# Patient Record
Sex: FEMALE | Race: WHITE | NOT HISPANIC OR LATINO | Employment: FULL TIME | URBAN - METROPOLITAN AREA
[De-identification: names, ages, dates, MRNs, and addresses within clinical notes are randomized per-mention and may not be internally consistent; named-entity substitution may affect disease eponyms.]

---

## 2019-08-21 ENCOUNTER — TELEPHONE (OUTPATIENT)
Dept: ORTHOPEDICS | Facility: CLINIC | Age: 64
End: 2019-08-21

## 2019-08-21 NOTE — TELEPHONE ENCOUNTER
Pt called direct number, pt is wanting update. Informed her we received her images yesterday PM, will send send to uploaded today. will review records that we have received and call pt to update later today or Tomorrow. Pt pleased and verbalized understanding.

## 2019-08-22 ENCOUNTER — TELEPHONE (OUTPATIENT)
Dept: ORTHOPEDICS | Facility: CLINIC | Age: 64
End: 2019-08-22

## 2019-08-22 NOTE — TELEPHONE ENCOUNTER
Spoke to pt, informed her that we reviewed her records, we need PCP records, labs, meds, h&P. AN Requesting today. Pt pleased and verbalized understanding.

## 2019-08-27 ENCOUNTER — TELEPHONE (OUTPATIENT)
Dept: ORTHOPEDICS | Facility: CLINIC | Age: 64
End: 2019-08-27

## 2019-08-27 NOTE — TELEPHONE ENCOUNTER
Phoned Ms. Eason to inform her of records needed to proceed and the time line of the process.      - She informed that she is not taking any medications regularly, only Aleve as needed for knee pain,     - She stated that she recently had labs drawn, writer will request from Dr. Cortes. (phone [phone])     - Ms. Eason stated that her PCP will follow her post op.     - Writer asked about the Hematology eval mentioned in the Rheumatology notes, she stated that Dr. Cortes thought she may have Lupus but when the blood work returned Lupus was ruled out.    Writer informed of items not covered in the [employer]:     - DME - patient stated that she has a walker and will look for a BSC    - Post op medications - she stated that she does not have a co pay with [employer] insurance - writer encouraged her to call her insurance plan as the [facility] pharmacy may be out of network and a co pay may be required.    Patient verbalized understanding.

## 2019-08-29 ENCOUNTER — TELEPHONE (OUTPATIENT)
Dept: ORTHOPEDICS | Facility: CLINIC | Age: 64
End: 2019-08-29

## 2019-08-29 NOTE — TELEPHONE ENCOUNTER
L/M via voicemail to inform patient that labs were received an a PT is still needed.  Patient stated in previous conversation that she takes no medication, however, we will need physician documentation.  A fax has been forwarded to Dr. Cortes, PCP.

## 2019-08-29 NOTE — TELEPHONE ENCOUNTER
Phoned Dr. Cortes' office to confirm fax receipt.  The  stated that she had not received the fax.  Writer stated it was just faxed and confirmation received, will check back another time.

## 2019-08-30 ENCOUNTER — TELEPHONE (OUTPATIENT)
Dept: ORTHOPEDICS | Facility: CLINIC | Age: 64
End: 2019-08-30

## 2019-08-30 NOTE — TELEPHONE ENCOUNTER
Pt called direct line, informed pt she will need a PT lab drawn. Order has been faxed to her PCP. Pt states she will call today and get the lab done this afternoon. Pt pleased and verbalized understanding.

## 2019-09-04 ENCOUNTER — TELEPHONE (OUTPATIENT)
Dept: ORTHOPEDICS | Facility: CLINIC | Age: 64
End: 2019-09-04

## 2019-09-04 NOTE — TELEPHONE ENCOUNTER
"Phoned patient to inquire about physician concerns, she reported:    DVT 10 years ago: was at work doing inventory all day, at time to leave, her son came to pick her up from work and she could not get in the truck, her son had to pick her up and put her in the truck, she then went straight to the ER.  No cause was ever disclosed to her by a physician.    UA: She was on medication for bronchitis and it was thought that the antibiotics caused the UTI.  She was given "a pill" to clear it up.    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    Informed patient that these responses will be submitted to the physician and a call will be placed to her with the outcome. Patient verbalized understanding.    "

## 2019-09-05 ENCOUNTER — TELEPHONE (OUTPATIENT)
Dept: ORTHOPEDICS | Facility: CLINIC | Age: 64
End: 2019-09-05

## 2019-09-05 NOTE — TELEPHONE ENCOUNTER
Patient phoned to check the status of her case.  Writer informed that she had been approved, however, a surgery date could not be confirmed until Dr. Jones has been consulted (he is in surgery today).  The possible dates given were 10/15 or 10/22.  Writer informed that once the date is confirmed, she will be notified.

## 2019-09-06 ENCOUNTER — TELEPHONE (OUTPATIENT)
Dept: ORTHOPEDICS | Facility: CLINIC | Age: 64
End: 2019-09-06

## 2019-09-06 NOTE — TELEPHONE ENCOUNTER
Spoke to patient, informed her of her 10/22/19 surgery date, explained POC submittal to [facility], set up Telemed visit for 10/8/2019 @ 1030.  Provided 9 day schedule.  Patient verbalized understanding.

## 2019-09-09 ENCOUNTER — TELEPHONE (OUTPATIENT)
Dept: ORTHOPEDICS | Facility: CLINIC | Age: 64
End: 2019-09-09

## 2019-09-09 NOTE — TELEPHONE ENCOUNTER
"----- Message from Emily Miller RN sent at 9/6/2019  4:23 PM CDT -----  Regarding: RE: [employer] [facility] RIGHT KNEE  Appts scheduled.    10/8 at 0800 reserved for phone call with Dr. Bustillo, please confirm.     Emily     ----- Message -----  From: Mouna Hylton RN  Sent: 9/6/2019   4:09 PM  To: Fátima Bustillo MD, #  Subject: FW: [employer] [facility] RIGHT KNEE                       Hi Eleisha    Ms. Eason has been scheduled for surgery on 10-22-19.  Her pre-op appointment is scheduled on 10-21-19.  Please schedule a call with Dr. Bustillo for this patient.  Thanks.    Mouna  ----- Message -----  From: Fátima Bustillo MD  Sent: 9/4/2019   6:51 PM  To: Srikanth Jones MD, Emily Miller RN, #  Subject: RE: [employer] [facility] RIGHT KNEE                       Tx   Good to proceed   ----- Message -----  From: Mouna Hylton RN  Sent: 9/4/2019  11:34 AM  To: Srikanth Jones MD, #  Subject: RE: [employer] [facility] RIGHT KNEE                       Good morning Dr. 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  No cause was ever disclosed to her by a physician.    UA: She was on an antibiotic medication for bronchitis and it was thought that the antibiotics caused the UTI.  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    Please let me know if there is anything else that you need.  Thanks.    Mouna  ----- Message -----  From: Fátima Bustillo MD  Sent: 9/3/2019  11:18 PM  To: Srikanth Jones MD, Eleisha S Brown, RN, #  Subject: RE: [employer] [facility] RIGHT [facility]                       Office visit from March 2019 - indicates DVT 10 years ago , treated with Anticoagulation  -was there a reason for it - " post surgery, long travel , prolonged hospital stay etc    UA - 5/2/2019 - UTI-was this addressed ?    Note from Jan 2019 - indicates - left eye upward deviation - is this resolved ? What was felt to be the cause ? Is this long standing ?  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     I spoke with the patient re:     - She informed that she is not taking any medications regularly, only Aleve as needed for knee pain,      - When asked about the Hematology eval mentioned in the Rheumatology notes, she stated that Dr. Cortes thought she may have Lupus but when the blood work returned Lupus was ruled out.    Thanks,     Mouna

## 2019-09-09 NOTE — TELEPHONE ENCOUNTER
Patient returned writer's call.  She confirmed the date and time of 10-8-19 @ 0800 to speak with Dr. Bustillo.     Writer informed her that she will need to provide a therapy facility for post op PT and that she will need to go through her insurance company for outpatient PT, she asked about where to send the disability papers, fax & phone number given, she had questions about a second knee surgery, HDP's number given as well.  Patient verbalized understanding.

## 2019-09-20 ENCOUNTER — TELEPHONE (OUTPATIENT)
Dept: ORTHOPEDICS | Facility: CLINIC | Age: 64
End: 2019-09-20

## 2019-09-20 DIAGNOSIS — M17.11 PRIMARY OSTEOARTHRITIS OF RIGHT KNEE: Primary | ICD-10-CM

## 2019-09-20 NOTE — TELEPHONE ENCOUNTER
Left detailed message regarding f/u care after surgery. Provided name and direct line. Awaiting call back.

## 2019-09-26 ENCOUNTER — TELEPHONE (OUTPATIENT)
Dept: ORTHOPEDICS | Facility: CLINIC | Age: 64
End: 2019-09-26

## 2019-09-26 NOTE — TELEPHONE ENCOUNTER
Dr. Manny Cortes left  stating he will provide the post-operative care for this pt. Provided number for questions.

## 2019-10-08 ENCOUNTER — TELEPHONE (OUTPATIENT)
Dept: INTERNAL MEDICINE | Facility: CLINIC | Age: 64
End: 2019-10-08

## 2019-10-08 ENCOUNTER — TELEPHONE (OUTPATIENT)
Dept: ORTHOPEDICS | Facility: CLINIC | Age: 64
End: 2019-10-08

## 2019-10-08 DIAGNOSIS — R60.9 EDEMA, UNSPECIFIED TYPE: ICD-10-CM

## 2019-10-08 DIAGNOSIS — D64.9 ANEMIA, UNSPECIFIED TYPE: ICD-10-CM

## 2019-10-08 DIAGNOSIS — H81.09 MENIERE'S DISEASE, UNSPECIFIED LATERALITY: ICD-10-CM

## 2019-10-08 DIAGNOSIS — Z86.718 HISTORY OF THROMBOSIS: ICD-10-CM

## 2019-10-08 DIAGNOSIS — H54.7 BLINDNESS: ICD-10-CM

## 2019-10-08 RX ORDER — TRIAMTERENE AND HYDROCHLOROTHIAZIDE 37.5; 25 MG/1; MG/1
1 CAPSULE ORAL DAILY
Refills: 3 | COMMUNITY
Start: 2019-08-30

## 2019-10-08 RX ORDER — CHOLECALCIFEROL (VITAMIN D3) 125 MCG
440 CAPSULE ORAL
Status: ON HOLD | COMMUNITY
End: 2019-10-22 | Stop reason: HOSPADM

## 2019-10-08 NOTE — TELEPHONE ENCOUNTER
Pt called regarding U/S. Informed her that she does not need another U/S, Dr. Bustillo is requesting the results, pt states she will have her PCP fax the U/S results with the blood work. Understanding verbalized.

## 2019-10-08 NOTE — TELEPHONE ENCOUNTER
Spoke to patient regarding findings from physician review.  Informed her that the orders for blood work would be forwarded to Dr. Cortes (PCP), as well the request for the U/S of her leg back in March 2019.  She stated her cardiology records should be obtained from [facility] - this was an ER visit for chest pain.

## 2019-10-08 NOTE — TELEPHONE ENCOUNTER
Pre op  Phone call     10/22/2019 - Rt knee replacement     Chief complaint-Preoperative evaluation , Perioperative Medical management, complication reduction plan     Date of Evaluation- 10/08/2019      History of present illness- I had the pleasure of evaluating  this pleasant 64 y.o. lady in the pre op clinic prior to her elective Orthopedic surgery. The patient is new to me .    I have obtained the history by speaking to the patient and by reviewing the electronic health records.    Events leading up to surgery / History of presenting illness -    She has been troubled with moderate-severe  Rt knee  pain for the past 8-9 months.. Has less intense pain for 5-6 years prior   . Pain increases with standing for a long time  and activity and decreases with resting.      Relevant health conditions of significance for the perioperative period/ History of presenting illness -    Meniere's disease     Not known to have heart disease , Diabetes Mellitus, HTN, Lung disease -    2008/2009 - Had gall bladder removal - symptoms leading to that - Chest tightness   Had Stress test, EKG  No chest discomfort since     Active cardiac conditions- none    Revised cardiac risk index predictors- None     Works at [employer]   On her feet 8 hours a day   Was a    Currently not working     Lives alone - most of the times     Functional capacity -Examples of physical activity, Grocery,    house work and  can take a flight of stairs holding on to the railing----- She can undertake all the above activities without  chest pain,chest tightness, Shortness of breath ,dizziness,lightheadedness making her exercise tolerance more, than 4 Mets.       Review of symptoms    @ROS@    Constitutional - No significant weight changes ,No fever  Eyes- No new vision changes  ENT- STOP BANG -Used to snore in the past , age over 48  There is no height or weight on file to calculate BMI.  Cardiac-As above   Respiratory- No cough, expectoration and  no  hemoptysis  GI- Bowel movements  regular- every other day   No overt GI/ blood losses   -No dysuria and  no urinary hesitancy   MS-As above/  Has contralateral knee pain    Neurologic-No unilateral weakness   Psychiatric- no suicidal, homicidal ideation   Endocrine-  Prednisone use for over 3 weeks -no  Hematological/Lymphatic-No spontaneous bruising, bleeding    Past Medical history-     Pertinent negatives-     Heart disease -  Vascular stenting -    Past Surgical history -       No Anaesthetic,Bleeding ,Cardiac problems with previous surgeries  No history of delirium   No history of post operative  nausea, vomiting     Family history-    FH- No anesthesia,bleeding in family   Father had blood clot after CABG   Heart disease- 70's    Social history-     Lives alone  Help available post op - [place]    Medications and Allergies - reviewed in EPIC      Investigations    Review of Medicine tests    May 2019 - Anemia  Likely Aleve - related - lately less usage     8/30/2019 - INR -0.9    Will request office to provide Medication instructions     Dr NIRMAL Bustillo MD MRCP ( ),FACP   [facility]  [facility]   Pager [phone], Cell [phone]

## 2019-10-08 NOTE — TELEPHONE ENCOUNTER
Provided joint education. Pt provided OP PT location, answered questions and concerns. Understanding verbalized.

## 2019-10-08 NOTE — ASSESSMENT & PLAN NOTE
2009   Had Lap Cholecystectomy   A few months after the Cholecystectomy   Rt LE   Hospitalized over night   History suggests possible ? Lovenox   Was not on Anticoagulation to go home   Reports being ambulatory after Cholecystectomy   History of ?DVT  No PE  Episode- 1  Associated with no reduced mobility, no long journeys, no cancer, prior surgery, no Hospital stay, no HRT  Was working a lot at that time   IVC filter - None

## 2019-10-10 DIAGNOSIS — M17.11 PRIMARY OSTEOARTHRITIS OF RIGHT KNEE: ICD-10-CM

## 2019-10-10 DIAGNOSIS — Z96.651 STATUS POST TOTAL RIGHT KNEE REPLACEMENT: Primary | ICD-10-CM

## 2019-10-15 ENCOUNTER — TELEPHONE (OUTPATIENT)
Dept: ORTHOPEDICS | Facility: CLINIC | Age: 64
End: 2019-10-15

## 2019-10-15 NOTE — TELEPHONE ENCOUNTER
Returned pt's call, confirmed her 0800 am appt start time for Monday 10/21. Informed her that Nydia or Carolee will call to discuss where to meet and she will be escorted to her appointments. Pt pleased and verbalized understanding.

## 2019-10-21 ENCOUNTER — HOSPITAL ENCOUNTER (OUTPATIENT)
Dept: RADIOLOGY | Facility: HOSPITAL | Age: 64
Discharge: HOME | End: 2019-10-21
Attending: ORTHOPAEDIC SURGERY
Payer: COMMERCIAL

## 2019-10-21 ENCOUNTER — INITIAL CONSULT (OUTPATIENT)
Dept: INTERNAL MEDICINE | Facility: CLINIC | Age: 64
End: 2019-10-21
Payer: MEDICARE

## 2019-10-21 ENCOUNTER — OFFICE VISIT (OUTPATIENT)
Dept: ORTHOPEDICS | Facility: CLINIC | Age: 64
End: 2019-10-21
Payer: MEDICARE

## 2019-10-21 ENCOUNTER — ANESTHESIA EVENT (OUTPATIENT)
Dept: SURGERY | Facility: HOSPITAL | Age: 64
DRG: 470 | End: 2019-10-21
Payer: COMMERCIAL

## 2019-10-21 ENCOUNTER — HOSPITAL ENCOUNTER (OUTPATIENT)
Dept: PREADMISSION TESTING | Facility: HOSPITAL | Age: 64
Discharge: HOME | End: 2019-10-21
Attending: ANESTHESIOLOGY
Payer: COMMERCIAL

## 2019-10-21 ENCOUNTER — OFFICE VISIT (OUTPATIENT)
Dept: ORTHOPEDICS | Facility: CLINIC | Age: 64
End: 2019-10-21
Payer: COMMERCIAL

## 2019-10-21 VITALS
WEIGHT: 189.63 LBS | SYSTOLIC BLOOD PRESSURE: 115 MMHG | BODY MASS INDEX: 30.47 KG/M2 | HEART RATE: 80 BPM | TEMPERATURE: 97 F | HEIGHT: 66 IN | DIASTOLIC BLOOD PRESSURE: 77 MMHG

## 2019-10-21 VITALS — WEIGHT: 189.63 LBS | BODY MASS INDEX: 30.47 KG/M2 | HEIGHT: 66 IN

## 2019-10-21 VITALS
SYSTOLIC BLOOD PRESSURE: 104 MMHG | TEMPERATURE: 97 F | BODY MASS INDEX: 30.47 KG/M2 | HEART RATE: 82 BPM | HEIGHT: 66 IN | WEIGHT: 189.63 LBS | OXYGEN SATURATION: 96 % | DIASTOLIC BLOOD PRESSURE: 66 MMHG

## 2019-10-21 DIAGNOSIS — M17.11 PRIMARY OSTEOARTHRITIS OF RIGHT KNEE: Primary | ICD-10-CM

## 2019-10-21 DIAGNOSIS — Z86.718 HISTORY OF THROMBOSIS: ICD-10-CM

## 2019-10-21 DIAGNOSIS — D75.89 MACROCYTOSIS: ICD-10-CM

## 2019-10-21 DIAGNOSIS — Z96.651 STATUS POST TOTAL RIGHT KNEE REPLACEMENT: ICD-10-CM

## 2019-10-21 DIAGNOSIS — H81.02 MENIERE'S DISEASE OF LEFT EAR: ICD-10-CM

## 2019-10-21 DIAGNOSIS — Z87.891 HISTORY OF TOBACCO USE: ICD-10-CM

## 2019-10-21 DIAGNOSIS — D64.9 ANEMIA, UNSPECIFIED TYPE: ICD-10-CM

## 2019-10-21 DIAGNOSIS — M17.11 PRIMARY OSTEOARTHRITIS OF RIGHT KNEE: ICD-10-CM

## 2019-10-21 DIAGNOSIS — R60.9 EDEMA, UNSPECIFIED TYPE: ICD-10-CM

## 2019-10-21 DIAGNOSIS — K21.9 GASTROESOPHAGEAL REFLUX DISEASE, ESOPHAGITIS PRESENCE NOT SPECIFIED: ICD-10-CM

## 2019-10-21 DIAGNOSIS — Z01.818 PREOP EXAMINATION: Primary | ICD-10-CM

## 2019-10-21 PROBLEM — H54.40 BLINDNESS OF LEFT EYE: Status: ACTIVE | Noted: 2019-10-08

## 2019-10-21 PROCEDURE — 99999 PR PBB SHADOW E&M-EST. PATIENT-LVL II: ICD-10-PCS | Mod: PBBFAC,COE,, | Performed by: ORTHOPAEDIC SURGERY

## 2019-10-21 PROCEDURE — 99999 PR PBB SHADOW E&M-EST. PATIENT-LVL I: ICD-10-PCS | Mod: PBBFAC,COE,, | Performed by: HOSPITALIST

## 2019-10-21 PROCEDURE — 99499 NO LOS: ICD-10-PCS | Mod: S$GLB,COE,, | Performed by: PHYSICIAN ASSISTANT

## 2019-10-21 PROCEDURE — 99499 UNLISTED E&M SERVICE: CPT | Mod: S$GLB,COE,, | Performed by: ORTHOPAEDIC SURGERY

## 2019-10-21 PROCEDURE — 99211 OFF/OP EST MAY X REQ PHY/QHP: CPT | Mod: PBBFAC,25 | Performed by: HOSPITALIST

## 2019-10-21 PROCEDURE — 99999 PR PBB SHADOW E&M-EST. PATIENT-LVL IV: CPT | Mod: PBBFAC,COE,, | Performed by: PHYSICIAN ASSISTANT

## 2019-10-21 PROCEDURE — 99499 NO LOS: ICD-10-PCS | Mod: S$GLB,COE,, | Performed by: ORTHOPAEDIC SURGERY

## 2019-10-21 PROCEDURE — 99214 OFFICE O/P EST MOD 30 MIN: CPT | Mod: PBBFAC,25,27 | Performed by: PHYSICIAN ASSISTANT

## 2019-10-21 PROCEDURE — 99999 PR PBB SHADOW E&M-EST. PATIENT-LVL I: CPT | Mod: PBBFAC,COE,, | Performed by: HOSPITALIST

## 2019-10-21 PROCEDURE — 99999 PR PBB SHADOW E&M-EST. PATIENT-LVL IV: ICD-10-PCS | Mod: PBBFAC,COE,, | Performed by: PHYSICIAN ASSISTANT

## 2019-10-21 PROCEDURE — 99204 PR OFFICE/OUTPT VISIT, NEW, LEVL IV, 45-59 MIN: ICD-10-PCS | Mod: S$PBB,COE,, | Performed by: HOSPITALIST

## 2019-10-21 PROCEDURE — 73560 X-RAY EXAM OF KNEE 1 OR 2: CPT | Mod: 26,RT,COE, | Performed by: RADIOLOGY

## 2019-10-21 PROCEDURE — 73560 XR KNEE 1 OR 2 VIEW RIGHT: ICD-10-PCS | Mod: 26,RT,COE, | Performed by: RADIOLOGY

## 2019-10-21 PROCEDURE — 99499 UNLISTED E&M SERVICE: CPT | Mod: S$GLB,COE,, | Performed by: PHYSICIAN ASSISTANT

## 2019-10-21 PROCEDURE — 99999 PR PBB SHADOW E&M-EST. PATIENT-LVL II: CPT | Mod: PBBFAC,COE,, | Performed by: ORTHOPAEDIC SURGERY

## 2019-10-21 PROCEDURE — 99212 OFFICE O/P EST SF 10 MIN: CPT | Mod: PBBFAC,25,27 | Performed by: ORTHOPAEDIC SURGERY

## 2019-10-21 PROCEDURE — 99204 OFFICE O/P NEW MOD 45 MIN: CPT | Mod: S$PBB,COE,, | Performed by: HOSPITALIST

## 2019-10-21 PROCEDURE — 73560 X-RAY EXAM OF KNEE 1 OR 2: CPT | Mod: TC,RT

## 2019-10-21 RX ORDER — OXYCODONE HYDROCHLORIDE 5 MG/1
10 TABLET ORAL
Status: CANCELLED | OUTPATIENT
Start: 2019-10-21

## 2019-10-21 RX ORDER — BISACODYL 10 MG
10 SUPPOSITORY, RECTAL RECTAL EVERY 12 HOURS PRN
Status: CANCELLED | OUTPATIENT
Start: 2019-10-21

## 2019-10-21 RX ORDER — ROPIVACAINE HYDROCHLORIDE 2 MG/ML
8 INJECTION, SOLUTION EPIDURAL; INFILTRATION; PERINEURAL CONTINUOUS
Status: CANCELLED | OUTPATIENT
Start: 2019-10-21

## 2019-10-21 RX ORDER — MORPHINE SULFATE 10 MG/ML
2 INJECTION, SOLUTION INTRAMUSCULAR; INTRAVENOUS
Status: CANCELLED | OUTPATIENT
Start: 2019-10-21

## 2019-10-21 RX ORDER — NALOXONE HCL 0.4 MG/ML
0.02 VIAL (ML) INJECTION
Status: CANCELLED | OUTPATIENT
Start: 2019-10-21

## 2019-10-21 RX ORDER — AMOXICILLIN 250 MG
1 CAPSULE ORAL 2 TIMES DAILY
Status: CANCELLED | OUTPATIENT
Start: 2019-10-21

## 2019-10-21 RX ORDER — DOCUSATE SODIUM 100 MG/1
100 CAPSULE, LIQUID FILLED ORAL 2 TIMES DAILY PRN
Qty: 60 CAPSULE | Refills: 0 | Status: SHIPPED | OUTPATIENT
Start: 2019-10-21 | End: 2020-01-13 | Stop reason: ALTCHOICE

## 2019-10-21 RX ORDER — ACETAMINOPHEN 500 MG
1000 TABLET ORAL EVERY 6 HOURS
Status: CANCELLED | OUTPATIENT
Start: 2019-10-22 | End: 2019-10-23

## 2019-10-21 RX ORDER — OXYCODONE HYDROCHLORIDE 5 MG/1
TABLET ORAL
Qty: 50 TABLET | Refills: 0 | Status: SHIPPED | OUTPATIENT
Start: 2019-10-21 | End: 2019-10-28 | Stop reason: SDUPTHER

## 2019-10-21 RX ORDER — FENTANYL CITRATE 50 UG/ML
25 INJECTION, SOLUTION INTRAMUSCULAR; INTRAVENOUS EVERY 5 MIN PRN
Status: CANCELLED | OUTPATIENT
Start: 2019-10-21

## 2019-10-21 RX ORDER — CELECOXIB 100 MG/1
200 CAPSULE ORAL DAILY
Status: CANCELLED | OUTPATIENT
Start: 2019-10-22

## 2019-10-21 RX ORDER — OXYCODONE HYDROCHLORIDE 5 MG/1
15 TABLET ORAL
Status: CANCELLED | OUTPATIENT
Start: 2019-10-21

## 2019-10-21 RX ORDER — MUPIROCIN 20 MG/G
1 OINTMENT TOPICAL
Status: CANCELLED | OUTPATIENT
Start: 2019-10-21

## 2019-10-21 RX ORDER — FAMOTIDINE 20 MG/1
20 TABLET, FILM COATED ORAL 2 TIMES DAILY
Status: CANCELLED | OUTPATIENT
Start: 2019-10-21

## 2019-10-21 RX ORDER — ASPIRIN 81 MG/1
81 TABLET ORAL 2 TIMES DAILY
Status: CANCELLED | OUTPATIENT
Start: 2019-10-21

## 2019-10-21 RX ORDER — RAMELTEON 8 MG/1
8 TABLET ORAL NIGHTLY PRN
Status: CANCELLED | OUTPATIENT
Start: 2019-10-21

## 2019-10-21 RX ORDER — DEXTROMETHORPHAN HYDROBROMIDE, GUAIFENESIN 5; 100 MG/5ML; MG/5ML
650 LIQUID ORAL EVERY 8 HOURS PRN
Qty: 120 TABLET | Refills: 0 | Status: ON HOLD | OUTPATIENT
Start: 2019-10-21 | End: 2020-07-07

## 2019-10-21 RX ORDER — SODIUM CHLORIDE 9 MG/ML
INJECTION, SOLUTION INTRAVENOUS
Status: CANCELLED | OUTPATIENT
Start: 2019-10-21

## 2019-10-21 RX ORDER — PREGABALIN 25 MG/1
150 CAPSULE ORAL
Status: CANCELLED | OUTPATIENT
Start: 2019-10-21

## 2019-10-21 RX ORDER — ACETAMINOPHEN 10 MG/ML
1000 INJECTION, SOLUTION INTRAVENOUS ONCE
Status: CANCELLED | OUTPATIENT
Start: 2019-10-21 | End: 2019-10-21

## 2019-10-21 RX ORDER — POLYETHYLENE GLYCOL 3350 17 G/17G
17 POWDER, FOR SOLUTION ORAL DAILY
Status: CANCELLED | OUTPATIENT
Start: 2019-10-22

## 2019-10-21 RX ORDER — SODIUM CHLORIDE 0.9 % (FLUSH) 0.9 %
10 SYRINGE (ML) INJECTION
Status: CANCELLED | OUTPATIENT
Start: 2019-10-21

## 2019-10-21 RX ORDER — OXYCODONE HYDROCHLORIDE 5 MG/1
5 TABLET ORAL
Status: CANCELLED | OUTPATIENT
Start: 2019-10-21

## 2019-10-21 RX ORDER — SODIUM CHLORIDE 9 MG/ML
INJECTION, SOLUTION INTRAVENOUS CONTINUOUS
Status: CANCELLED | OUTPATIENT
Start: 2019-10-21 | End: 2019-10-22

## 2019-10-21 RX ORDER — MUPIROCIN 20 MG/G
1 OINTMENT TOPICAL 2 TIMES DAILY
Status: CANCELLED | OUTPATIENT
Start: 2019-10-21 | End: 2019-10-26

## 2019-10-21 RX ORDER — PREGABALIN 25 MG/1
150 CAPSULE ORAL NIGHTLY
Status: CANCELLED | OUTPATIENT
Start: 2019-10-21

## 2019-10-21 RX ORDER — ONDANSETRON 2 MG/ML
4 INJECTION INTRAMUSCULAR; INTRAVENOUS EVERY 8 HOURS PRN
Status: CANCELLED | OUTPATIENT
Start: 2019-10-21

## 2019-10-21 RX ORDER — MIDAZOLAM HYDROCHLORIDE 1 MG/ML
1 INJECTION INTRAMUSCULAR; INTRAVENOUS EVERY 5 MIN PRN
Status: CANCELLED | OUTPATIENT
Start: 2019-10-21

## 2019-10-21 RX ORDER — LIDOCAINE HYDROCHLORIDE 10 MG/ML
1 INJECTION, SOLUTION EPIDURAL; INFILTRATION; INTRACAUDAL; PERINEURAL
Status: CANCELLED | OUTPATIENT
Start: 2019-10-21

## 2019-10-21 RX ORDER — CELECOXIB 100 MG/1
400 CAPSULE ORAL
Status: CANCELLED | OUTPATIENT
Start: 2019-10-21

## 2019-10-21 NOTE — ASSESSMENT & PLAN NOTE
Quit in 2007   Smoked for 15 years , 1-2 packs per day     Not known to have COPD,  Emphysema, wheezing , chronic phlegm   No inhaler, oxygen use   subjectively , no SOB

## 2019-10-21 NOTE — PROGRESS NOTES
[facility] - Pre Op Consult  Progress Note    Patient Name: Mirna Eason  MRN: [id number]  Date of Evaluation- 10/21/2019  PCP- Primary Doctor No    Future cases for Mirna Eason [[id number]]     Case ID Status Date Time Myron Procedure Provider Location    [id number] [facility] 10/22/2019  9:30  ARTHROPLASTY, KNEE, TOTAL-GELACIO Jones MD [[id number]] [facility] OR      Rt     HPI:  History of present illness- I had the pleasure of meeting this pleasant 64 y.o. lady in the pre op clinic prior to her elective Orthopedic surgery. The patient is new to me . Mirna was accompanied by sister Rashmi .    I have obtained the history by speaking to the patient and by reviewing the electronic health records.    Events leading up to surgery / History of presenting illness -    She has been troubled with moderate-severe  Right knee  pain for 1 year  . Pain increases with activity and decreases with resting.    Relevant health conditions of significance for the perioperative period/ History of presenting illness -    Subjectively describes health as good      Not known to have heart disease , Diabetes Mellitus, HTN, Lung disease -    In 2009   Had Rt lateral Thigh area discomfort - had it one day   Leading to ER visit   As per her ER MD felt that she had thrombosis , based on US   Hospitalized over night   History suggests possible ? Lovenox treatment   Was discharged home the next day  Was not on discharged on  Anticoagulation   She believes that she does not have thrombosis   She believes that it is muscle related as she was up and down the ladder that day   Still has some discomfort on walking   It is unclear that it she had thrombosis  The hospital where she was cared for is no longer open   As per her she had US March 1 st 2019 of the lower extremities and to her understanding no Thrombosis    History of ?DVT  No PE  Episode- 1  Associated with no reduced mobility, no long journeys, no cancer,no  prior surgery, no Hospital stay,  no HRT,no tobacco use   Was working a lot at that time   IVC filter - None        2011 - Had gall bladder removal - symptoms leading to that - Chest tightness   Went to ER  Had Stress test, EKG - To her understanding - no problem with heart   Further testing showed reduced gall bladder function leading to gall bladder rwemoval  No chest discomfort since   Chest discomfort was attributed to gall bladder problem      Works at [employer]   On her feet 8 hours a day   Was a    Currently not working      Lives alone - most of the times  Sister going to help post op           Subjective/ Objective:       Chief complaint-Preoperative evaluation, Perioperative Medical management, complication reduction plan     Active cardiac conditions- none    Revised cardiac risk index predictors- none    Functional capacity -Examples of physical activity, paces her self,   house work and can take a flight of stairs holding on to the railing, grocery , goes to [employer] ----- She can undertake all the above activities without  chest pain,chest tightness, Shortness of breath ,dizziness,lightheadedness making her exercise tolerance more,  than 4 Mets.       Review of Systems   Constitutional: Negative for chills and fever.        No unusual weight changes     HENT:        GENOG score  1/ 8        Age over 50        Eyes:        No new visual changes   Respiratory:        No cough , phlegm  No Hemoptysis   Cardiovascular:        As noted   Gastrointestinal:        No overt GI/ blood losses  Bowel movements- - Every other day - usual pattern  Suggested follow up    Endocrine:        Prednisone use > 20 mg daily for 3 weeks- none    Genitourinary: Negative for dysuria.        No urinary hesitancy    Musculoskeletal:        Bilateral knee pains   Skin: Negative for rash.   Neurological: Negative for syncope.        No unilateral weakness   Hematological:        Current use of Anticoagulants  None    Psychiatric/Behavioral:        No  Depression,Anxiety     No vascular stenting     No past medical history pertinent negatives.  No family history on file.      No anesthesia, bleeding, cardiac problems, PONV with previous surgeries/procedures.  Medications and Allergies reviewed in epic.   FH- No anesthesia,bleeding / venous thrombosis  in family     Physical Exam      Physical Exam  Constitutional- General appearance-Conscious,Coherent  Eyes- No conjunctival icterus,pupils Left eye Medial deviation ,  round  and reactive to light   ENT-Oral cavity- moist  , Hearing grossly normal   Neck- No thyromegaly ,Trachea -central, No jugular venous distension,   No Carotid Bruit   Cardiovascular -Heart Sounds- Normal  and  no murmur   , No gallop rhythm   Respiratory - Normal Respiratory Effort, Normal breath sounds,  no wheeze  and  no forced expiratory wheeze    Peripheral pitting pedal edema-- mild, no calf pain   Gastrointestinal -Soft abdomen, No palpable masses, Non Tender,Liver,Spleen not palpable. No-- free fluid and shifting dullness  Musculoskeletal- No finger Clubbing. Strength grossly normal   Lymphatic-No Palpable cervical, axillary,Inguinal lymphadenopathy   Psychiatric - normal effect,Orientation  Rt Dorsalis pedis pulses-palpable    Lt Dorsalis pedis pulses- palpable   Rt Posterior tibial pulses -palpable   Left posterior tibial pulses -palpable   Miscellaneous -  no asterixis,  no dupuytren's contracture,  Surgical scarlower abdomen ,left ankle , rash Under abdomen - uses Mono stat as needed - rash not bothering her- she deffed  any treratment for now ,  no renal bruit,  bowel sounds positive  and osteoarthritic nodes   Investigations  Lab and Imaging have been reviewed in epic.    Creatinine May 2019 - 0.83     Aug 2019 - INR 0.9    [facility]   Cassidy scan Thallium testing- 2010 - No ischemia, LVEf 61 %    EKG July 2010- Sinus  Stress test - April 2012- no ischemia   LVEF - 55%  EKG 2016- No acute changes       Review  of old records- Was done and information gathered regards to events leading to surgery and health conditions of significance in the perioperative period.        Preoperative cardiac risk assessment-  The patient does not have any active cardiac conditions . Revised cardiac risk index predictors-0 ---.Functional capacity is more than 4 Mets. She will be undergoing a Orthopedic procedure that carries a intermediate risk     Risk of a major Cardiac event ( Defined as death, myocardial infarction, or cardiac arrest at 30 days after noncardiac surgery), based on RCRI score     -3.9%       No further cardiac work up is indicated prior to proceeding with the surgery          American Society of Anesthesiologists Physical status classification ( ASA ) class: 3     Postoperative pulmonary complication risk assessment:      ARISCAT ( Canet) risk index- risk class -  Low, if duration of surgery is under 3 hours, intermediate, if duration of surgery is over 3 hours        Assessment/Plan:     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   Inward,deviation Left eye   No recent problem   Functional  Care suggested with Rt eye since it is the only functioning  eye      Meniere disease  Left side   Takes Triamterene - HCTZ - daily for Meniere's which is also helping with lower extremity edema   No recent problem   Has been holding this Medication- for the past few days - ( Mis understanding ?) - mild ear fullness- since holding  Suggested resumption, suggested holding AM of surgery         Anemia  May 2019 - Anemia  Likely Aleve - related -took for 5 years    lately less usage   Last colonoscopy 10 years ago   No ulcer history   May 2019 - Hb 11.3   HCT - 35   Likely cause of Anemia is NSAID use     No overt GI/ bleeding     She feels that's he can handle ASA     Edema  On Dyazide for edema  Not known to have HTN  /80's     Edema- I suggested avoidance of added salt,avoidance of NSAID's, unless advised or ordered  and  suggested Limb elevation and aundrea hose use    Acid reflux  GERD  Symptoms -acid taste to the throat   With certain foods  Takes Tums as needed - once in a few months    History of tobacco use  Quit in 2007   Smoked for 15 years , 1-2 packs per day     Not known to have COPD,  Emphysema, wheezing , chronic phlegm   No inhaler, oxygen use   subjectively , no SOB        Preventive perioperative care    Thromboembolic prophylaxis:  Her risk factors for thrombosis include surgical procedure and age.I suggest  thromboembolic prophylaxis ( mechanical/pharmacological, weighing the risk benefits of pharmacological agent use considering nhung procedural bleeding )  during the perioperative period.I suggested being active in the post operative period.   The patient is a candidate for extended DVT prophylaxis     Postoperative pulmonary complication prophylaxis-Risk factors for post operative pulmonary complications include age over 65 years and ASA class >2- I suggest incentive spirometry use, early ambulation and end tidal carbon dioxide monitoring  , oral care , head end of bed elevation      Renal complication prophylaxis-. I suggest keeping her well hydrated  in the perioperative period.  Suggested hydration      Surgical site Infection Prophylaxis-I  suggest appropriate antibiotic for Prophylaxis against Surgical site infections      In view of regional anesthesia  the patient  is at risk of postoperative urinary retention.  I suggest avoidance / minimizing the of  Benzodiazepines,Anticholinergic medication,antihistamines ( Benadryl) , if possible in the perioperative period. I suggest using the minimum possible use of opioids for the minimum period of time in the perioperative period. Benadryl avoidance suggested      This visit was focused on Preoperative evaluation, Perioperative Medical management, complication reduction plans. I suggest that the patient follows up with primary care or relevant sub specialists for  "ongoing health care.    I appreciate the opportunity to be involved in this patients care. Please feel free to contact me if there were any questions about this consultation.    Patient is optimized     Patient was instructed to call and update me about any changes to health,  medication, office visits ,testing out side of the [facility] operative care center , hospitalizations between now and surgery     Fátima Bustillo MD  Perioperative Medicine  [facility]   Pager [phone]  --  10/21- 14 16     /66 Comment: left  Pulse 82   Temp 97 °F (36.1 °C)   Ht 5' 6" (1.676 m)   Wt 86 kg (189 lb 9.6 oz)   SpO2 96%   BMI 30.60 kg/m²    --  10/21- 19 40     Called to follow up , spoke to her,  to address any concerns with the up coming surgery or any questions on Medication instructions -  Doing well ,No changes to Medication, Health -    Lab from today - Hb 11  HCT 34.3  PLT - N  BMP  N    RBC count low- not new     May of 2019     Hb 11.3   HCT 35    Hb, HCT - stable   -  10/21- 19 48     Suggested follow up regarding low RBC count  "

## 2019-10-21 NOTE — PROGRESS NOTES
Subjective:      Patient ID: Mirna Eason is a 64 y.o. female.    Chief Complaint: Pain of the Right Knee    HPI  Mirna Eason is a 64 year old female here with a greater than 10  year history of right knee pain. The patient is a   at [employer]. There was not a history of recent trauma.  The pain is severe The pain is located in the global aspect of the knee. There is is not radiation.  There is catching or locking.   The pain is described as achy. The patient has not had prior surgery. It is aggravated by sitting, standing and walking.  It is not alleviated by rest. There is not numbness or tingling of the lower extremity.  There is not back pain.  She  has tried medications ad injections. They have not helped.  She does have difficulty getting in or out of a car, getting dressed, or going up or down stairs.  The patient does not use an assistive device.    Past Medical History:   Diagnosis Date    Arthritis     GERD (gastroesophageal reflux disease)      Past Surgical History:   Procedure Laterality Date    appendectomy      fracture surgery- fell down thestairs- Ankle -       mechanical fall     LAPAROSCOPIC CHOLECYSTECTOMY      oophorectomy, Salpix- Rt -        Family History   Problem Relation Age of Onset    Heart disease Father 49        heart bypass     Social History     Socioeconomic History    Marital status:      Spouse name: Not on file    Number of children: Not on file    Years of education: Not on file    Highest education level: Not on file   Occupational History    Not on file   Social Needs    Financial resource strain: Not on file    Food insecurity:     Worry: Not on file     Inability: Not on file    Transportation needs:     Medical: Not on file     Non-medical: Not on file   Tobacco Use    Smoking status: Former Smoker     Years: 15.00     Last attempt to quit:      Years since quittin.8    Smokeless tobacco: Never Used   Substance and  Sexual Activity    Alcohol use: Not on file    Drug use: Never    Sexual activity: Not Currently   Lifestyle    Physical activity:     Days per week: Not on file     Minutes per session: Not on file    Stress: Not on file   Relationships    Social connections:     Talks on phone: Not on file     Gets together: Not on file     Attends Buddhism service: Not on file     Active member of club or organization: Not on file     Attends meetings of clubs or organizations: Not on file     Relationship status: Not on file   Other Topics Concern    Not on file   Social History Narrative    Not on file     Current Outpatient Medications on File Prior to Visit   Medication Sig Dispense Refill    calcium carbonate (TUMS ORAL) Take by mouth as needed (acid reflux).      naproxen sodium (ALEVE) 220 mg Cap Take 440 mg by mouth as needed (pain).      triamterene-hydrochlorothiazide 37.5-25 mg (DYAZIDE) 37.5-25 mg per capsule Take 1 capsule by mouth once daily.  3     No current facility-administered medications on file prior to visit.      Review of patient's allergies indicates:  No Known Allergies    Review of Systems   Constitution: Negative for chills, fever and night sweats.   HENT: Negative for hearing loss.    Eyes: Negative for blurred vision and double vision.   Cardiovascular: Negative for chest pain, claudication and leg swelling.   Respiratory: Negative for shortness of breath.    Endocrine: Negative for polydipsia, polyphagia and polyuria.   Hematologic/Lymphatic: Negative for adenopathy and bleeding problem. Does not bruise/bleed easily.   Skin: Negative for poor wound healing.   Musculoskeletal: Positive for joint pain.   Gastrointestinal: Negative for diarrhea and heartburn.   Genitourinary: Negative for bladder incontinence.   Neurological: Negative for focal weakness, headaches, numbness, paresthesias and sensory change.   Psychiatric/Behavioral: The patient is not nervous/anxious.   "  Allergic/Immunologic: Negative for persistent infections.         Objective:      Body mass index is 30.6 kg/m².  Vitals:    10/21/19 1036   Weight: 86 kg (189 lb 9.5 oz)   Height: 5' 6" (1.676 m)         General    Constitutional: She is oriented to person, place, and time. She appears well-developed and well-nourished.   HENT:   Head: Normocephalic and atraumatic.   Eyes: EOM are normal.   Cardiovascular: Normal rate.    Pulmonary/Chest: Effort normal.   Neurological: She is alert and oriented to person, place, and time.   Psychiatric: She has a normal mood and affect. Her behavior is normal.     General Musculoskeletal Exam   Gait: normal       Right Knee Exam     Inspection   Erythema: absent  Scars: absent  Swelling: present  Effusion: present  Deformity: present (mild varus)  Bruising: absent    Tenderness   The patient is tender to palpation of the medial joint line and lateral joint line.    Crepitus   The patient has crepitus of the patella.    Range of Motion   Extension: 0   Flexion: 110     Tests   Ligament Examination Lachman: normal (-1 to 2mm)   MCL - Valgus: normal (0 to 2mm)  LCL - Varus: normal  Patella   Passive Patellar Tilt: neutral  Patellar Grind: positive    Other   Sensation: normal    Left Knee Exam     Inspection   Erythema: absent  Scars: absent  Swelling: absent  Effusion: absent  Deformity: absent  Bruising: absent    Tenderness   The patient is experiencing no tenderness.     Range of Motion   Extension: 0   Flexion: 130     Tests   Stability Lachman: normal (-1 to 2mm)   MCL - Valgus: normal (0 to 2mm)  LCL - Varus: normal (0 to 2mm)  Patella   Passive Patellar Tilt: neutral    Other   Sensation: normal    Muscle Strength   Right Lower Extremity   Hip Abduction: 5/5   Quadriceps:  5/5   Hamstrin/5   Left Lower Extremity   Hip Abduction: 5/5   Quadriceps:  5/5   Hamstrin/5     Reflexes     Left Side  Quadriceps:  2+    Right Side   Quadriceps:  2+    Vascular Exam     Right " Pulses  Dorsalis Pedis:      2+          Left Pulses  Dorsalis Pedis:      2+          Edema  Right Lower Leg: absent  Left Lower Leg: absent      Radiographs taken today and reviewed by me demonstrate severe arthritic change of the right KNEE(s).There  is not bone destruction.  There is not a fracture.  The changes are tricompartmental.            Assessment:       Encounter Diagnosis   Name Primary?    Primary osteoarthritis of right knee Yes          Plan:       Mirna was seen today for pain.    Diagnoses and all orders for this visit:    Primary osteoarthritis of right knee      Treatment options were discussed. The surgical process of right knee replacement was discussed in detail with the patient including a detailed discussion of the procedure itself (including visual model, x-ray review, and literature review). The typical perioperative and post-operative course was discussed and perioperative risks were discussed to the patient's satisfaction.  Risks and complications discussed included but were not limited to the risks of anesthetic complications, infection, bleeding, wound healing complications, stiffness, aseptic loosening, instability, limb length inequality, neurologic dysfunction including numbness and weakness, additional surgery,  DVT, pulmonary embolism, perioperative medical risks (cardiac, pulmonary, renal, neurologic), and death and the patient elects to proceed.  The patient should get medically cleared and attend the joint seminar.      Probable Eliquis given history of possible DVT.

## 2019-10-21 NOTE — HPI
History of present illness- I had the pleasure of meeting this pleasant 64 y.o. lady in the pre op clinic prior to her elective Orthopedic surgery. The patient is new to me . Mirna was accompanied by sister Rashmi .    I have obtained the history by speaking to the patient and by reviewing the electronic health records.    Events leading up to surgery / History of presenting illness -    She has been troubled with moderate-severe  Right knee  pain for 1 year  . Pain increases with activity and decreases with resting.    Relevant health conditions of significance for the perioperative period/ History of presenting illness -    Subjectively describes health as good      Not known to have heart disease , Diabetes Mellitus, HTN, Lung disease -    In 2009   Had Rt lateral Thigh area discomfort - had it one day   Leading to ER visit   As per her ER MD felt that she had thrombosis , based on US   Hospitalized over night   History suggests possible ? Lovenox treatment   Was discharged home the next day  Was not on discharged on  Anticoagulation   She believes that she does not have thrombosis   She believes that it is muscle related as she was up and down the ladder that day   Still has some discomfort on walking   It is unclear that it she had thrombosis  The hospital where she was cared for is no longer open   As per her she had US March 1 st 2019 of the lower extremities and to her understanding no Thrombosis    History of ?DVT  No PE  Episode- 1  Associated with no reduced mobility, no long journeys, no cancer,no  prior surgery, no Hospital stay, no HRT,no tobacco use   Was working a lot at that time   IVC filter - None        2011 - Had gall bladder removal - symptoms leading to that - Chest tightness   Went to ER  Had Stress test, EKG - To her understanding - no problem with heart   Further testing showed reduced gall bladder function leading to gall bladder rwemoval  No chest discomfort since   Chest discomfort was  attributed to gall bladder problem      Works at [employer]   On her feet 8 hours a day   Was a    Currently not working      Lives alone - most of the times  Sister going to help post op

## 2019-10-21 NOTE — ASSESSMENT & PLAN NOTE
May 2019 - Anemia  Likely Aleve - related -took for 5 years    lately less usage   Last colonoscopy 10 years ago   No ulcer history   May 2019 - Hb 11.3   HCT - 35   Likely cause of Anemia is NSAID use     No overt GI/ bleeding     She feels that's he can handle ASA

## 2019-10-21 NOTE — ASSESSMENT & PLAN NOTE
On Dyazide for edema  Not known to have HTN  /80's     Edema- I suggested avoidance of added salt,avoidance of NSAID's, unless advised or ordered  and suggested Limb elevation and aundrea hose use

## 2019-10-21 NOTE — LETTER
October 21, 2019      Outside Doctor  [address] [address]           [facility]  [address], [address]  [facility] [address]  Phone: [phone]          Patient: Mirna Eason   MR Number: [id number]   YOB: 1955   Date of Visit: 10/21/2019       Dear Outside Doctor:    Thank you for referring Mirna Eason to me for evaluation. Attached you will find relevant portions of my assessment and plan of care.    If you have questions, please do not hesitate to call me. I look forward to following Mirna Eason along with you.    Sincerely,    Guillermina Roach PA-C    Enclosure  CC:  No Recipients    If you would like to receive this communication electronically, please contact [email or web address] or [phone] to request more information on [employer] Link access.    For providers and/or their staff who would like to refer a patient to [facility], please contact us through our one-stop-shop provider referral line, [facility]erv[facility]ieg[facility], at [phone].    If you feel you have received this communication in error or would no longer like to receive these types of communications, please e-mail [email or web address]

## 2019-10-21 NOTE — ASSESSMENT & PLAN NOTE
Left side   Takes Triamterene - HCTZ - daily for Meniere's which is also helping with lower extremity edema   No recent problem   Has been holding this Medication- for the past few days - ( Mis understanding ?) - mild ear fullness- since holding  Suggested resumption, suggested holding AM of surgery

## 2019-10-21 NOTE — LETTER
[facility]  [address]  [facility] [address]  Phone: [phone]

## 2019-10-21 NOTE — OUTPATIENT SUBJECTIVE & OBJECTIVE
Outpatient Subjective & Objective     Chief complaint-Preoperative evaluation, Perioperative Medical management, complication reduction plan     Active cardiac conditions- none    Revised cardiac risk index predictors- none    Functional capacity -Examples of physical activity, paces her self,   house work and can take a flight of stairs holding on to the railing, grocery , goes to [employer] ----- She can undertake all the above activities without  chest pain,chest tightness, Shortness of breath ,dizziness,lightheadedness making her exercise tolerance more,  than 4 Mets.       Review of Systems   Constitutional: Negative for chills and fever.        No unusual weight changes     HENT:        STOPBANG score  1/ 8        Age over 50        Eyes:        No new visual changes   Respiratory:        No cough , phlegm  No Hemoptysis   Cardiovascular:        As noted   Gastrointestinal:        No overt GI/ blood losses  Bowel movements- - Every other day - usual pattern  Suggested follow up    Endocrine:        Prednisone use > 20 mg daily for 3 weeks- none    Genitourinary: Negative for dysuria.        No urinary hesitancy    Musculoskeletal:        Bilateral knee pains   Skin: Negative for rash.   Neurological: Negative for syncope.        No unilateral weakness   Hematological:        Current use of Anticoagulants  None    Psychiatric/Behavioral:        No Depression,Anxiety     No vascular stenting     No past medical history pertinent negatives.  No family history on file.      No anesthesia, bleeding, cardiac problems, PONV with previous surgeries/procedures.  Medications and Allergies reviewed in epic.   FH- No anesthesia,bleeding / venous thrombosis  in family     Physical Exam      Physical Exam  Constitutional- General appearance-Conscious,Coherent  Eyes- No conjunctival icterus,pupils Left eye Medial deviation ,  round  and reactive to light   ENT-Oral cavity- moist  , Hearing grossly normal   Neck- No thyromegaly  ,Trachea -central, No jugular venous distension,   No Carotid Bruit   Cardiovascular -Heart Sounds- Normal  and  no murmur   , No gallop rhythm   Respiratory - Normal Respiratory Effort, Normal breath sounds,  no wheeze  and  no forced expiratory wheeze    Peripheral pitting pedal edema-- mild, no calf pain   Gastrointestinal -Soft abdomen, No palpable masses, Non Tender,Liver,Spleen not palpable. No-- free fluid and shifting dullness  Musculoskeletal- No finger Clubbing. Strength grossly normal   Lymphatic-No Palpable cervical, axillary,Inguinal lymphadenopathy   Psychiatric - normal effect,Orientation  Rt Dorsalis pedis pulses-palpable    Lt Dorsalis pedis pulses- palpable   Rt Posterior tibial pulses -palpable   Left posterior tibial pulses -palpable   Miscellaneous -  no asterixis,  no dupuytren's contracture,  Surgical scarlower abdomen ,left ankle , rash Under abdomen - uses Mono stat as needed - rash not bothering her- she deffed  any treratment for now ,  no renal bruit,  bowel sounds positive  and osteoarthritic nodes   Investigations  Lab and Imaging have been reviewed in epic.    Creatinine May 2019 - 0.83     Aug 2019 - INR 0.9    Non [facility] records - 2010   Cassidy scan Thallium testing- 2010 - No ischemia, LVEf 61 %    EKG July 2010- Sinus  Stress test - April 2012- no ischemia   LVEF - 55%  EKG 2016- No acute changes       Review of old records- Was done and information gathered regards to events leading to surgery and health conditions of significance in the perioperative period.    Outpatient Subjective & Objective

## 2019-10-21 NOTE — ANESTHESIA PREPROCEDURE EVALUATION
"                                                                                                             10/21/2019  Mirna Eason is a 64 y.o., female.    Anesthesia Evaluation         Review of Systems  Anesthesia Hx:  No problems with previous Anesthesia History of prior surgery of interest to airway management or planning: Previous anesthesia: General 2012: Kim with general anesthesia.  Denies Family Hx of Anesthesia complications.   Denies Personal Hx of Anesthesia complications.   Social:  Patient's occupation is [employer] . Tobacco Use:  (quit 2007) of cigarette for 15 years, 1 pack per day Denies Alcohol Use.   Hematology/Oncology:         -- Anemia:   EENT/Dental:   Eyes: Visual Impairment , Blind - left eye only  Ears General/Symptom(s) Ear Disease: Meniere disease: takes Dyazide Denies Throat Symptoms  Denies Jaw Problems   Cardiovascular:   Exercise tolerance: good Nuclear Stress test done in 2012 ( in Care Everywhere) due to chest pain-  gallbladder actual  cause of pain Functional Capacity good / => 4 METS, Before right knee worsened, was walking daily-- denies CP/SOB  Denies Deep Venous Thrombosis (DVT) Questionable Hx Right DVT- 2009; was hospitalized x 1 day. Pt. reports being given "shots in the stomach". Had follow-up with a doctor but was not prescribed any medication. No problems since then.  Denies Hypertension.    Pulmonary:  Pulmonary Normal  Denies Asthma.  Denies Chronic Obstructive Pulmonary Disease (COPD).  Denies Possible Obstructive Sleep Apnea    Hepatic/GI:   GERD  Esophageal / Stomach Disorders Gerd Controlled by PRN antireflux medication.  Denies Liver Disease    Musculoskeletal:   Arthritis     Neurological:   Denies Seizures.  Pain , location of right knee , severity is a 5  TIA - Transient Ischemic Attack , Most recent TIA was on 2000 , has had 1 TIA , transient deficits are no residual deficit.   Endocrine:  Endocrine Normal    Psych:  Psychiatric Normal   "         Physical Exam  General:  Obesity    Airway/Jaw/Neck:  Airway Findings: Mouth Opening: Small, but > 3cm General Airway Assessment: Possible difficult intubation  Mallampati: III  TM Distance: < 4 cm  Jaw/Neck Findings:  Micrognathia  Neck ROM: Normal ROM      Dental:  Dental Findings: In tact        Mental Status:  Mental Status Findings:  Cooperative, Alert and Oriented         Anesthesia Plan  Type of Anesthesia, risks & benefits discussed:  Anesthesia Type:  general, spinal  Patient's Preference:   Intra-op Monitoring Plan: standard ASA monitors  Intra-op Monitoring Plan Comments:   Post Op Pain Control Plan: multimodal analgesia, IV/PO Opioids PRN and peripheral nerve block  Post Op Pain Control Plan Comments: Opioids and analgesic adjuvants as needed  Regional blocks if applicable/indicated  Induction:   IV  Beta Blocker:  Patient is not currently on a Beta-Blocker (No further documentation required).       Informed Consent: Patient understands risks and agrees with Anesthesia plan.  Questions answered. Anesthesia consent signed with patient.  ASA Score: 3     Day of Surgery Review of History & Physical:    H&P update referred to the surgeon.     Anesthesia Plan Notes: I have personally evaluated the patient and discussed risk/benefits/alternatives of general anesthesia.        Ready For Surgery From Anesthesia Perspective.    The patient was seen by Perioperative Internal Medicine physician Dr. Bustillo on 10/21/19 , please see recommendations.      Discharge Plan: To home with sister (Rashmi Estrada: [phone])    Huang Ricketts RN

## 2019-10-21 NOTE — LETTER
October 21, 2019      Srikanth Jones MD  [address]  [place] [address]           [facility]  [address] [facility] [address]  Phone: [phone]          Patient: Mirna Eason   MR Number: [id number]   YOB: 1955   Date of Visit: 10/21/2019       Dear Dr. Srikanth Jones:    Thank you for referring Mirna Eason to me for evaluation. Attached you will find relevant portions of my assessment and plan of care.    If you have questions, please do not hesitate to call me. I look forward to following Mirna Eason along with you.    Sincerely,    Fátima Bustillo MD    Enclosure  CC:  No Recipients    If you would like to receive this communication electronically, please contact [email or web address] or [phone] to request more information on [employer] Link access.    For providers and/or their staff who would like to refer a patient to [facility], please contact us through our one-stop-shop provider referral line, [facility], at [phone].    If you feel you have received this communication in error or would no longer like to receive these types of communications, please e-mail [email or web address]

## 2019-10-21 NOTE — ASSESSMENT & PLAN NOTE
Left   Born with it   Inward,deviation Left eye   No recent problem   Functional  Care suggested with Rt eye since it is the only functioning  eye

## 2019-10-21 NOTE — DISCHARGE INSTRUCTIONS
Your surgery has been scheduled for:__________________________________________    You should report to:  ____[facility], located on the [place] side of the first floor of the           [facility] ([phone])  ____[facility], located on the [facility] side of the            Second floor of the [facility] ([phone])  ____3rd Floor [facility] located on the [facility] side of the [facility] [phone]  ____[facility]: located at [address] [place] [address]. Check in on the first floor of the hospital.  Please Note   - Tell your doctor if you take Aspirin, products containing Aspirin, herbal medications  or blood thinners, such as Coumadin, Ticlid, or Plavix.  (Consult your provider regarding holding or stopping before surgery).  - Arrange for someone to drive you home following surgery.  You will not be allowed to leave the surgical facility alone or drive yourself home following sedation and anesthesia.  Before Surgery  - Stop taking all herbal medications 14 days prior to surgery  - No Motrin/Advil (Ibuprofen)  1 day before surgery  - No Aleve (Naproxen) 7 days before surgery  - Stop Taking Asprin, products containing Asprin _____days before surgery  - Stop taking blood thinners_______days before surgery  - No Goody's/BC  Powder 7 days before surgery  - Refrain from drinking alcoholic beverages for 24hours before and after surgery  - Stop or limit smoking _________days before surgery  - You may take Tylenol for pain  Night before Surgery  - Do not eat or drink after midnight  - Take a shower or bath (shower is recommended).  Bathe with Hibiclens soap or an antibacterial soap from the neck down.  If not supplied by your surgeon, hibiclens soap will need to be purchased over the counter in pharmacy.  Rinse soap off thoroughly.  - Shampoo your hair with your regular  shampoo  The Day of Surgery  - Take another bath or shower with hibiclens or any antibacterial soap, to reduce the chance of infection.  - Take heart and blood pressure medications with a small sip of water, as advised by the perioperative team.  - Do not take fluid pills  - You may brush your teeth and rinse your mouth, but do not swall any additional water.   - Do not apply perfumes, powder, body lotions or deodorant on the day of surgery.  - Nail polish should be removed.  - Do not wear makeup or moisturizer  - Wear comfortable clothes, such as a button front shirt and loose fitting pants.  - Leave all jewelry, including body piercings, and valuables at home.    - Bring any devices you will neeed after surgery such as crutches or canes.  - If you have sleep apnea, please bring your CPAP machine  In the event that your physical condition changes including the onset of a cold or respiratory illness, or if you have to delay or cancel your surgery, please notify your surgeon.   Anesthesia: Regional Anesthesia    Youre scheduled for surgery. During surgery, youll receive medicine called anesthesia to keep you comfortable and pain-free. Your surgeon has decided that youll receive regional anesthesia. This sheet tells you what to expect with this type of anesthesia.  What is regional anesthesia?  Regional anesthesia numbs one region of your body. The anesthesia may be given around nerves or into veins in your arms, neck, or legs (nerve block or Harman block). Or it may be sent into the spinal fluid (spinal anesthesia) or into the space just outside the spinal fluid (epidural anesthesia). You may also be given sedatives to help you relax.  Nerve block or [place] block  A small area of the body, such as an arm or leg, can be numbed using a nerve block or Harman block.  · Nerve block. During a nerve block, your skin is numbed. A needle is then inserted near nerves that serve the area to be numbed. Anesthetic is sent through  the needle.  · IV regional or Snyder block. For this type of block, an IV line is put into a vein. The blood flow to the area to be numbed is blocked for a short time. Anesthetic is sent through the IV.  Spinal anesthesia  Spinal anesthesia numbs your body from about the waist down.  · Anesthetic is injected into the spinal fluid. This is a substance that surrounds the spinal cord in your spinal column. The anesthetic blocks pain traveling from the body to the brain.  · To receive the anesthetic, your skin is numbed at the injection site on your back.  · A needle is then inserted into the spinal space. Anesthetic is sent into the spinal fluid through the needle.  Epidural anesthesia  Epidural anesthesia is most commonly used during childbirth and may also be used after surgical procedures of the chest, belly, and legs.  · Anesthetic is injected into the epidural space. This is just outside the dural sac which contains the spinal fluid.  · To receive the anesthetic, your skin is numbed at the injection site on your back.  · A needle is then inserted into the epidural space. Anesthetic is sent into the epidural space through the needle.  · A small flexible catheter may be attached to the needle and left in place. This allows for continuous injections or infusions of anesthetic.  Anesthesia tools and medicines that might be near you during your procedure  · Local anesthetic. This medicine is given through a needle numbs one region of your body.  · Electrocardiography leads (electrodes). These are used to record your heart rate and rhythm.  · Blood pressure cuff. A cuff is placed on your arm to keep track of your blood pressure.  · Pulse oximeter. This small clip is placed on the end of the finger. It measures your blood oxygen level.  · Sedatives. These medicines may be given through an IV. They help to relax you and keep you comfortable. You may stay awake or sleep lightly.  · Oxygen. You may be given oxygen through a  facemask.  Risks and possible complications  Regional anesthesia carries some risks. These include:  · Nausea and vomiting  · Headache  · Backache  · Decreased blood pressure  · Allergic reaction to the anesthetic  · Ongoing numbness (rare)  · Irregular heartbeat (rare)  · Cardiac arrest (rare)   Date Last Reviewed: 12/1/2016  © 1934-3224 [employer]. [address] [place], [place] [id number]. All rights reserved. This information is not intended as a substitute for professional medical care. Always follow your healthcare professional's instructions.

## 2019-10-22 ENCOUNTER — ANESTHESIA (OUTPATIENT)
Dept: SURGERY | Facility: HOSPITAL | Age: 64
DRG: 470 | End: 2019-10-22
Payer: COMMERCIAL

## 2019-10-22 ENCOUNTER — HOSPITAL ENCOUNTER (INPATIENT)
Facility: HOSPITAL | Age: 64
LOS: 1 days | Discharge: HOME | DRG: 470 | End: 2019-10-23
Attending: ORTHOPAEDIC SURGERY | Admitting: ORTHOPAEDIC SURGERY
Payer: COMMERCIAL

## 2019-10-22 DIAGNOSIS — M17.11 PRIMARY OSTEOARTHRITIS OF RIGHT KNEE: ICD-10-CM

## 2019-10-22 PROCEDURE — 99900035 HC TECH TIME PER 15 MIN (STAT)

## 2019-10-22 PROCEDURE — 27447 PR TOTAL KNEE ARTHROPLASTY: ICD-10-PCS | Mod: RT,COE,, | Performed by: ORTHOPAEDIC SURGERY

## 2019-10-22 PROCEDURE — 27100025 HC TUBING, SET FLUID WARMER: Performed by: NURSE ANESTHETIST, CERTIFIED REGISTERED

## 2019-10-22 PROCEDURE — 25000003 PHARM REV CODE 250: Performed by: ORTHOPAEDIC SURGERY

## 2019-10-22 PROCEDURE — D9220A PRA ANESTHESIA: Mod: CRNA,COE,, | Performed by: NURSE ANESTHETIST, CERTIFIED REGISTERED

## 2019-10-22 PROCEDURE — 25000003 PHARM REV CODE 250: Performed by: PHYSICIAN ASSISTANT

## 2019-10-22 PROCEDURE — 25000003 PHARM REV CODE 250: Performed by: ANESTHESIOLOGY

## 2019-10-22 PROCEDURE — 94799 UNLISTED PULMONARY SVC/PX: CPT

## 2019-10-22 PROCEDURE — 94761 N-INVAS EAR/PLS OXIMETRY MLT: CPT

## 2019-10-22 PROCEDURE — 94770 HC EXHALED C02 TEST: CPT

## 2019-10-22 PROCEDURE — 88305 TISSUE SPECIMEN TO PATHOLOGY - SURGERY: ICD-10-PCS | Mod: 26,COE,, | Performed by: PATHOLOGY

## 2019-10-22 PROCEDURE — 37000009 HC ANESTHESIA EA ADD 15 MINS: Performed by: ORTHOPAEDIC SURGERY

## 2019-10-22 PROCEDURE — 37000008 HC ANESTHESIA 1ST 15 MINUTES: Performed by: ORTHOPAEDIC SURGERY

## 2019-10-22 PROCEDURE — 64448 PR NERVE BLOCK INJ, ANES/STEROID, FEMORAL, CONT INFUSION, INCL IMAG GUIDANCE: ICD-10-PCS | Mod: 59,RT,COE, | Performed by: ANESTHESIOLOGY

## 2019-10-22 PROCEDURE — 27447 TOTAL KNEE ARTHROPLASTY: CPT | Mod: RT,COE,, | Performed by: ORTHOPAEDIC SURGERY

## 2019-10-22 PROCEDURE — 88311 DECALCIFY TISSUE: CPT | Performed by: PATHOLOGY

## 2019-10-22 PROCEDURE — 71000033 HC RECOVERY, INTIAL HOUR: Performed by: ORTHOPAEDIC SURGERY

## 2019-10-22 PROCEDURE — D9220A PRA ANESTHESIA: Mod: ANES,COE,, | Performed by: ANESTHESIOLOGY

## 2019-10-22 PROCEDURE — 88305 TISSUE EXAM BY PATHOLOGIST: CPT | Mod: 26,COE,, | Performed by: PATHOLOGY

## 2019-10-22 PROCEDURE — 71000039 HC RECOVERY, EACH ADD'L HOUR: Performed by: ORTHOPAEDIC SURGERY

## 2019-10-22 PROCEDURE — 97535 SELF CARE MNGMENT TRAINING: CPT

## 2019-10-22 PROCEDURE — 36000711: Performed by: ORTHOPAEDIC SURGERY

## 2019-10-22 PROCEDURE — 36000710: Performed by: ORTHOPAEDIC SURGERY

## 2019-10-22 PROCEDURE — 97161 PT EVAL LOW COMPLEX 20 MIN: CPT

## 2019-10-22 PROCEDURE — 63600175 PHARM REV CODE 636 W HCPCS: Performed by: NURSE ANESTHETIST, CERTIFIED REGISTERED

## 2019-10-22 PROCEDURE — D9220A PRA ANESTHESIA: ICD-10-PCS | Mod: ANES,COE,, | Performed by: ANESTHESIOLOGY

## 2019-10-22 PROCEDURE — 76942 ECHO GUIDE FOR BIOPSY: CPT | Performed by: ANESTHESIOLOGY

## 2019-10-22 PROCEDURE — 88311 TISSUE SPECIMEN TO PATHOLOGY - SURGERY: ICD-10-PCS | Mod: 26,COE,, | Performed by: PATHOLOGY

## 2019-10-22 PROCEDURE — 97116 GAIT TRAINING THERAPY: CPT

## 2019-10-22 PROCEDURE — 64448 NJX AA&/STRD FEM NRV NFS IMG: CPT | Performed by: ANESTHESIOLOGY

## 2019-10-22 PROCEDURE — D9220A PRA ANESTHESIA: ICD-10-PCS | Mod: CRNA,COE,, | Performed by: NURSE ANESTHETIST, CERTIFIED REGISTERED

## 2019-10-22 PROCEDURE — 63600175 PHARM REV CODE 636 W HCPCS: Performed by: PHYSICIAN ASSISTANT

## 2019-10-22 PROCEDURE — 76942 PR U/S GUIDANCE FOR NEEDLE GUIDANCE: ICD-10-PCS | Mod: 26,COE,, | Performed by: ANESTHESIOLOGY

## 2019-10-22 PROCEDURE — 11000001 HC ACUTE MED/SURG PRIVATE ROOM

## 2019-10-22 PROCEDURE — 63600175 PHARM REV CODE 636 W HCPCS: Performed by: ANESTHESIOLOGY

## 2019-10-22 PROCEDURE — C1776 JOINT DEVICE (IMPLANTABLE): HCPCS | Performed by: ORTHOPAEDIC SURGERY

## 2019-10-22 PROCEDURE — 27201423 OPTIME MED/SURG SUP & DEVICES STERILE SUPPLY: Performed by: ORTHOPAEDIC SURGERY

## 2019-10-22 PROCEDURE — C1751 CATH, INF, PER/CENT/MIDLINE: HCPCS | Performed by: ANESTHESIOLOGY

## 2019-10-22 PROCEDURE — C1713 ANCHOR/SCREW BN/BN,TIS/BN: HCPCS | Performed by: ORTHOPAEDIC SURGERY

## 2019-10-22 PROCEDURE — 97165 OT EVAL LOW COMPLEX 30 MIN: CPT

## 2019-10-22 PROCEDURE — 25000003 PHARM REV CODE 250: Performed by: NURSE ANESTHETIST, CERTIFIED REGISTERED

## 2019-10-22 PROCEDURE — 76942 ECHO GUIDE FOR BIOPSY: CPT | Mod: 26,COE,, | Performed by: ANESTHESIOLOGY

## 2019-10-22 PROCEDURE — 88311 DECALCIFY TISSUE: CPT | Mod: 26,COE,, | Performed by: PATHOLOGY

## 2019-10-22 PROCEDURE — 64448 NJX AA&/STRD FEM NRV NFS IMG: CPT | Mod: 59,RT,COE, | Performed by: ANESTHESIOLOGY

## 2019-10-22 DEVICE — BASEPLATE TIB CEM PRIM SZ 4: Type: IMPLANTABLE DEVICE | Site: KNEE | Status: FUNCTIONAL

## 2019-10-22 DEVICE — INSERT TRIATHLON SZ4 11MM: Type: IMPLANTABLE DEVICE | Site: KNEE | Status: FUNCTIONAL

## 2019-10-22 DEVICE — CEMENT BONE WHOLE BATCH: Type: IMPLANTABLE DEVICE | Site: KNEE | Status: FUNCTIONAL

## 2019-10-22 DEVICE — COMP FEM POST STAB CEM SZ 4 RT: Type: IMPLANTABLE DEVICE | Site: KNEE | Status: FUNCTIONAL

## 2019-10-22 DEVICE — PATELLA TRIATHLON 39X11 SYMTRC: Type: IMPLANTABLE DEVICE | Site: KNEE | Status: FUNCTIONAL

## 2019-10-22 RX ORDER — TRIAMTERENE AND HYDROCHLOROTHIAZIDE 37.5; 25 MG/1; MG/1
1 CAPSULE ORAL DAILY
Status: DISCONTINUED | OUTPATIENT
Start: 2019-10-23 | End: 2019-10-23 | Stop reason: HOSPADM

## 2019-10-22 RX ORDER — ACETAMINOPHEN 10 MG/ML
1000 INJECTION, SOLUTION INTRAVENOUS ONCE
Status: COMPLETED | OUTPATIENT
Start: 2019-10-22 | End: 2019-10-22

## 2019-10-22 RX ORDER — LIDOCAINE HYDROCHLORIDE 10 MG/ML
1 INJECTION, SOLUTION EPIDURAL; INFILTRATION; INTRACAUDAL; PERINEURAL
Status: DISCONTINUED | OUTPATIENT
Start: 2019-10-22 | End: 2019-10-22 | Stop reason: HOSPADM

## 2019-10-22 RX ORDER — CEFAZOLIN SODIUM 1 G/3ML
2 INJECTION, POWDER, FOR SOLUTION INTRAMUSCULAR; INTRAVENOUS
Status: COMPLETED | OUTPATIENT
Start: 2019-10-22 | End: 2019-10-23

## 2019-10-22 RX ORDER — MIDAZOLAM HYDROCHLORIDE 1 MG/ML
1 INJECTION INTRAMUSCULAR; INTRAVENOUS EVERY 5 MIN PRN
Status: DISCONTINUED | OUTPATIENT
Start: 2019-10-22 | End: 2019-10-22 | Stop reason: HOSPADM

## 2019-10-22 RX ORDER — PHENYLEPHRINE HYDROCHLORIDE 10 MG/ML
INJECTION INTRAVENOUS
Status: DISCONTINUED | OUTPATIENT
Start: 2019-10-22 | End: 2019-10-22

## 2019-10-22 RX ORDER — ROPIVACAINE HYDROCHLORIDE 2 MG/ML
8 INJECTION, SOLUTION EPIDURAL; INFILTRATION; PERINEURAL CONTINUOUS
Status: DISCONTINUED | OUTPATIENT
Start: 2019-10-22 | End: 2019-10-23 | Stop reason: HOSPADM

## 2019-10-22 RX ORDER — KETAMINE HYDROCHLORIDE 10 MG/ML
INJECTION, SOLUTION INTRAMUSCULAR; INTRAVENOUS
Status: DISCONTINUED | OUTPATIENT
Start: 2019-10-22 | End: 2019-10-22

## 2019-10-22 RX ORDER — CELECOXIB 100 MG/1
200 CAPSULE ORAL DAILY
Status: DISCONTINUED | OUTPATIENT
Start: 2019-10-23 | End: 2019-10-23 | Stop reason: HOSPADM

## 2019-10-22 RX ORDER — CEFAZOLIN SODIUM 1 G/3ML
2 INJECTION, POWDER, FOR SOLUTION INTRAMUSCULAR; INTRAVENOUS
Status: COMPLETED | OUTPATIENT
Start: 2019-10-22 | End: 2019-10-22

## 2019-10-22 RX ORDER — MORPHINE SULFATE 2 MG/ML
2 INJECTION, SOLUTION INTRAMUSCULAR; INTRAVENOUS
Status: DISCONTINUED | OUTPATIENT
Start: 2019-10-22 | End: 2019-10-22

## 2019-10-22 RX ORDER — FAMOTIDINE 20 MG/1
20 TABLET, FILM COATED ORAL 2 TIMES DAILY
Status: DISCONTINUED | OUTPATIENT
Start: 2019-10-22 | End: 2019-10-23 | Stop reason: HOSPADM

## 2019-10-22 RX ORDER — LIDOCAINE HCL/PF 100 MG/5ML
SYRINGE (ML) INTRAVENOUS
Status: DISCONTINUED | OUTPATIENT
Start: 2019-10-22 | End: 2019-10-22

## 2019-10-22 RX ORDER — ONDANSETRON 2 MG/ML
INJECTION INTRAMUSCULAR; INTRAVENOUS
Status: DISCONTINUED | OUTPATIENT
Start: 2019-10-22 | End: 2019-10-22

## 2019-10-22 RX ORDER — SODIUM CHLORIDE 9 MG/ML
INJECTION, SOLUTION INTRAVENOUS CONTINUOUS
Status: ACTIVE | OUTPATIENT
Start: 2019-10-22 | End: 2019-10-23

## 2019-10-22 RX ORDER — OXYCODONE HYDROCHLORIDE 5 MG/1
5 TABLET ORAL
Status: DISCONTINUED | OUTPATIENT
Start: 2019-10-22 | End: 2019-10-22

## 2019-10-22 RX ORDER — MORPHINE SULFATE 2 MG/ML
2 INJECTION, SOLUTION INTRAMUSCULAR; INTRAVENOUS
Status: DISCONTINUED | OUTPATIENT
Start: 2019-10-22 | End: 2019-10-23 | Stop reason: HOSPADM

## 2019-10-22 RX ORDER — BISACODYL 10 MG
10 SUPPOSITORY, RECTAL RECTAL EVERY 12 HOURS PRN
Status: DISCONTINUED | OUTPATIENT
Start: 2019-10-22 | End: 2019-10-23 | Stop reason: HOSPADM

## 2019-10-22 RX ORDER — SODIUM CHLORIDE 9 MG/ML
INJECTION, SOLUTION INTRAVENOUS CONTINUOUS PRN
Status: DISCONTINUED | OUTPATIENT
Start: 2019-10-22 | End: 2019-10-22

## 2019-10-22 RX ORDER — OXYCODONE HYDROCHLORIDE 5 MG/1
15 TABLET ORAL
Status: DISCONTINUED | OUTPATIENT
Start: 2019-10-22 | End: 2019-10-22

## 2019-10-22 RX ORDER — OXYCODONE HYDROCHLORIDE 5 MG/1
5 TABLET ORAL
Status: DISCONTINUED | OUTPATIENT
Start: 2019-10-22 | End: 2019-10-23 | Stop reason: HOSPADM

## 2019-10-22 RX ORDER — ASPIRIN 81 MG/1
81 TABLET ORAL 2 TIMES DAILY
Status: COMPLETED | OUTPATIENT
Start: 2019-10-22 | End: 2019-10-22

## 2019-10-22 RX ORDER — DEXAMETHASONE SODIUM PHOSPHATE 4 MG/ML
INJECTION, SOLUTION INTRA-ARTICULAR; INTRALESIONAL; INTRAMUSCULAR; INTRAVENOUS; SOFT TISSUE
Status: DISCONTINUED | OUTPATIENT
Start: 2019-10-22 | End: 2019-10-22

## 2019-10-22 RX ORDER — AMOXICILLIN 250 MG
1 CAPSULE ORAL 2 TIMES DAILY
Status: DISCONTINUED | OUTPATIENT
Start: 2019-10-22 | End: 2019-10-23 | Stop reason: HOSPADM

## 2019-10-22 RX ORDER — SODIUM CHLORIDE 9 MG/ML
INJECTION, SOLUTION INTRAVENOUS
Status: COMPLETED | OUTPATIENT
Start: 2019-10-22 | End: 2019-10-22

## 2019-10-22 RX ORDER — PROPOFOL 10 MG/ML
VIAL (ML) INTRAVENOUS CONTINUOUS PRN
Status: DISCONTINUED | OUTPATIENT
Start: 2019-10-22 | End: 2019-10-22

## 2019-10-22 RX ORDER — FENTANYL CITRATE 50 UG/ML
25 INJECTION, SOLUTION INTRAMUSCULAR; INTRAVENOUS EVERY 5 MIN PRN
Status: DISCONTINUED | OUTPATIENT
Start: 2019-10-22 | End: 2019-10-22 | Stop reason: HOSPADM

## 2019-10-22 RX ORDER — MIDAZOLAM HYDROCHLORIDE 1 MG/ML
0.5 INJECTION INTRAMUSCULAR; INTRAVENOUS
Status: DISCONTINUED | OUTPATIENT
Start: 2019-10-22 | End: 2019-10-22 | Stop reason: HOSPADM

## 2019-10-22 RX ORDER — PROPOFOL 10 MG/ML
VIAL (ML) INTRAVENOUS
Status: DISCONTINUED | OUTPATIENT
Start: 2019-10-22 | End: 2019-10-22

## 2019-10-22 RX ORDER — RAMELTEON 8 MG/1
8 TABLET ORAL NIGHTLY PRN
Status: DISCONTINUED | OUTPATIENT
Start: 2019-10-22 | End: 2019-10-23 | Stop reason: HOSPADM

## 2019-10-22 RX ORDER — PREGABALIN 150 MG/1
150 CAPSULE ORAL NIGHTLY
Status: DISCONTINUED | OUTPATIENT
Start: 2019-10-22 | End: 2019-10-23 | Stop reason: HOSPADM

## 2019-10-22 RX ORDER — PREGABALIN 75 MG/1
150 CAPSULE ORAL
Status: COMPLETED | OUTPATIENT
Start: 2019-10-22 | End: 2019-10-22

## 2019-10-22 RX ORDER — ONDANSETRON 2 MG/ML
4 INJECTION INTRAMUSCULAR; INTRAVENOUS EVERY 8 HOURS PRN
Status: DISCONTINUED | OUTPATIENT
Start: 2019-10-22 | End: 2019-10-23 | Stop reason: HOSPADM

## 2019-10-22 RX ORDER — ROPIVACAINE HYDROCHLORIDE 5 MG/ML
INJECTION, SOLUTION EPIDURAL; INFILTRATION; PERINEURAL
Status: COMPLETED | OUTPATIENT
Start: 2019-10-22 | End: 2019-10-22

## 2019-10-22 RX ORDER — SODIUM CHLORIDE 0.9 % (FLUSH) 0.9 %
10 SYRINGE (ML) INJECTION
Status: DISCONTINUED | OUTPATIENT
Start: 2019-10-22 | End: 2019-10-22 | Stop reason: HOSPADM

## 2019-10-22 RX ORDER — OXYCODONE HYDROCHLORIDE 5 MG/1
10 TABLET ORAL
Status: DISCONTINUED | OUTPATIENT
Start: 2019-10-22 | End: 2019-10-22

## 2019-10-22 RX ORDER — MUPIROCIN 20 MG/G
1 OINTMENT TOPICAL 2 TIMES DAILY
Status: DISCONTINUED | OUTPATIENT
Start: 2019-10-22 | End: 2019-10-23 | Stop reason: HOSPADM

## 2019-10-22 RX ORDER — MIDAZOLAM HYDROCHLORIDE 1 MG/ML
INJECTION, SOLUTION INTRAMUSCULAR; INTRAVENOUS
Status: DISCONTINUED | OUTPATIENT
Start: 2019-10-22 | End: 2019-10-22

## 2019-10-22 RX ORDER — ACETAMINOPHEN 500 MG
1000 TABLET ORAL EVERY 6 HOURS
Status: DISCONTINUED | OUTPATIENT
Start: 2019-10-22 | End: 2019-10-23 | Stop reason: HOSPADM

## 2019-10-22 RX ORDER — POLYETHYLENE GLYCOL 3350 17 G/17G
17 POWDER, FOR SOLUTION ORAL DAILY
Status: DISCONTINUED | OUTPATIENT
Start: 2019-10-22 | End: 2019-10-23 | Stop reason: HOSPADM

## 2019-10-22 RX ORDER — NALOXONE HCL 0.4 MG/ML
0.02 VIAL (ML) INJECTION
Status: DISCONTINUED | OUTPATIENT
Start: 2019-10-22 | End: 2019-10-23 | Stop reason: HOSPADM

## 2019-10-22 RX ORDER — MUPIROCIN 20 MG/G
1 OINTMENT TOPICAL
Status: COMPLETED | OUTPATIENT
Start: 2019-10-22 | End: 2019-10-22

## 2019-10-22 RX ORDER — OXYCODONE HYDROCHLORIDE 10 MG/1
10 TABLET ORAL
Status: DISCONTINUED | OUTPATIENT
Start: 2019-10-22 | End: 2019-10-23 | Stop reason: HOSPADM

## 2019-10-22 RX ORDER — CELECOXIB 200 MG/1
400 CAPSULE ORAL
Status: COMPLETED | OUTPATIENT
Start: 2019-10-22 | End: 2019-10-22

## 2019-10-22 RX ADMIN — POLYETHYLENE GLYCOL 3350 17 G: 17 POWDER, FOR SOLUTION ORAL at 04:10

## 2019-10-22 RX ADMIN — MIDAZOLAM HYDROCHLORIDE 2 MG: 1 INJECTION, SOLUTION INTRAMUSCULAR; INTRAVENOUS at 07:10

## 2019-10-22 RX ADMIN — ROPIVACAINE HYDROCHLORIDE 8 ML/HR: 2 INJECTION, SOLUTION EPIDURAL; INFILTRATION at 11:10

## 2019-10-22 RX ADMIN — FAMOTIDINE 20 MG: 20 TABLET ORAL at 09:10

## 2019-10-22 RX ADMIN — OXYCODONE HYDROCHLORIDE 10 MG: 10 TABLET ORAL at 11:10

## 2019-10-22 RX ADMIN — ASPIRIN 81 MG: 81 TABLET, COATED ORAL at 11:10

## 2019-10-22 RX ADMIN — OXYCODONE HYDROCHLORIDE 5 MG: 5 TABLET ORAL at 06:10

## 2019-10-22 RX ADMIN — LIDOCAINE HYDROCHLORIDE 60 MG: 20 INJECTION, SOLUTION INTRAVENOUS at 08:10

## 2019-10-22 RX ADMIN — PREGABALIN 150 MG: 75 CAPSULE ORAL at 06:10

## 2019-10-22 RX ADMIN — PHENYLEPHRINE HYDROCHLORIDE 100 MCG: 10 INJECTION INTRAVENOUS at 09:10

## 2019-10-22 RX ADMIN — SODIUM CHLORIDE, SODIUM GLUCONATE, SODIUM ACETATE, POTASSIUM CHLORIDE, MAGNESIUM CHLORIDE, SODIUM PHOSPHATE, DIBASIC, AND POTASSIUM PHOSPHATE: .53; .5; .37; .037; .03; .012; .00082 INJECTION, SOLUTION INTRAVENOUS at 09:10

## 2019-10-22 RX ADMIN — ACETAMINOPHEN 1000 MG: 10 INJECTION, SOLUTION INTRAVENOUS at 10:10

## 2019-10-22 RX ADMIN — MUPIROCIN 1 G: 20 OINTMENT TOPICAL at 06:10

## 2019-10-22 RX ADMIN — SODIUM CHLORIDE: 0.9 INJECTION, SOLUTION INTRAVENOUS at 10:10

## 2019-10-22 RX ADMIN — FENTANYL CITRATE 25 MCG: 50 INJECTION INTRAMUSCULAR; INTRAVENOUS at 07:10

## 2019-10-22 RX ADMIN — CELECOXIB 400 MG: 200 CAPSULE ORAL at 06:10

## 2019-10-22 RX ADMIN — SODIUM CHLORIDE: 0.9 INJECTION, SOLUTION INTRAVENOUS at 06:10

## 2019-10-22 RX ADMIN — PROPOFOL 50 MCG/KG/MIN: 10 INJECTION, EMULSION INTRAVENOUS at 08:10

## 2019-10-22 RX ADMIN — SODIUM CHLORIDE: 0.9 INJECTION, SOLUTION INTRAVENOUS at 11:10

## 2019-10-22 RX ADMIN — KETAMINE HYDROCHLORIDE 20 MG: 10 INJECTION INTRAMUSCULAR; INTRAVENOUS at 08:10

## 2019-10-22 RX ADMIN — ROPIVACAINE HYDROCHLORIDE 8 ML/HR: 2 INJECTION, SOLUTION EPIDURAL; INFILTRATION at 10:10

## 2019-10-22 RX ADMIN — SENNOSIDES, DOCUSATE SODIUM 1 TABLET: 50; 8.6 TABLET, FILM COATED ORAL at 09:10

## 2019-10-22 RX ADMIN — ACETAMINOPHEN 1000 MG: 500 TABLET ORAL at 09:10

## 2019-10-22 RX ADMIN — SODIUM CHLORIDE: 0.9 INJECTION, SOLUTION INTRAVENOUS at 07:10

## 2019-10-22 RX ADMIN — ACETAMINOPHEN 1000 MG: 500 TABLET ORAL at 04:10

## 2019-10-22 RX ADMIN — CEFAZOLIN 2 G: 1 INJECTION, POWDER, FOR SOLUTION INTRAMUSCULAR; INTRAVENOUS at 04:10

## 2019-10-22 RX ADMIN — ROPIVACAINE HYDROCHLORIDE 10 ML: 5 INJECTION, SOLUTION EPIDURAL; INFILTRATION; PERINEURAL at 07:10

## 2019-10-22 RX ADMIN — ONDANSETRON 4 MG: 2 INJECTION INTRAMUSCULAR; INTRAVENOUS at 09:10

## 2019-10-22 RX ADMIN — MEPIVACAINE HYDROCHLORIDE 3 ML: 15 INJECTION, SOLUTION EPIDURAL; INFILTRATION at 08:10

## 2019-10-22 RX ADMIN — PREGABALIN 150 MG: 150 CAPSULE ORAL at 09:10

## 2019-10-22 RX ADMIN — MIDAZOLAM 1 MG: 1 INJECTION INTRAMUSCULAR; INTRAVENOUS at 08:10

## 2019-10-22 RX ADMIN — VANCOMYCIN HYDROCHLORIDE 1500 MG: 1.5 INJECTION, POWDER, LYOPHILIZED, FOR SOLUTION INTRAVENOUS at 06:10

## 2019-10-22 RX ADMIN — ASPIRIN 81 MG: 81 TABLET, COATED ORAL at 09:10

## 2019-10-22 RX ADMIN — CEFAZOLIN 2 G: 330 INJECTION, POWDER, FOR SOLUTION INTRAMUSCULAR; INTRAVENOUS at 08:10

## 2019-10-22 RX ADMIN — DEXAMETHASONE SODIUM PHOSPHATE 8 MG: 4 INJECTION, SOLUTION INTRAMUSCULAR; INTRAVENOUS at 08:10

## 2019-10-22 RX ADMIN — MUPIROCIN 1 G: 20 OINTMENT TOPICAL at 09:10

## 2019-10-22 RX ADMIN — PROPOFOL 30 MG: 10 INJECTION, EMULSION INTRAVENOUS at 08:10

## 2019-10-22 NOTE — H&P (VIEW-ONLY)
CC: Right knee pain    Mirna Eason is a 64 y.o. female with 10 year history of right knee pain. Pain is worse with activity and weight bearing.  Patient has experienced interference of activities of daily living due to decreased range of motion and an increase in joint pain and swelling.  Patient has failed non-operative treatment including NSAIDs, injections, and activity modification.  Mirna Eason currently ambulates independently.     Relevant medical conditions of significance in perioperative period:  H/o tobacco abuse: quit smoking 2002  H/o RLE DVT: 2019, questionable  GERD: uses tums       Past Medical History:   Diagnosis Date    Arthritis     GERD (gastroesophageal reflux disease)        Past Surgical History:   Procedure Laterality Date    appendectomy  1980's    fracture surgery- fell down thestairs- Ankle - 80's      mechanical fall     LAPAROSCOPIC CHOLECYSTECTOMY      oophorectomy, Salpix- Rt - 1980's         Family History   Problem Relation Age of Onset    Heart disease Father 49        heart bypass       Review of patient's allergies indicates:  No Known Allergies      Current Outpatient Medications:     calcium carbonate (TUMS ORAL), Take by mouth as needed (acid reflux)., Disp: , Rfl:     naproxen sodium (ALEVE) 220 mg Cap, Take 440 mg by mouth as needed (pain)., Disp: , Rfl:     triamterene-hydrochlorothiazide 37.5-25 mg (DYAZIDE) 37.5-25 mg per capsule, Take 1 capsule by mouth once daily., Disp: , Rfl: 3    acetaminophen (TYLENOL) 650 MG TbSR, Take 1 tablet (650 mg total) by mouth every 8 (eight) hours as needed., Disp: 120 tablet, Rfl: 0    [START ON 10/23/2019] apixaban (ELIQUIS) 2.5 mg Tab, Take 1 tablet (2.5 mg total) by mouth 2 (two) times daily., Disp: 60 tablet, Rfl: 0    docusate sodium (COLACE) 100 MG capsule, Take 1 capsule (100 mg total) by mouth 2 (two) times daily as needed for Constipation., Disp: 60 capsule, Rfl: 0    oxyCODONE (ROXICODONE) 5 MG immediate release  "tablet, Take 1-2 tabs by mouth every 4-6 hours as needed for pain, Disp: 50 tablet, Rfl: 0    Review of Systems:  Constitutional: no fever or chills  Eyes: no visual changes  ENT: no nasal congestion or sore throat  Respiratory: no cough or shortness of breath  Cardiovascular: no chest pain or palpitations  Gastrointestinal: no nausea or vomiting, tolerating diet  Genitourinary: no hematuria or dysuria  Integument/Breast: no rash or pruritis  Hematologic/Lymphatic: no easy bruising or lymphadenopathy  Musculoskeletal: positive for right knee pain  Neurological: no seizures or tremors  Behavioral/Psych: no auditory or visual hallucinations  Endocrine: no heat or cold intolerance    PE:  /77 (BP Location: Left arm, Patient Position: Sitting, BP Method: Medium (Automatic))   Pulse 80   Temp 97.2 °F (36.2 °C)   Ht 5' 6" (1.676 m)   Wt 86 kg (189 lb 9.6 oz)   BMI 30.60 kg/m²   General: Pleasant, cooperative, NAD   Gait: antalgic  HEENT: NCAT, sclera nonicteric   Lungs: Respirations clear bilaterally; equal and unlabored.   CV: S1S2; 2+ bilateral upper and lower extremity pulses.   Skin: Intact throughout with no rashes, erythema, or lesions  Extremities: No LE edema,  no erythema or warmth of the skin in either lower extremity.    Right knee exam:  Knee Range of Motion:0-110, pain with passive range of motion  Effusion:yes  Condition of skin: intact   Location of tenderness: medial joint line    Strength:5/5 quad and hamstring strength  Stability:stable to testing    Hip Examination: no pain with PROM    Radiographs: Radiographs reveal advanced degenerative changes including subchondral cyst formation, subchondral sclerosis, osteophyte formation, joint space narrowing.     Knee Alignment: varus     Diagnosis: osteoarthritis right knee    Plan: Right total knee arthroplasty    Due to the serious nature of total joint infection and the high prevalence of community acquired MRSA, vancomycin will be used " perioperatively.

## 2019-10-22 NOTE — ANESTHESIA POSTPROCEDURE EVALUATION
Anesthesia Post Evaluation    Patient: Mirna Eason    Procedure(s) Performed: Procedure(s) (LRB):  ARTHROPLASTY, KNEE, TOTAL-[facility] HAMZAH (Right)    Final Anesthesia Type: spinal  Patient location during evaluation: PACU  Patient participation: Yes- Able to Participate  Level of consciousness: awake and alert and oriented  Post-procedure vital signs: reviewed and stable  Pain management: adequate  Airway patency: patent  PONV status at discharge: No PONV  Anesthetic complications: no      Cardiovascular status: blood pressure returned to baseline  Respiratory status: unassisted, spontaneous ventilation and room air  Hydration status: euvolemic  Follow-up not needed.          Vitals Value Taken Time   /59 10/22/2019  1:01 PM   Temp 36.8 °C (98.2 °F) 10/22/2019 12:30 PM   Pulse 80 10/22/2019  1:03 PM   Resp 26 10/22/2019  1:03 PM   SpO2 98 % 10/22/2019  1:03 PM   Vitals shown include unvalidated device data.      No case tracking events are documented in the log.      Pain/Franco Score: Pain Rating Prior to Med Admin: 10 (10/22/2019 11:46 AM)  Franco Score: 9 (10/22/2019 10:30 AM)

## 2019-10-22 NOTE — ANESTHESIA PROCEDURE NOTES
Adductor Canal Catheter    Patient location during procedure: pre-op   Block not for primary anesthetic.  Reason for block: at surgeon's request and post-op pain management   Post-op Pain Location: R knee pain  Start time: 10/22/2019 7:31 AM  Timeout: 10/22/2019 7:28 AM   End time: 10/22/2019 7:50 AM    Staffing  Authorizing Provider: Omega Wang MD  Performing Provider: Omega Wang MD    Preanesthetic Checklist  Completed: patient identified, site marked, surgical consent, pre-op evaluation, timeout performed, IV checked, risks and benefits discussed and monitors and equipment checked  Peripheral Block  Patient position: supine  Prep: ChloraPrep and site prepped and draped  Patient monitoring: heart rate, cardiac monitor, continuous pulse ox, continuous capnometry and frequent blood pressure checks  Block type: adductor canal  Laterality: right  Injection technique: continuous  Needle  Needle type: Tuohy   Needle gauge: 17 G  Needle length: 3.5 in  Needle localization: anatomical landmarks and ultrasound guidance  Catheter type: spring wound  Catheter size: 19 G  Test dose: lidocaine 1.5% with Epi 1-to-200,000 and negative   -ultrasound image captured on disc.  Assessment  Injection assessment: negative aspiration, negative parasthesia and local visualized surrounding nerve  Paresthesia pain: none  Heart rate change: no  Slow fractionated injection: yes  Additional Notes  VSS.  [facility] RN monitoring vitals throughout procedure.  Patient tolerated procedure well.

## 2019-10-22 NOTE — ANESTHESIA PROCEDURE NOTES
Spinal    Diagnosis: arthritis  Patient location during procedure: OR  Start time: 10/22/2019 8:11 AM  Timeout: 10/22/2019 8:09 AM  End time: 10/22/2019 8:12 AM    Staffing  Authorizing Provider: Omega Wang MD  Performing Provider: Omega Wang MD    Preanesthetic Checklist  Completed: patient identified, site marked, surgical consent, pre-op evaluation, timeout performed, IV checked, risks and benefits discussed and monitors and equipment checked  Spinal Block  Patient position: sitting  Prep: ChloraPrep  Patient monitoring: heart rate, cardiac monitor and continuous pulse ox  Approach: midline  Injection technique: single shot  CSF Fluid: clear free-flowing CSF  Needle  Needle type: pencil-tip   Needle gauge: 25 G  Needle length: 3.5 in  Additional Documentation: incremental injection, negative aspiration for heme and no paresthesia on injection  Needle localization: anatomical landmarks  Assessment  Patient's tolerance of the procedure: no complaints and comfortable throughout block

## 2019-10-22 NOTE — PROGRESS NOTES
Mirna Eason is a 64 y.o. year old here today for a pre-operative visit in preparation for a right total knee arthroplasty to be performed by   on 10/22/2019.  she was last seen and treated in the clinic on 10/21/2019. she will be medically optimized by the pre op center. There has been no significant change in medical status since last visit. No fever, chills, malaise, or unexplained weight change.      Allergies, Medications, past medical and surgical history reviewed.    Focused examination performed.    Dr. Jones saw this patient today in clinic. All questions answered. Patient encouraged to call with questions. Contact information given.     Pre, nhung, and post operative procedures and expectations discussed. Questions were answered. Mirna Eason has been educated and is ready to proceed with surgery. Approximately 30 minutes was spent discussing surgical outcomes, plans, procedures pre, nhung, and post operative expections and care.  Surgical consent signed.    Mirna Eason will contact us if there are any questions, concerns, or changes in medical status prior to surgery.

## 2019-10-22 NOTE — PROGRESS NOTES
DR. Wang at bedside. Perineural catheter not flushing well. Unable to start infusion. Dr. Segura will return to replace catheter

## 2019-10-22 NOTE — TRANSFER OF CARE
Anesthesia Transfer of Care Note    Patient: Mirna Eason    Procedure(s) Performed: Procedure(s) (LRB):  ARTHROPLASTY, KNEE, TOTAL-[facility] HAMZAH (Right)    Patient location: PACU    Anesthesia Type: spinal    Transport from OR: Transported from OR on room air with adequate spontaneous ventilation    Post pain: adequate analgesia    Post assessment: no apparent anesthetic complications    Post vital signs: stable    Level of consciousness: awake    Nausea/Vomiting: no nausea/vomiting    Complications: none    Transfer of care protocol was followed      Last vitals:   Visit Vitals  BP (!) 94/55 (BP Location: Right arm, Patient Position: Lying)   Pulse 79   Temp 36.8 °C (98.3 °F) (Oral)   Resp 12   Wt 85.7 kg (189 lb)   SpO2 95%   Breastfeeding? No   BMI 30.51 kg/m²

## 2019-10-22 NOTE — PT/OT/SLP EVAL
Occupational Therapy   Evaluation    Name: Mirna Eason  MRN: [id number]  Admitting Diagnosis:  Primary osteoarthritis of right knee Day of Surgery    Recommendations:     Discharge Recommendations: home  Discharge Equipment Recommendations:  none  Barriers to discharge:  Decreased caregiver support    Assessment:     Mirna Eason is a 64 y.o. female with a medical diagnosis of Primary osteoarthritis of right knee.  She was able to perform supine/sit, sit/stand, bed/chair, and toilet T/F c SBA and RW.  Able to perform UB dressing c max A and LB dressing c set-up.  B UE are WFL.  Pt was able to perform toilet hygiene and grooming c mod I.  Pt is progressing well. Performance deficits affecting function: impaired self care skills, impaired functional mobilty, orthopedic precautions.      Rehab Prognosis: Good; patient would benefit from acute skilled OT services to address these deficits and reach maximum level of function.       Plan:     Patient to be seen daily to address the above listed problems via self-care/home management, therapeutic exercises, therapeutic activities  · Plan of Care Expires: 10/28/19  · Plan of Care Reviewed with: patient, sibling    Subjective     Chief Complaint: Pt is s/p R TKA and is WBAT R LE  Patient/Family Comments/goals: To get better.    Occupational Profile:  Living Environment: Pt lives in a one story house c 1 DIRK, a porch and then another step to enter house.  Pt has a tub/shower combo.  Previous level of function: I PTA  Equipment Used at Home:  raised toilet, walker, rolling  Assistance upon Discharge: Pt lives alone but her sister will stay c her for two weeks after D/C.    Pain/Comfort:  · Pain Rating 1: 6/10    Patients cultural, spiritual, Alevism conflicts given the current situation: no    Objective:     Communicated with: RN prior to session.  Patient found supine with perineural catheter, FCD, peripheral IV upon OT entry to room.    General Precautions: Standard, fall    Orthopedic Precautions:RLE weight bearing as tolerated   Braces: N/A     Occupational Performance:    Bed Mobility:    · Patient completed Supine to Sit with stand by assistance    Functional Mobility/Transfers:  · Patient completed Sit <> Stand Transfer with stand by assistance  with  rolling walker   · Patient completed Bed <> Chair Transfer using Stand Pivot technique with stand by assistance with rolling walker  · Patient completed Toilet Transfer Stand Pivot technique with stand by assistance with  rolling walker  · Functional Mobility: Pt was able to walk to bathroom c SBA and RW.    Activities of Daily Living:  · Upper Body Dressing: maximal assistance to don hospital gown,  · Lower Body Dressing: stand by assistance to don/doff socks.  · Toileting: modified independence for toilet hygiene.    Cognitive/Visual Perceptual:  Cognitive/Psychosocial Skills:     -       Oriented to: Person, Place, Time and Situation   -       Follows Commands/attention:Follows multistep  commands    Physical Exam:  Upper Extremity Range of Motion:     -       Right Upper Extremity: WFL  -       Left Upper Extremity: WFL  Upper Extremity Strength:    -       Right Upper Extremity: WFL  -       Left Upper Extremity: WFL    AMPAC 6 Click ADL:  AMPAC Total Score: 18      Education:    Patient left up in chair with all lines intact, call button in reach, RN notified and sister present    GOALS:   Multidisciplinary Problems     Occupational Therapy Goals        Problem: Occupational Therapy Goal    Goal Priority Disciplines Outcome Interventions   Occupational Therapy Goal     OT, PT/OT     Description:  Goals to be met by: 10/28/19     Patient will increase functional independence with ADLs by performing:    UE Dressing with [place].  LE Dressing with Modified [place] and Assistive Devices as needed.  Grooming while standing at sink with Modified [place].  Toileting from bedside commode with Modified [place] for  hygiene and clothing management.   Bathing from  shower chair/bench with Modified [place].  Toilet transfer to bedside commode with Modified [place].  Increased functional strength to WFL for B UE.  Upper extremity exercise program x15 reps per handout, with independence.                      History:     Past Medical History:   Diagnosis Date    Arthritis     GERD (gastroesophageal reflux disease)     Meniere disease, left        Past Surgical History:   Procedure Laterality Date    appendectomy  1980's    fracture surgery- fell down thestairs- Ankle - 80's      mechanical fall     LAPAROSCOPIC CHOLECYSTECTOMY      oophorectomy, Salpix- Rt - 1980's         Time Tracking:     OT Date of Treatment: 10/22/19  OT Start Time: 1430  OT Stop Time: 1500  OT Total Time (min): 30 min    Billable Minutes:Evaluation 15  Self Care/Home Management 15    ANTHONY Mane  10/22/2019

## 2019-10-22 NOTE — NURSING TRANSFER
Nursing Transfer Note      10/22/2019     Transfer To: 318    Transfer via bed    Transported by PCT and RN     Medicines sent: MIVF @ 150 and PNC @ 8    Chart send with patient: Yes    Notified: SISTER    Patient reassessed at: 10/22 @ 1330 (date, time)    Report given to Mickey MICHAEL

## 2019-10-22 NOTE — OP NOTE
DATE OF PROCEDURE: 10/22/19  PREOPERATIVE DIAGNOSIS: Arthritis, Right knee.   POSTOPERATIVE DIAGNOSIS: Arthritis, Right knee.   PROCEDURES PERFORMED: Right total knee arthroplasty.   SURGEON: Srikanth Jones M.D.   ASSISTANT: WILMER Reynolds MD  ANESTHESIA: Regional.   COMPLICATIONS: None.   COUNTS: Correct.   DISPOSITION: Recovery Room, stable.   SPECIMENS: Bone and cartilage.   FINDINGS: Tricompartmental degenerative change.   FLUIDS: 2000 mL.   BLOOD LOSS: Less than 50 mL.   IMPLANTS: [employer], size 4 Right posterior stabilized   cemented femoral component with a 4 cemented tibial tray, a 31 mm 3-peg   patella and a size 4, 11 mm posterior stabilized polyethylene insert.   INDICATIONS FOR PROCEDURE: Mirna Eason is a 64-year-old female who has   severe arthritis in the Right knee . She is having continued pain in the   Right knee and did not respond to nonoperative measures, decided to undergo   Right knee replacement. She is aware of reasonable treatment options as   well as risks and benefits. {She did not respond to NSAIDS or injections. She received a course or pre-operative physical therapy.  PROCEDURE IN DETAIL: After appropriate consent was obtained, the patient   Was brought in the Operating Room, anesthesia was administered. She received   antibiotic prophylaxis. Cast   padding and tourniquet was applied to the proximal Right thigh. Right  lower extremity was prepped and draped in usual sterile fashion. Limb was   elevated, tourniquet was inflated. The knee was flexed. An incision was   made from the tibial tubercle just proximal to the superior pole of the   patella. It was taken down through the skin and retinacular. A medial   parapatellar arthrotomy was performed followed by a standard medial   release. Patellar fat pad was partially excised. ACL was resected.   Femoral punch was used to make the guide hole for the femoral drill. The   distal cutting guide was set at 6 degrees valgus right. A  standard dstal femoral cut was made.We were pleased with the alignment and quality of the cut.  The PCL retractor was used to bring   the tibia into the field. Meniscal remnants were resected. The   extramedullary tibial guide was pinned in place using the medial side, as most   defective, 2 mm bone was taken off of this. We checked the alignment in   both planes both before and after making the cut. We were satisfied with the   alignment of the cut and the amount of bone removed.The femur was then  sized, found to be a size 4. A size 4 cutting guide was pinned in 3   degrees of external rotation. This was based off the posterior condyle,   checked the transepicondylar axis. Anterior, posterior and chamfer cuts   were made. The box guide was pinned in position. Femoral box cut was   made. Bone fragments were removed. Lamina spreaders were   then used to open up posteriorly. The PCL was resected and some soft   tissue debris was removed.  An 11 mm spacer block gave excellent flexion-extension gap balance.   I was also satisfied with the medial and lateral stability. At this   point, the femoral trial was placed. The tibia was sized, found to be a   Size 4. A size 4 tibial trial was pinned in appropriate alignment and   rotation. This was based on the medial one third of the tibial tubercle.  A stem extension hole was drilled. A keel hole was punched and a   trial reduction was performed with a size 4, 11 mm insert. She  achieved full   extension. There was no recurvatum. She easily had 130 degrees of flexion.   The femoral component ranged symmetrically in the tibial tray. There was   no lift off. There was no instability of full extension, 30 degrees of   flexion, mid flexion and full flexion. Patella was measured and found to   be 24 mm thick, 9 mm of bone removed. The 3-peg holes were drilled 31 mm   3-peg trial was placed. The knee was brought through range of motion. The   patella tracked extremely well.  Therefore, at this point, we were   satisfied with knee range of motion and stability, component position,   sizing and alignment as well as patella tracking. All trial components   were removed. Bone was prepared for cementing by pulsatile lavage and   drying. Components were cemented into place, femur followed by tibia.   Meticulous care was taken to remove excess cement. Tibial insert was   firmly seated in the tibial tray. The knee was inspected. There was no   loose body, foreign body or soft tissue interposition. Knee was then   reduced, brought into extension. Patella button was cemented in place.   Excess cement was removed. The wound was then copiously irrigated with   antibiotic-impregnated solution. Once the cement was dried, tranexamic   acid was applied.Tourniquet was let down. Hemostasis was obtained The arthrotomy was closed with #1 Vicryl. The remainder of the TXA was injected. Once the   arthrotomy was closed, the knee was brought through range of motion, stable   as previously described. Patella tracked very well. Retinacular was   closed with 0 Vicryl, subcutaneous layer with 2-0 Vicryl, skin with   monocryl and dermabond. Sterile dressing was applied.   She was   transferred to a stretcher, brought to Recovery Room in stable condition,   tolerated the procedure well, no known complications.

## 2019-10-22 NOTE — PLAN OF CARE
Patient tolerated PT session well today. She ambulated 90ft with RW and CGA. No LOB or SOB noted. She is okay to ambulate with nursing staff assistance and RW.      Problem: Physical Therapy Goal  Goal: Physical Therapy Goal  Description  Goals to be met by: 10/29/19    Patient will increase functional independence with mobility by performin. Supine to sit with supervision  2. Sit to supine with supervision  3. Sit to stand transfer with Supervision  4. Gait x200 feet with Supervision using Rolling Walker  5. Ascend/Descend 2 steps with left handrail and contact guard assistance  6. Lower extremity exercise program x30 reps per handout, with supervision       Outcome: Ongoing, Progressing

## 2019-10-23 ENCOUNTER — PATIENT OUTREACH (OUTPATIENT)
Dept: ORTHOPEDICS | Facility: CLINIC | Age: 64
End: 2019-10-23

## 2019-10-23 VITALS
RESPIRATION RATE: 18 BRPM | SYSTOLIC BLOOD PRESSURE: 102 MMHG | HEART RATE: 79 BPM | WEIGHT: 189 LBS | HEIGHT: 66 IN | TEMPERATURE: 96 F | DIASTOLIC BLOOD PRESSURE: 62 MMHG | BODY MASS INDEX: 30.37 KG/M2 | OXYGEN SATURATION: 97 %

## 2019-10-23 PROCEDURE — 97530 THERAPEUTIC ACTIVITIES: CPT

## 2019-10-23 PROCEDURE — 25000003 PHARM REV CODE 250: Performed by: ORTHOPAEDIC SURGERY

## 2019-10-23 PROCEDURE — 97116 GAIT TRAINING THERAPY: CPT

## 2019-10-23 PROCEDURE — 63600175 PHARM REV CODE 636 W HCPCS: Performed by: PHYSICIAN ASSISTANT

## 2019-10-23 PROCEDURE — 97535 SELF CARE MNGMENT TRAINING: CPT

## 2019-10-23 PROCEDURE — 97110 THERAPEUTIC EXERCISES: CPT

## 2019-10-23 PROCEDURE — 25000003 PHARM REV CODE 250: Performed by: PHYSICIAN ASSISTANT

## 2019-10-23 PROCEDURE — 99232 SBSQ HOSP IP/OBS MODERATE 35: CPT | Mod: COE,,, | Performed by: ANESTHESIOLOGY

## 2019-10-23 PROCEDURE — 25000003 PHARM REV CODE 250: Performed by: ANESTHESIOLOGY

## 2019-10-23 PROCEDURE — 99232 PR SUBSEQUENT HOSPITAL CARE,LEVL II: ICD-10-PCS | Mod: COE,,, | Performed by: ANESTHESIOLOGY

## 2019-10-23 RX ADMIN — ACETAMINOPHEN 1000 MG: 500 TABLET ORAL at 01:10

## 2019-10-23 RX ADMIN — OXYCODONE HYDROCHLORIDE 5 MG: 5 TABLET ORAL at 05:10

## 2019-10-23 RX ADMIN — APIXABAN 2.5 MG: 2.5 TABLET, FILM COATED ORAL at 01:10

## 2019-10-23 RX ADMIN — OXYCODONE HYDROCHLORIDE 10 MG: 10 TABLET ORAL at 09:10

## 2019-10-23 RX ADMIN — TRIAMTERENE AND HYDROCHLOROTHIAZIDE 1 CAPSULE: 25; 37.5 CAPSULE ORAL at 08:10

## 2019-10-23 RX ADMIN — SENNOSIDES, DOCUSATE SODIUM 1 TABLET: 50; 8.6 TABLET, FILM COATED ORAL at 08:10

## 2019-10-23 RX ADMIN — CEFAZOLIN 2 G: 1 INJECTION, POWDER, FOR SOLUTION INTRAMUSCULAR; INTRAVENOUS at 12:10

## 2019-10-23 RX ADMIN — MUPIROCIN 1 G: 20 OINTMENT TOPICAL at 08:10

## 2019-10-23 RX ADMIN — ACETAMINOPHEN 1000 MG: 500 TABLET ORAL at 05:10

## 2019-10-23 RX ADMIN — OXYCODONE HYDROCHLORIDE 10 MG: 10 TABLET ORAL at 01:10

## 2019-10-23 RX ADMIN — CELECOXIB 200 MG: 100 CAPSULE ORAL at 08:10

## 2019-10-23 RX ADMIN — FAMOTIDINE 20 MG: 20 TABLET ORAL at 08:10

## 2019-10-23 RX ADMIN — POLYETHYLENE GLYCOL 3350 17 G: 17 POWDER, FOR SOLUTION ORAL at 08:10

## 2019-10-23 NOTE — ASSESSMENT & PLAN NOTE
Mirna Eason is a 64 y.o. female s/p R TKA  - Antibiotics: post-op Ancef  - Weight bearing status: wbat  - Labs: reviewed  - DVT Prophylaxis: mechanical, eliquis  - Lines/Drains: PIV, PNC  - Pain control: multimodal per anesthesia    Dispo: home with outpatient PT

## 2019-10-23 NOTE — PT/OT/SLP PROGRESS
Physical Therapy Treatment and Discharge    Patient Name:  Mirna Eason   MRN:  [id number]    Recommendations:     Discharge Recommendations:  outpatient PT   Discharge Equipment Recommendations: none   Barriers to discharge: patient is going to the [facility] till Monday when she flys home     Assessment:     Mirna Eason is a 64 y.o. female admitted with a medical diagnosis of Status post total right knee replacement.  She presents with the following impairments/functional limitations:  weakness, impaired functional mobilty, gait instability, impaired balance, decreased lower extremity function, decreased ROM, impaired joint extensibility, pain, impaired skin, orthopedic precautions.    Patient tolerated PT session fairly well. She was limited by right knee pain. She ambulated 100ft with RW and SBA. She performed R LE therex x10 reps. She is ready to discharge from PT standpoint.     Rehab Prognosis: Good; patient would benefit from acute skilled PT services to address these deficits and reach maximum level of function.    Recent Surgery: Procedure(s) (LRB):  ARTHROPLASTY, KNEE, TOTAL-[facility] (Right) 1 Day Post-Op    Plan:     During this hospitalization, patient to be seen daily to address the identified rehab impairments via gait training, therapeutic activities, therapeutic exercises and progress toward the following goals:    · Plan of Care Expires:  10/29/19    Subjective     Chief Complaint: Pain in right knee.   Patient/Family Comments/goals: To walk without pain.   Pain/Comfort:  · Pain Rating 1: 8/10  · Location - Side 1: Right  · Location 1: knee  · Pain Addressed 1: Pre-medicate for activity, Distraction, Cessation of Activity, Nurse notified (PNC bolused and pain pill given at beginning of session )      Objective:     Communicated with RN prior to session.  Patient found up in chair with cryotherapy, perineural catheter upon PT entry to room.     General Precautions: Standard, fall   Orthopedic  Precautions:RLE weight bearing as tolerated   Braces: N/A     Functional Mobility:  · Bed Mobility:     · Sit to Supine: stand by assistance  · Mat Mobility:    · Supine to Sit: stand by assistance  · Sit to Supine: stand by assistance  · Transfers:     · Sit to Stand:  stand by assistance with rolling walker with verbal cues for hand placement  · Chair to mat: stand by assistance with rolling walker   · Mat to chair: stand by assistance with rolling walker  · Chair to bed: stand by assistance with rolling walker  · Gait: She ambulated 100ft with RW and SBA. No LOB or SOB noted.       AM-PAC 6 CLICK MOBILITY  Turning over in bed (including adjusting bedclothes, sheets and blankets)?: 4  Sitting down on and standing up from a chair with arms (e.g., wheelchair, bedside commode, etc.): 4  Moving from lying on back to sitting on the side of the bed?: 4  Moving to and from a bed to a chair (including a wheelchair)?: 4  Need to walk in hospital room?: 4  Climbing 3-5 steps with a railing?: 3  Basic Mobility Total Score: 23       Therapeutic Activities and Exercises:  Patient educated in and performed R LE exercises x10 reps for ankle pumps, quad set, glute set, SAQ over bolster, heel slides, hip abd/add, SLR, and LAQ.     Patient educated in:  -PT role and POC  -safety with transfers including hand placement  -gait sequencing and RW management  -OOB activity to maximize recovery including ambulating every hour to hour and a half at home   -car transfer  -HEP for therex at home with handout provided    Patient left supine with all lines intact, call button in reach, RN notified and sister present.    GOALS:   Multidisciplinary Problems     Physical Therapy Goals     Not on file          Multidisciplinary Problems (Resolved)        Problem: Physical Therapy Goal    Goal Priority Disciplines Outcome Goal Variances Interventions   Physical Therapy Goal   (Resolved)     PT, PT/OT Met     Description:  Goals to be met by:  10/29/19    Patient will increase functional independence with mobility by performin. Supine to sit with supervision  2. Sit to supine with supervision  3. Sit to stand transfer with Supervision  4. Gait x200 feet with Supervision using Rolling Walker  5. Ascend/Descend 2 steps with left handrail and contact guard assistance  6. Lower extremity exercise program x30 reps per handout, with supervision                        Time Tracking:     PT Received On: 10/23/19  PT Start Time: 943     PT Stop Time: 1022  PT Total Time (min): 39 min     Billable Minutes: Gait Training 23 and Therapeutic Exercise 15    Treatment Type: Treatment  PT/PTA: PT       Carlee Dill, PT  10/23/2019

## 2019-10-23 NOTE — NURSING
IV removed. On Q pump swapped. Teaching complete. Discharge instructions, follow up given to and explained to patient. All questions acknowledged. Friend at bedside. Patient discharged via [facility] transport to [facility]. Safety ensured.

## 2019-10-23 NOTE — PLAN OF CARE
Plan of care reviewed with patient; verbalized understanding.   Medications reviewed and administered as ordered.  Rounding for safety and patient care per policy.   Safety precautions maintained. Knee precautions maintained.  Call light within reach, bed wheels locked, bed in lowest position, side rails ^x2, safety maintained. NADN, Will continue monitor.

## 2019-10-23 NOTE — PROGRESS NOTES
Acute Pain Service and Perioperative Surgical Home Progress Note    HPI  Mirna Eason is a 64 y.o., female, with GERD, HTN and anemia. POD1 from a right knee replacement.     Interval history    Surgery:  Procedure(s) (LRB):  ARTHROPLASTY, KNEE, TOTAL-[facility] (Right)    Post Op Day #: 1    Catheter type: Perineural Adductor Canal    Infusion type: Ropivacaine 0.2%  8 ml/hr basal    Problem List:    Active Hospital Problems    Diagnosis  POA    *Status post total right knee replacement 10/22/2019 [Z96.651]  Not Applicable    Primary osteoarthritis of right knee [M17.11]  Yes      Resolved Hospital Problems   No resolved problems to display.       Subjective:     General appearance of alert, oriented, no complaints   Pain with rest: 0    Numbers   Pain with movement: 8    Numbers - controlled with PRN medication   Side Effects    1. Pruritis No    2. Nausea No    3. Motor Blockade No, 0=Ability to raise lower extremities off bed    4. Sedation No, 1=awake and alert    Schedule Medications:    acetaminophen  1,000 mg Oral Q6H    apixaban  2.5 mg Oral BID    celecoxib  200 mg Oral Daily    famotidine  20 mg Oral BID    mupirocin  1 g Nasal BID    polyethylene glycol  17 g Oral Daily    pregabalin  150 mg Oral QHS    senna-docusate 8.6-50 mg  1 tablet Oral BID    triamterene-hydrochlorothiazide 37.5-25 mg  1 capsule Oral Daily        Continuous Infusions:   sodium chloride 0.9% Stopped (10/23/19 0523)    ropivacaine (PF) 2 mg/ml (0.2%) 8 mL/hr (10/22/19 6240)    ropivacaine          PRN Medications:  bisacodyl, morphine, naloxone, ondansetron, oxyCODONE, oxyCODONE, promethazine (PHENERGAN) IVPB, ramelteon, ropivacaine       Antibiotics:  Antibiotics (From admission, onward)    Start     Stop Route Frequency Ordered    10/22/19 2100  mupirocin 2 % ointment 1 g      10/27 2059 Nasl 2 times daily 10/22/19 1444         Objective:    Vital Signs (Most Recent):  Temp: 96.3 °F (35.7 °C) (10/23/19 3643)  Pulse:  72 (10/23/19 0538)  Resp: 16 (10/23/19 0538)  BP: (!) 106/56 (10/23/19 0538)  SpO2: 98 % (10/23/19 0538) Vital Signs Range (Last 24H):  Temp:  [96.3 °F (35.7 °C)-98.3 °F (36.8 °C)]   Pulse:  [65-83]   Resp:  [14-25]   BP: ()/(49-71)   SpO2:  [94 %-100 %]      I & O (Last 24H):    Intake/Output Summary (Last 24 hours) at 10/23/2019 0603  Last data filed at 10/22/2019 1002  Gross per 24 hour   Intake 2000 ml   Output --   Net 2000 ml     Physical Exam:    GA: Alert, comfortable, no acute distress.   Pulmonary: Clear to auscultation A/P/L. No wheezing, crackles, or rhonchi.  Cardiac: RRR S1 & S2 w/o rubs/murmurs/gallops.   Abdominal:Bowel sounds present. No tenderness to palpation or distension. No appreciable hepatosplenomegaly.   Skin: No jaundice, rashes, or visible lesions.  Adductor catheter: Catheter site clean, dry, intact     Laboratory:  CBC:   Recent Labs     10/21/19  1022   WBC 6.64   RBC 3.32*   HGB 11.0*   HCT 34.3*      *   MCH 33.1*   MCHC 32.1       BMP:   Recent Labs     10/21/19  1022      K 3.6   CO2 27      BUN 13   CREATININE 0.9   GLU 94   CALCIUM 9.4     No results for input(s): PT, INR, PROTIME, APTT in the last 72 hours.    Anticoagulant dose apixaban 2.5mg at ordered to be administered this am.    Assessment:  Mirna Eason is a 64 y.o., female, with GERD, HTN and anemia. POD1 from a right knee replacement. NO acute events overnight.     Pain control adequate. Pt slept well.     Plan:     1) Pain:    Adductor canal perineural catheter in place and infusing. Good level. Multimodal pain regimen with acetaminophen, Celebrex, Lyrica, and prn oxycodone given  Will continue to monitor. Plan to discharge with On-Q on POD #1.   2) HTN- well controlled- continue home medications   3) GERD- on famotidine and is well controlled- continue home medication   4) FEN/GI: Tolerating liquids, advance diet as tolerated. + flatus. - BM.   5) Dispo: Pt working well with PT/OT. Case  management and SW for placement. Plan for discharge POD #1.        Evaluator MARIELOS Orellana

## 2019-10-23 NOTE — PLAN OF CARE
Patient tolerated PT session fairly well. She was limited by right knee pain. She ambulated 100ft with RW and SBA. She performed R LE therex x10 reps. She is ready to discharge from PT standpoint.       Problem: Physical Therapy Goal  Goal: Physical Therapy Goal  Description  Goals to be met by: 10/29/19    Patient will increase functional independence with mobility by performin. Supine to sit with supervision  2. Sit to supine with supervision  3. Sit to stand transfer with Supervision  4. Gait x200 feet with Supervision using Rolling Walker  5. Ascend/Descend 2 steps with left handrail and contact guard assistance  6. Lower extremity exercise program x30 reps per handout, with supervision       10/23/2019 1335 by Carlee Dill, PT  Outcome: Met

## 2019-10-23 NOTE — PT/OT/SLP PROGRESS
Occupational Therapy   Treatment/Discharge Note    Name: Mirna Eason  MRN: [id number]  Admitting Diagnosis:  Status post total right knee replacement  1 Day Post-Op    Recommendations:     Discharge Recommendations: home  Discharge Equipment Recommendations:  none  Barriers to discharge:  Decreased caregiver support    Assessment:     Mirna Eason is a 64 y.o. female with a medical diagnosis of Status post total right knee replacement.  She was able to perform supine/sit, sit/stand, and bed/chair T/F c SBA and RW.  Able to perform UB/LB dressing, grooming, and toilet hygiene c mod I.  Able to walk to bathroom c mod I and RW.  Pt is progressing well.  Performance deficits affecting function are impaired self care skills, impaired functional mobilty, orthopedic precautions.     Rehab Prognosis:  Good; patient would benefit from acute skilled OT services to address these deficits and reach maximum level of function.       Plan:     Patient to be seen daily to address the above listed problems via self-care/home management, therapeutic activities, therapeutic exercises  · Plan of Care Expires: 10/28/19  · Plan of Care Reviewed with: patient, sibling    Subjective     Pain/Comfort:  · Pain Rating 1: 0/10    Objective:     Communicated with: RN prior to session.  Patient found supine with cryotherapy, FCD upon OT entry to room.    General Precautions: Standard, fall   Orthopedic Precautions:RLE weight bearing as tolerated   Braces: N/A     Occupational Performance:     Bed Mobility:    · Patient completed Supine to Sit with stand by assistance     Functional Mobility/Transfers:  · Patient completed Sit <> Stand Transfer with stand by assistance  with  rolling walker   · Patient completed Bed <> Chair Transfer using Stand Pivot technique with stand by assistance with rolling walker  · Patient completed Toilet Transfer Stand Pivot technique with stand by assistance with  rolling walker and bedside commode  · Functional  Mobility: Pt was able to walk to bathroom c SBA and RW.    Activities of Daily Living:  · Grooming: modified independence to brush teeth and hair while standing at sink.  · Upper Body Dressing: modified independence to don pullover dress.  · Lower Body Dressing: modified independence to don underwear, doff socks, and don shoes.  · Toileting: modified independence for toilet hygiene.      [facility] 6 Click ADL: 24    Treatment & Education:  Educated pt on bathing and car T/F's.    Patient left up in chair with all lines intact, call button in reach, RN notified and family presentEducation:      GOALS:   Multidisciplinary Problems     Occupational Therapy Goals     Not on file          Multidisciplinary Problems (Resolved)        Problem: Occupational Therapy Goal    Goal Priority Disciplines Outcome Interventions   Occupational Therapy Goal   (Resolved)     OT, PT/OT Met    Description:  Goals to be met by: 10/28/19     Patient will increase functional independence with ADLs by performing:    UE Dressing with [place].  LE Dressing with Modified [place] and Assistive Devices as needed.  Grooming while standing at sink with Modified [place].  Toileting from bedside commode with Modified [place] for hygiene and clothing management.   Bathing from  shower chair/bench with Modified [place].  Toilet transfer to bedside commode with Modified [place].  Increased functional strength to WFL for B UE.  Upper extremity exercise program x15 reps per handout, with independence.                      Time Tracking:     OT Date of Treatment: 10/23/19  OT Start Time: 0830  OT Stop Time: 0900  OT Total Time (min): 30 min    Billable Minutes:Self Care/Home Management 15  Therapeutic Activity 15    ANTHONY Mane  10/23/2019

## 2019-10-23 NOTE — ADDENDUM NOTE
Addendum  created 10/23/19 1026 by Alok Toscano MD    Charge Capture section accepted, Cosign clinical note with attestation

## 2019-10-23 NOTE — DISCHARGE SUMMARY
[facility]  Orthopedics  Discharge Summary      Patient Name: Mirna Eason  MRN: [id number]  Admission Date: 10/22/2019  Hospital Length of Stay: 1 days  Discharge Date and Time:  10/23/2019 8:08 AM  Attending Physician: Srikanth Jones MD   Discharging Provider: Carla Reynolds MD  Primary Care Provider: Primary Doctor No    HPI:   Mirna Eason is a 64 y.o. female with right knee osteoarthritis.    Procedure(s) (LRB):  ARTHROPLASTY, KNEE, TOTAL-[facility] (Right)      Hospital Course:  Patient presented for above procedure.  Tolerated it well and was discharged home POD1 after voiding, tolerating diet, ambulating, pain controlled.  Discharge instructions, follow-up appointment, and med rec are below.      Consults (From admission, onward)        Status Ordering Provider     Inpatient consult to Pain Management  Once     Provider:  (Not yet assigned)    Acknowledged GUILLERMINA ROACH     Inpatient consult to Respiratory Care  Once     Provider:  (Not yet assigned)    Acknowledged GUILLERMINA ROACH     Inpatient consult to Social Work  Once     Provider:  (Not yet assigned)    Acknowledged GUILLERMINA ROACH          Significant Diagnostic Studies: No pertinent studies.    Pending Diagnostic Studies:     None        Final Active Diagnoses:    Diagnosis Date Noted POA    PRINCIPAL PROBLEM:  Status post total right knee replacement 10/22/2019 [Z96.651] 10/10/2019 Not Applicable    Primary osteoarthritis of right knee [M17.11] 10/22/2019 Yes    Acid reflux [K21.9] 10/21/2019 Yes      Problems Resolved During this Admission:      Discharged Condition: good    Disposition: Home or Self Care    Follow Up:  Follow-up Information     Guillermina Roach PA-C On 10/28/2019.    Specialty:  Orthopedic Surgery  Why:  For wound re-check  Contact information:  [address]  [facility] [address]  [phone]                 Patient Instructions:      Diet Adult Regular     Keep surgical  extremity elevated     Ice to affected area     Call MD for:  temperature >100.4     Call MD for:  persistent nausea and vomiting or diarrhea     Call MD for:  severe uncontrolled pain     Call MD for:  redness, tenderness, or signs of infection (pain, swelling, redness, odor or green/yellow discharge around incision site)     Call MD for:  difficulty breathing or increased cough     Call MD for:  severe persistent headache     Call MD for:  worsening rash     Call MD for:  persistent dizziness, light-headedness, or visual disturbances     Call MD for:  increased confusion or weakness     Leave dressing on - Keep it clean, dry, and intact until clinic visit   Order Comments: Do not remove surgical dressing for 2 weeks post-op. This will be done only by MD at initial post-op visit. If dressing is completely saturated, replace with identical dressing - silver-impregnated hydrocolloid dressing.     Do not get dressings wet. Do not shower.     If dressing continues to be saturated or there are signs of infection, please call [facility] [phone] for further instructions and to make appt to be seen.     Activity as tolerated     Call MD for:  difficulty breathing, headache or visual disturbances     Call MD for:  extreme fatigue     Call MD for:  hives     Call MD for:  persistent dizziness or light-headedness     Call MD for:  persistent nausea and vomiting     Call MD for:  redness, tenderness, or signs of infection (pain, swelling, redness, odor or green/yellow discharge around incision site)     Call MD for:  severe uncontrolled pain     Call MD for:  temperature >100.4     Keep surgical extremity elevated     Leave dressing on - Keep it clean, dry, and intact until clinic visit   Order Comments: Do not remove surgical dressing for 2 weeks post-op. This will be done only by MD at initial post-op visit. If dressing is completely saturated, replace with identical dressing - silver-impregnated hydrocolloid  dressing.     Do not get dressings wet. Do not shower.     If dressing continues to be saturated or there are signs of infection, please call [facility] [phone] for further instructions and to make appt to be seen.     Lifting restrictions   Order Comments: No strenuous exercise or lifting of > 10 lbs     No driving, operating heavy equipment or signing legal documents while taking pain medication     Sponge bath only until clinic visit     Weight bearing as tolerated     Activity as tolerated     Medications:  Reconciled Home Medications:      Medication List      CONTINUE taking these medications    acetaminophen 650 MG Tbsr  Commonly known as:  TYLENOL  Take 1 tablet (650 mg total) by mouth every 8 (eight) hours as needed.     apixaban 2.5 mg Tab  Commonly known as:  ELIQUIS  Take 1 tablet (2.5 mg total) by mouth 2 (two) times daily.     oxyCODONE 5 MG immediate release tablet  Commonly known as:  ROXICODONE  Take 1-2 tabs by mouth every 4-6 hours as needed for pain     STOOL SOFTENER 100 MG capsule  Generic drug:  docusate sodium  Take 1 capsule (100 mg total) by mouth 2 (two) times daily as needed for Constipation.     triamterene-hydrochlorothiazide 37.5-25 mg 37.5-25 mg per capsule  Commonly known as:  DYAZIDE  Take 1 capsule by mouth once daily.     TUMS ORAL  Take by mouth as needed (acid reflux).        STOP taking these medications    ALEVE 220 mg Cap  Generic drug:  naproxen sodium            Carla Reynolds MD  Orthopedics  [facility]

## 2019-10-23 NOTE — SUBJECTIVE & OBJECTIVE
"Principal Problem:Status post total right knee replacement    Principal Orthopedic Problem: same    Interval History: AFVSS. Pain controlled. Walked 90 ft with PT yesterday.     Review of patient's allergies indicates:  No Known Allergies    Current Facility-Administered Medications   Medication    0.9%  NaCl infusion    acetaminophen tablet 1,000 mg    apixaban tablet 2.5 mg    bisacodyl suppository 10 mg    celecoxib capsule 200 mg    famotidine tablet 20 mg    morphine injection 2 mg    mupirocin 2 % ointment 1 g    naloxone 0.4 mg/mL injection 0.02 mg    ondansetron injection 4 mg    oxyCODONE immediate release tablet 10 mg    oxyCODONE immediate release tablet 5 mg    polyethylene glycol packet 17 g    pregabalin capsule 150 mg    promethazine (PHENERGAN) 6.25 mg in dextrose 5 % 50 mL IVPB    ramelteon tablet 8 mg    ropivacaine (PF) 2 mg/ml (0.2%) infusion    ropivacaine 0.2% ON-Q C-BLOC 400 ML (SELECT A FLOW)    senna-docusate 8.6-50 mg per tablet 1 tablet    triamterene-hydrochlorothiazide 37.5-25 mg per capsule 1 capsule     Objective:     Vital Signs (Most Recent):  Temp: 96.3 °F (35.7 °C) (10/23/19 0445)  Pulse: 72 (10/23/19 0538)  Resp: 16 (10/23/19 0538)  BP: (!) 106/56 (10/23/19 0538)  SpO2: 98 % (10/23/19 0538) Vital Signs (24h Range):  Temp:  [96.3 °F (35.7 °C)-98.2 °F (36.8 °C)] 96.3 °F (35.7 °C)  Pulse:  [65-83] 72  Resp:  [14-25] 16  SpO2:  [94 %-99 %] 98 %  BP: ()/(49-64) 106/56     Weight: 85.7 kg (189 lb)  Height: 5' 6" (167.6 cm)  Body mass index is 30.51 kg/m².      Intake/Output Summary (Last 24 hours) at 10/23/2019 3574  Last data filed at 10/22/2019 1002  Gross per 24 hour   Intake 2000 ml   Output --   Net 2000 ml       Ortho/SPM Exam   HEENT: normocephalic, atraumatic  Resp: no increased work of breathing  CV: regular rate and rhythm  MSK: moves b/l upper extremities well    right lower extremity:  Aquacel c/d/i  No ecchymoses, erythema, or swelling  Sensation " intact SP/DP/T/Sural/Saphenous nerves  Motor intact EHL/FHL/TA/gastroc  Palpable DP/PT pulses      Significant Labs: None    Significant Imaging: I have reviewed all pertinent imaging results/findings.

## 2019-10-23 NOTE — PROGRESS NOTES
"[facility]  Orthopedics  Progress Note    Patient Name: Mirna Eason  MRN: [id number]  Admission Date: 10/22/2019  Hospital Length of Stay: 1 days  Attending Provider: Srikanth Jones MD  Primary Care Provider: Primary Doctor No  Follow-up For: Procedure(s) (LRB):  ARTHROPLASTY, KNEE, TOTAL-[facility] (Right)    Post-Operative Day: 1 Day Post-Op  Subjective:     Principal Problem:Status post total right knee replacement    Principal Orthopedic Problem: same    Interval History: AFVSS. Pain controlled. Walked 90 ft with PT yesterday.     Review of patient's allergies indicates:  No Known Allergies    Current Facility-Administered Medications   Medication    0.9%  NaCl infusion    acetaminophen tablet 1,000 mg    apixaban tablet 2.5 mg    bisacodyl suppository 10 mg    celecoxib capsule 200 mg    famotidine tablet 20 mg    morphine injection 2 mg    mupirocin 2 % ointment 1 g    naloxone 0.4 mg/mL injection 0.02 mg    ondansetron injection 4 mg    oxyCODONE immediate release tablet 10 mg    oxyCODONE immediate release tablet 5 mg    polyethylene glycol packet 17 g    pregabalin capsule 150 mg    promethazine (PHENERGAN) 6.25 mg in dextrose 5 % 50 mL IVPB    ramelteon tablet 8 mg    ropivacaine (PF) 2 mg/ml (0.2%) infusion    ropivacaine 0.2% ON-Q C-BLOC 400 ML (SELECT A FLOW)    senna-docusate 8.6-50 mg per tablet 1 tablet    triamterene-hydrochlorothiazide 37.5-25 mg per capsule 1 capsule     Objective:     Vital Signs (Most Recent):  Temp: 96.3 °F (35.7 °C) (10/23/19 0445)  Pulse: 72 (10/23/19 0538)  Resp: 16 (10/23/19 0538)  BP: (!) 106/56 (10/23/19 0538)  SpO2: 98 % (10/23/19 0538) Vital Signs (24h Range):  Temp:  [96.3 °F (35.7 °C)-98.2 °F (36.8 °C)] 96.3 °F (35.7 °C)  Pulse:  [65-83] 72  Resp:  [14-25] 16  SpO2:  [94 %-99 %] 98 %  BP: ()/(49-64) 106/56     Weight: 85.7 kg (189 lb)  Height: 5' 6" (167.6 cm)  Body mass index is 30.51 kg/m².      Intake/Output " Summary (Last 24 hours) at 10/23/2019 0754  Last data filed at 10/22/2019 1002  Gross per 24 hour   Intake 2000 ml   Output --   Net 2000 ml       Ortho/SPM Exam   HEENT: normocephalic, atraumatic  Resp: no increased work of breathing  CV: regular rate and rhythm  MSK: moves b/l upper extremities well    right lower extremity:  Aquacel c/d/i  No ecchymoses, erythema, or swelling  Sensation intact SP/DP/T/Sural/Saphenous nerves  Motor intact EHL/FHL/TA/gastroc  Palpable DP/PT pulses      Significant Labs: None    Significant Imaging: I have reviewed all pertinent imaging results/findings.    Assessment/Plan:     * Status post total right knee replacement 10/22/2019  Mirna Eason is a 64 y.o. female s/p R TKA  - Antibiotics: post-op Ancef  - Weight bearing status: wbat  - Labs: reviewed  - DVT Prophylaxis: mechanical, eliquis  - Lines/Drains: PIV, PNC  - Pain control: multimodal per anesthesia    Dispo: home with outpatient PT      Acid reflux  GI ppx          Carla Reynolds MD  Orthopedics  [facility]

## 2019-10-23 NOTE — PLAN OF CARE
Problem: Occupational Therapy Goal  Goal: Occupational Therapy Goal  Description  Goals to be met by: 10/28/19     Patient will increase functional independence with ADLs by performing:    UE Dressing with [place].  LE Dressing with Modified [place] and Assistive Devices as needed.  Grooming while standing at sink with Modified [place].  Toileting from bedside commode with Modified [place] for hygiene and clothing management.   Bathing from  shower chair/bench with Modified [place].  Toilet transfer to bedside commode with Modified [place].  Increased functional strength to WFL for B UE.  Upper extremity exercise program x15 reps per handout, with independence.     Outcome: Met

## 2019-10-24 ENCOUNTER — CLINICAL SUPPORT (OUTPATIENT)
Dept: REHABILITATION | Facility: HOSPITAL | Age: 64
End: 2019-10-24
Payer: COMMERCIAL

## 2019-10-24 ENCOUNTER — TELEPHONE (OUTPATIENT)
Dept: ORTHOPEDICS | Facility: CLINIC | Age: 64
End: 2019-10-24

## 2019-10-24 ENCOUNTER — PATIENT OUTREACH (OUTPATIENT)
Dept: ORTHOPEDICS | Facility: CLINIC | Age: 64
End: 2019-10-24

## 2019-10-24 DIAGNOSIS — M17.11 PRIMARY OSTEOARTHRITIS OF RIGHT KNEE: ICD-10-CM

## 2019-10-24 PROCEDURE — 97161 PT EVAL LOW COMPLEX 20 MIN: CPT | Mod: PO

## 2019-10-24 PROCEDURE — 97530 THERAPEUTIC ACTIVITIES: CPT | Mod: PO

## 2019-10-24 NOTE — PROGRESS NOTES
[facility]  Physical Therapy Initial Evaluation    Name: Mirna Eason  Clinic Number: [id number]    Therapy Diagnosis: No diagnosis found.  Physician: Doctor, Outside    Physician Orders: PT Eval and Treat   Medical Diagnosis from Referral: Primary osteoarthritis of right knee  Evaluation Date: 10/24/2019  Authorization Period Expiration: 10/10/2020  Plan of Care Expiration: 12/20/2019  Visit # / Visits authorized: 1/ 1    Time In: 3:00  Time Out: 4:00  Total Billable Time: 60 minutes    Precautions: Standard and Meniere's disease    Subjective   Date of onset: 10/22/2019  History of current condition - Mirna reports: a long history of R knee pain     Medical History:   Past Medical History:   Diagnosis Date    Arthritis     GERD (gastroesophageal reflux disease)     Meniere disease, left        Surgical History:   Mirna Eason  has a past surgical history that includes Laparoscopic cholecystectomy; oophorectomy, Salpix- Rt - 1980's; appendectomy (1980's); fracture surgery- fell down thestairs- Ankle - 80's; and Total knee arthroplasty (Right, 10/22/2019).    Medications:   Mirna has a current medication list which includes the following prescription(s): acetaminophen, apixaban, calcium carbonate, docusate sodium, oxycodone, and triamterene-hydrochlorothiazide 37.5-25 mg.    Allergies:   Review of patient's allergies indicates:  No Known Allergies     Imaging, X-ray    Prior Therapy: yes  Social History: pt lives in a single story home with 2 steps top enter.  Pt lives alone, but will have someone with her for 2 weeks  Occupation: [employer]  Prior Level of Function: Pt was ambulating I  Current Level of Function: Pt in a WC to     Pain:  Current 10/10, worst 10/10, best 3/10   Location: right knee   Description: Aching  Aggravating Factors: movement  Easing Factors: pain medication, ice and rest    Pts goals: return to work     Objective       Observation: Pt entered that  clinic in a WC          Range of Motion:   Knee Left active Left Passive Right Active R passive   Flexion nt nt 38 50   Extension nt nt 10 7           Lower Extremity Strength - not tested  Function:    - SLS R: NT  - SLS L: NT  - Squat: NT   - Sit <--> Stand:CGA   - Bed Mobility: min assist      Edema: moderate      TREATMENT   Treatment Time In: 3:20  Treatment Time Out: 3:45  Total Treatment time separate from Evaluation: 25 minutes    Mirna received therapeutic exercises to develop strength and ROM for 25 minutes including:  AA heel slides  Gluteal sets  Quad sets   Hip internal rotation  Ankle pumps    Home Exercises and Patient Education Provided    Education provided:   - yes    Written Home Exercises Provided: yes.  Exercises were reviewed and Mirna was able to demonstrate them prior to the end of the session.  Mirna demonstrated good  understanding of the education provided.     See EMR under Patient Instructions for exercises provided 10/24/2019.    Assessment   Mirna is a 64 y.o. female referred to outpatient Physical Therapy with a medical diagnosis of Primary osteoarthritis of right knee . Pt presents with a painful, swollen R knee with limited ROM    Pt prognosis is Good.   Pt will benefit from skilled outpatient Physical Therapy to address the deficits stated above and in the chart below, provide pt/family education, and to maximize pt's level of independence.     Plan of care discussed with patient: Yes  Pt's spiritual, cultural and educational needs considered and patient is agreeable to the plan of care and goals as stated below:     Anticipated Barriers for therapy: none    Medical Necessity is demonstrated by the following  History  Co-morbidities and personal factors that may impact the plan of care Co-morbidities:   advanced age and Meniere's disease    Personal Factors:   age     low   Examination  Body Structures and Functions, activity limitations and participation restrictions that may  impact the plan of care Body Regions:   lower extremities    Body Systems:    ROM  strength  balance  gait  transfers    Participation Restrictions:   Walking without an assistive device    Activity limitations:   Learning and applying knowledge  no deficits    General Tasks and Commands  no deficits    Communication  no deficits    Mobility  lifting and carrying objects  walking  using transportation (bus, train, plane, car)  driving (bike, car, motorcycle)    Self care  washing oneself (bathing, drying, washing hands)  toileting  dressing    Domestic Life  shopping  cooking  doing house work (cleaning house, washing dishes, laundry)    Interactions/Relationships  no deficits    Life Areas  no deficits    Community and Social Life  recreation and leisure         moderate   Clinical Presentation stable and uncomplicated low   Decision Making/ Complexity Score: low     Goals:  Short Term Goals: 4 weeks   Pt will be independent in a HEP to address functional deficits  Improve R knee flexion to 90 degrees  Improve R knee extension to 0 degrees  Pt able to ambulate 80 ft with a RW and SBA  Pt independent in bed mobilitly    Long Term Goals: 8 weeks   Pt independent ambulator 125 ft with a RW   Pt Independent in sit to stand, car, toilet and tub transfers  Pt able to drive independently to and from rehab appointments      Plan   Plan of care Certification: 10/24/2019 to 12/20/2019.    Outpatient Physical Therapy 3 times weekly for 1 weeks to include the following interventions: Gait Training, Manual Therapy, Moist Heat/ Ice, Patient Education, Self Care, Therapeutic Activites and Therapeutic Exercise.     Filippo Deutsch, PT

## 2019-10-24 NOTE — TELEPHONE ENCOUNTER
Returned call to patient, she had received a phone call from [facility] but had missed the call, spoke to Rashmi (sister), she stated that the nurse from the hospital phoned to assess the situation from last night.  The nurse, Cassidy, was just following up with them to make sure everything was ok.  Writer informed that a visit will be made tomorrow.  Rashmi verbalized understanding.

## 2019-10-24 NOTE — TELEPHONE ENCOUNTER
Assisted patient from van to room at the [facility], sister, Rashmi, in company, patient was wheeled from Lobby to room via [facility] transportation ([facility]), she was transferred to a recliner chair, surgical site assessed, bandage c/d/i, pain rated 4/10, left pedal pulse 3+, left foot warm & dry, writer encouraged her to continue with the Colace as prescribed by MD, explained that the Colace is not a laxative but a stool softener, will reassess daily.  No needs at this time.

## 2019-10-24 NOTE — TELEPHONE ENCOUNTER
1000 Visited patient in room, sister, Rashmi, at bedside, patient sitting up in recliner, polar ice machine in place, surgical site assessed, bandage c/d/i, pain rated 4/10, right pedal pulse 3+, right foot warm & dry, patient stated she had to call the resident on call last night due to pain, she reported that she was in bed with pillows underneath her leg, writer asked about placement, Rashmi showed writer the surgery book, the patient had 4 pillows under her leg which caused her much pain as seen in the book, the pillows were removed a pain pill taken, the pain improved, she also reported having an accident last night and wanted to know if there were pads for the bed, writer referred them to the pharmacy downstairs, reported taking medications as scheduled, she reports no BM since surgery, writer encouraged her to continue with the Colace as prescribed by MD, explained that the Colace is not a laxative but a stool softener, will reassess daily.  Writer encouraged Mrs. Eason to try and take a nap prior to therapy as she looked exhausted.

## 2019-10-24 NOTE — TELEPHONE ENCOUNTER
Spoke with patient, requested a time to visit in [facility], she stated in about 30 minutes.

## 2019-10-24 NOTE — PROGRESS NOTES
10/24/19  1330    Patient/caretaker called at home. Pain controlled with OnQ but had pain issues that were properly escalated and addressed.  Patient denies signs of local anesthetic toxicity at this time. Dressing clean ,dry, and intact at home. All questions answered and addressed accordingly.  Encouraged to call if any issues arise.

## 2019-10-25 ENCOUNTER — CLINICAL SUPPORT (OUTPATIENT)
Dept: REHABILITATION | Facility: HOSPITAL | Age: 64
End: 2019-10-25
Payer: COMMERCIAL

## 2019-10-25 ENCOUNTER — TELEPHONE (OUTPATIENT)
Dept: ORTHOPEDICS | Facility: CLINIC | Age: 64
End: 2019-10-25

## 2019-10-25 ENCOUNTER — PATIENT OUTREACH (OUTPATIENT)
Dept: ORTHOPEDICS | Facility: CLINIC | Age: 64
End: 2019-10-25

## 2019-10-25 DIAGNOSIS — R29.898 WEAKNESS OF RIGHT LOWER EXTREMITY: ICD-10-CM

## 2019-10-25 PROCEDURE — 97110 THERAPEUTIC EXERCISES: CPT | Mod: PO

## 2019-10-25 NOTE — TELEPHONE ENCOUNTER
Returned Ms. Caraballo's call, she wanted to know when writer would visit as she wanted assistance with discontinuing the Q pump.  When writer phoned, she had already discontinued it.  Writer will visit shortly.

## 2019-10-25 NOTE — NURSING
Patient called at home.     Discussed discontinuation of perineural catheter with patient and caretaker. Pain 3/10, pt states it is starting to let up she just took pain pill.  Patient instructed to take something for pain before removing catheter. Catheter removed and blue tip in tact.  Site clean and dry per patient.  All questions answered.

## 2019-10-25 NOTE — TELEPHONE ENCOUNTER
1133 Visited patient in room, sister, Rashmi, at bedside, patient sitting up on side of the bed, polar ice machine in place, surgical site assessed, bandage c/d/i, pain rated 4/10, right pedal pulse 3+, right foot warm & dry, Q pump removed this morning,  Reported nausea last evening, she had taken Promethazine (from home), had not eaten most of the day yesterday, is afraid she is going to get hooked on pain medication, last pain pill was at 1415 yesterday, writer encouraged her to take the pain medications around the clock as to control her pain and to eat at least some crackers when taking the medications, writer requested a phone call once returned from therapy as to re-assess. Ms. Caraballo verbalized understanding.

## 2019-10-25 NOTE — PROGRESS NOTES
"  Physical Therapy Daily Treatment Note     Name: Mirna Eason  Clinic Number: [id number]    Therapy Diagnosis:   Encounter Diagnosis   Name Primary?    Weakness of right lower extremity      Physician: Doctor, Outside    Visit Date: 10/25/2019    Physician: Doctor, Outside     Physician Orders: PT Eval and Treat   Medical Diagnosis from Referral: Primary osteoarthritis of right knee  Evaluation Date: 10/24/2019  Authorization Period Expiration: 10/10/2020  Plan of Care Expiration: 12/20/2019  Visit # / Visits authorized: 2/3     Time In: 2:30  Time Out: 3:15  Total Billable Time: 45 minutes (TE2)    Precautions: standard and meniere's disease    Subjective     Pt reports: pain R knee, no new complaints since the eval.  She was compliant with home exercise program.  Response to previous treatment: no adverse effects  Functional change: none    Pain: 6/10  Location: right knee      Objective     Mirna received therapeutic exercises to develop strength, ROM and flexibility for 45 minutes including:    AA heel slides with band 2x10  Gluteal sets 3x10 5"  Quad sets  3x10 5"   Hip internal rotation/ hip external rot 3x10  Ankle pumps 3x10  TKE stretch with band assist 2x10 5"    LAQ 2x10  Supine clams 2x10  Passive knee flexion and ext by PT 5 min          Home Exercises Provided and Patient Education Provided     Education provided:   - continuing HEP     Written Home Exercises Provided: yes.  Exercises were reviewed and Mirna was able to demonstrate them prior to the end of the session.  Mirna demonstrated good  understanding of the education provided. (printed by Enrique last visit, given to sister today)      Assessment     Patient presents with good tolerance to therapy today. Painful passive knee flexion by PT but is tolerated. Increased pain with exercises. Good knee extension with TKE stretch and quad sets. Continue to progress as abelardo and d/c to PT in [facility].       Mirna is progressing well towards her goals.   Pt " prognosis is Good.     Pt will continue to benefit from skilled outpatient physical therapy to address the deficits listed in the problem list box on initial evaluation, provide pt/family education and to maximize pt's level of independence in the home and community environment.     Pt's spiritual, cultural and educational needs considered and pt agreeable to plan of care and goals.     Anticipated barriers to physical therapy: none    Goals:     Short Term Goals: 4 weeks   Pt will be independent in a HEP to address functional deficits  Improve R knee flexion to 90 degrees  Improve R knee extension to 0 degrees  Pt able to ambulate 80 ft with a RW and SBA  Pt independent in bed mobilitly     Long Term Goals: 8 weeks   Pt independent ambulator 125 ft with a RW   Pt Independent in sit to stand, car, toilet and tub transfers  Pt able to drive independently to and from rehab appointments    Plan     Continue current PT POC and discharge to continue outpatient PT in [facility].    Nathalie Lopez, PT

## 2019-10-25 NOTE — PLAN OF CARE
[facility]  Physical Therapy Initial Evaluation    Name: Mirna Eason  Clinic Number: [id number]    Therapy Diagnosis: No diagnosis found.  Physician: Doctor, Outside    Physician Orders: PT Eval and Treat   Medical Diagnosis from Referral: Primary osteoarthritis of right knee  Evaluation Date: 10/24/2019  Authorization Period Expiration: 10/10/2020  Plan of Care Expiration: 12/20/2019  Visit # / Visits authorized: 1/ 1    Time In: 3:00  Time Out: 4:00  Total Billable Time: 60 minutes    Precautions: Standard and Meniere's disease    Subjective   Date of onset: 10/22/2019  History of current condition - Mirna reports: a long history of R knee pain     Medical History:   Past Medical History:   Diagnosis Date    Arthritis     GERD (gastroesophageal reflux disease)     Meniere disease, left        Surgical History:   Mirna Eason  has a past surgical history that includes Laparoscopic cholecystectomy; oophorectomy, Salpix- Rt - 1980's; appendectomy (1980's); fracture surgery- fell down thestairs- Ankle - 80's; and Total knee arthroplasty (Right, 10/22/2019).    Medications:   Mirna has a current medication list which includes the following prescription(s): acetaminophen, apixaban, calcium carbonate, docusate sodium, oxycodone, and triamterene-hydrochlorothiazide 37.5-25 mg.    Allergies:   Review of patient's allergies indicates:  No Known Allergies     Imaging, X-ray    Prior Therapy: yes  Social History: pt lives in a single story home with 2 steps top enter.  Pt lives alone, but will have someone with her for 2 weeks  Occupation: [employer] at [employer]  Prior Level of Function: Pt was ambulating I  Current Level of Function: Pt in a WC to     Pain:  Current 10/10, worst 10/10, best 3/10   Location: right knee   Description: Aching  Aggravating Factors: movement  Easing Factors: pain medication, ice and rest    Pts goals: return to work     Objective       Observation: Pt entered that  clinic in a WC          Range of Motion:   Knee Left active Left Passive Right Active R passive   Flexion nt nt 38 50   Extension nt nt 10 7           Lower Extremity Strength - not tested  Function:    - SLS R: NT  - SLS L: NT  - Squat: NT   - Sit <--> Stand:CGA   - Bed Mobility: min assist      Edema: moderate      TREATMENT   Treatment Time In: 3:20  Treatment Time Out: 3:45  Total Treatment time separate from Evaluation: 25 minutes    Mirna received therapeutic exercises to develop strength and ROM for 25 minutes including:  AA heel slides  Gluteal sets  Quad sets   Hip internal rotation  Ankle pumps    Home Exercises and Patient Education Provided    Education provided:   - yes    Written Home Exercises Provided: yes.  Exercises were reviewed and Mirna was able to demonstrate them prior to the end of the session.  Mirna demonstrated good  understanding of the education provided.     See EMR under Patient Instructions for exercises provided 10/24/2019.    Assessment   Mirna is a 64 y.o. female referred to outpatient Physical Therapy with a medical diagnosis of Primary osteoarthritis of right knee . Pt presents with a painful, swollen R knee with limited ROM    Pt prognosis is Good.   Pt will benefit from skilled outpatient Physical Therapy to address the deficits stated above and in the chart below, provide pt/family education, and to maximize pt's level of independence.     Plan of care discussed with patient: Yes  Pt's spiritual, cultural and educational needs considered and patient is agreeable to the plan of care and goals as stated below:     Anticipated Barriers for therapy: none    Medical Necessity is demonstrated by the following  History  Co-morbidities and personal factors that may impact the plan of care Co-morbidities:   advanced age and Meniere's disease    Personal Factors:   age     low   Examination  Body Structures and Functions, activity limitations and participation restrictions that may  impact the plan of care Body Regions:   lower extremities    Body Systems:    ROM  strength  balance  gait  transfers    Participation Restrictions:   Walking without an assistive device    Activity limitations:   Learning and applying knowledge  no deficits    General Tasks and Commands  no deficits    Communication  no deficits    Mobility  lifting and carrying objects  walking  using transportation (bus, train, plane, car)  driving (bike, car, motorcycle)    Self care  washing oneself (bathing, drying, washing hands)  toileting  dressing    Domestic Life  shopping  cooking  doing house work (cleaning house, washing dishes, laundry)    Interactions/Relationships  no deficits    Life Areas  no deficits    Community and Social Life  recreation and leisure         moderate   Clinical Presentation stable and uncomplicated low   Decision Making/ Complexity Score: low     Goals:  Short Term Goals: 4 weeks   Pt will be independent in a HEP to address functional deficits  Improve R knee flexion to 90 degrees  Improve R knee extension to 0 degrees  Pt able to ambulate 80 ft with a RW and SBA  Pt independent in bed mobilitly    Long Term Goals: 8 weeks   Pt independent ambulator 125 ft with a RW   Pt Independent in sit to stand, car, toilet and tub transfers  Pt able to drive independently to and from rehab appointments      Plan   Plan of care Certification: 10/24/2019 to 12/20/2019.    Outpatient Physical Therapy 3 times weekly for 1 weeks to include the following interventions: Gait Training, Manual Therapy, Moist Heat/ Ice, Patient Education, Self Care, Therapeutic Activites and Therapeutic Exercise.     Filippo Deutsch, PT

## 2019-10-28 ENCOUNTER — OFFICE VISIT (OUTPATIENT)
Dept: ORTHOPEDICS | Facility: CLINIC | Age: 64
End: 2019-10-28
Payer: COMMERCIAL

## 2019-10-28 ENCOUNTER — CLINICAL SUPPORT (OUTPATIENT)
Dept: REHABILITATION | Facility: HOSPITAL | Age: 64
End: 2019-10-28
Payer: COMMERCIAL

## 2019-10-28 ENCOUNTER — TELEPHONE (OUTPATIENT)
Dept: ORTHOPEDICS | Facility: CLINIC | Age: 64
End: 2019-10-28

## 2019-10-28 VITALS
HEIGHT: 60 IN | DIASTOLIC BLOOD PRESSURE: 75 MMHG | SYSTOLIC BLOOD PRESSURE: 124 MMHG | TEMPERATURE: 97 F | WEIGHT: 186 LBS | HEART RATE: 86 BPM | BODY MASS INDEX: 36.52 KG/M2

## 2019-10-28 DIAGNOSIS — R29.898 WEAKNESS OF RIGHT LOWER EXTREMITY: ICD-10-CM

## 2019-10-28 DIAGNOSIS — Z96.651 STATUS POST TOTAL RIGHT KNEE REPLACEMENT: Primary | ICD-10-CM

## 2019-10-28 PROCEDURE — 99024 POSTOP FOLLOW-UP VISIT: CPT | Mod: S$GLB,COE,, | Performed by: PHYSICIAN ASSISTANT

## 2019-10-28 PROCEDURE — 99024 PR POST-OP FOLLOW-UP VISIT: ICD-10-PCS | Mod: S$GLB,COE,, | Performed by: PHYSICIAN ASSISTANT

## 2019-10-28 PROCEDURE — 97110 THERAPEUTIC EXERCISES: CPT | Mod: PO

## 2019-10-28 PROCEDURE — 99999 PR PBB SHADOW E&M-EST. PATIENT-LVL III: ICD-10-PCS | Mod: PBBFAC,COE,, | Performed by: PHYSICIAN ASSISTANT

## 2019-10-28 PROCEDURE — 99999 PR PBB SHADOW E&M-EST. PATIENT-LVL III: CPT | Mod: PBBFAC,COE,, | Performed by: PHYSICIAN ASSISTANT

## 2019-10-28 RX ORDER — OXYCODONE HYDROCHLORIDE 5 MG/1
TABLET ORAL
Qty: 30 TABLET | Refills: 0 | Status: SHIPPED | OUTPATIENT
Start: 2019-10-28 | End: 2020-01-21 | Stop reason: ALTCHOICE

## 2019-10-28 NOTE — PROGRESS NOTES
Mirna Eason presents for initial post-operative visit following a right total knee arthroplasty performed by Dr. Jones on 10/22/2019. Going home to [place] today after PT. Doing well. Taking Eliquis 2.5 mg bid.     Exam:   Blood pressure 124/75, pulse 86, temperature 97 °F (36.1 °C), temperature source Oral, height 5' (1.524 m), weight 84.4 kg (186 lb 0.2 oz).   Ambulating well with assistive device.  Incision is clean and dry without drainage or erythema. Passive full extension. AROM 5-85    Initial post-operative radiographs reviewed today revealing a well fixed and aligned prosthesis.    A/P:  6 days s/p right total knee replacement  - The patient was advised to keep the dressing intact and incision clean and dry until 2 weeks post op. She may then remove dressing and shower using soap and water. No antibiotic ointment or other creams to incision.   - Outpatient PT ordered at [facility] to be done 2-3 times weekly.  - Continue Eliquis 2.5 mg bid for 1 month from surgery.  - Pain medication refilled. Pt should take Tylenol 650 mg q 8 hours. Oxycodone q 6-8 if pain not adequately controlled with Tylenol.  - Reviewed antibiotic prophylaxis   - Follow up with PCP Nov 6th. Sutures are all dissolvable.   - Pt may call us with questions or concerns.

## 2019-10-28 NOTE — PROGRESS NOTES
"  Physical Therapy Daily Treatment Note     Name: Mirna Eason  Clinic Number: [id number]    Therapy Diagnosis:   No diagnosis found.  Physician: Doctor, Outside    Visit Date: 10/28/2019    Physician: DoctorFantasma     Physician Orders: PT Eval and Treat   Medical Diagnosis from Referral: Primary osteoarthritis of right knee  Evaluation Date: 10/24/2019  Authorization Period Expiration: 10/10/2020  Plan of Care Expiration: 12/20/2019  Visit # / Visits authorized: 3/3     Time In: 10:00 am   Time Out: 11:00 am  Total Billable Time: 50 minutes (TE3)    Precautions: standard and meniere's disease    Subjective     Pt reports: pain R knee, no new complaints since the eval. Pt reports she has been taking pain medication to control the pain. Pt went to MD today and they changed bandage and pt reports it feels much better.   She was compliant with home exercise program.  Response to previous treatment: no adverse effects  Functional change: none    Pain: 4/10  Location: right knee      Objective     Mirna received therapeutic exercises to develop strength, ROM and flexibility for 50 minutes including:    AA heel slides with band 2x10  Gluteal sets 3x10 5"  Quad sets  3x10 5"   Hip internal rotation/ hip external rot 3x10  Ankle pumps 3x10  TKE stretch with band assist 2x10 5"    LAQ 2x10  Supine clams 3x10  Passive knee flexion and ext by PT 5 min  SAQ 2x10    Gait training on stairs - ascend/descend steps with handrail on L when ascending x2    Cold pack to R knee x10 minutes post exercises    Home Exercises Provided and Patient Education Provided     Education provided:   - continuing HEP     Written Home Exercises Provided: yes.  Exercises were reviewed and Mirna was able to demonstrate them prior to the end of the session.  Mirna demonstrated good  understanding of the education provided. (printed by Enrique last visit, given to sister today)    Assessment     Patient presents with good tolerance to therapy today. " Painful passive knee flexion by PT but is tolerated. Pt denied increased pain with exercises. Pt able to negotiate steps with handrail on L when ascending with supervision; pt has 2 steps to enter home with handrail on L when ascending. Pt's gait with RW improved with verbal cues for proper gait pattern. Pt is set up to start PT in [facility] on Wednesday. Pt discharged from PT, and will resume PT in [facility].       Mirna is progressing well towards her goals.   Pt prognosis is Good.     Pt will continue to benefit from skilled outpatient physical therapy to address the deficits listed in the problem list box on initial evaluation, provide pt/family education and to maximize pt's level of independence in the home and community environment.     Pt's spiritual, cultural and educational needs considered and pt agreeable to plan of care and goals.     Anticipated barriers to physical therapy: none    Goals:     Short Term Goals: 4 weeks   Pt will be independent in a HEP to address functional deficits - MET 12/28/18  Improve R knee flexion to 90 degrees - MET 12/28/19  Improve R knee extension to 0 degrees - MET 12/28/19  Pt able to ambulate 80 ft with a RW and SBA - MET 12/28/19  Pt independent in bed mobilitly - MET 12/28/19     Long Term Goals: 8 weeks   Pt independent ambulator 125 ft with a RW - MET 12/28/19  Pt Independent in sit to stand, car, toilet and tub transfers - MET 12/28/19  Pt able to drive independently to and from rehab appointments - NOT MET 12/28/19    Plan     Discharge to continue outpatient PT in [facility].    Aleksander Villarreal, PT

## 2019-10-28 NOTE — TELEPHONE ENCOUNTER
Spoke to pt regarding above the knee compression stockings. Was unable to get them, tried pharmacy, central supply and Mr. Wheel chair. Advised pt her caregiver can try [facility] down the street. If they are unable to purchase them it is ok as they are not necessary just suggested per Guillermina NEGRON. Pt verbalized understanding.

## 2019-10-28 NOTE — LETTER
October 28, 2019      Outside Doctor  [address] [facility] [id number]           [facility]  [address]  [facility] [address]  Phone: [phone]          Patient: Mirna Eason   MR Number: [id number]   YOB: 1955   Date of Visit: 10/28/2019       Dear Outside Doctor:    Thank you for referring Mirna Eason to me for evaluation. Attached you will find relevant portions of my assessment and plan of care.    If you have questions, please do not hesitate to call me. I look forward to following Mirna Eason along with you.    Sincerely,    Guillermina Roahc PA-C    Enclosure  CC:  No Recipients    If you would like to receive this communication electronically, please contact [email or web address] or [phone] to request more information on [employer] Link access.    For providers and/or their staff who would like to refer a patient to [facility], please contact us through our one-stop-shop provider referral line, Minerva Mazariegos, at [phone].    If you feel you have received this communication in error or would no longer like to receive these types of communications, please e-mail [email or web address]

## 2019-11-05 ENCOUNTER — TELEPHONE (OUTPATIENT)
Dept: ORTHOPEDICS | Facility: CLINIC | Age: 64
End: 2019-11-05

## 2019-11-05 NOTE — TELEPHONE ENCOUNTER
Returned call, spoke with patient, she inquired if she was able to re-start her Triamterane-HCTZ, per her d/c orders she is able to continue that medication.     Patient stated that her physical therapist told her that she is more advanced than most, last night was the first time that she slept w/o the polar ice machine, reports that she is weaning herself off of the pain meds.  She is also considering having a LTKA in the future.

## 2019-11-13 ENCOUNTER — TELEPHONE (OUTPATIENT)
Dept: ORTHOPEDICS | Facility: CLINIC | Age: 64
End: 2019-11-13

## 2019-11-13 NOTE — TELEPHONE ENCOUNTER
Spoke to pt, she states she is feeling a little better, informed her that the x-ray looks good, she may have pulled her hamstring per Dr. Jones. She can rest for a couple days. She states she spoke to her physical therapist which states he will reevaluate her on Friday. She states he said she was advanced and he may have done to much with her to soon. Informed pt to call us after therapy on Friday and earlier if needed. Pt pleased and verbalized understanding.

## 2019-11-13 NOTE — TELEPHONE ENCOUNTER
Spoke to pt, she states she has been doing fine until yesterday, she took a nap and was woken up as she felt a pop above her incision. She states it was extremely painful. The pain now is in the back of her knee, she states she has to lift her leg. She is scheduled to see PCP today at 12. Notified Dr. Jones, he requests image of xray sent via email or phone to review. Notified Pt to have MD send image of xray. Provided number to send. Pt verbalized understanding.

## 2019-11-20 ENCOUNTER — TELEPHONE (OUTPATIENT)
Dept: ORTHOPEDICS | Facility: CLINIC | Age: 64
End: 2019-11-20

## 2019-11-20 NOTE — TELEPHONE ENCOUNTER
Spoke to pt, she states she is doing much better, she just left the gym and was walking, she saw PT on Monday and he told her she is 100% on the bicycle. Informed pt to call for questions/concerns. Pt verbalized understanding.

## 2019-12-02 ENCOUNTER — TELEPHONE (OUTPATIENT)
Dept: ORTHOPEDICS | Facility: CLINIC | Age: 64
End: 2019-12-02

## 2019-12-02 NOTE — TELEPHONE ENCOUNTER
Spoke to pt, states she is doing well, she has stiffness sometimes. She has stopped Eliquis as directed, would like to resume Aleve. Informed pt she can resume Aleve. Pt pleased and verbalized understanding.

## 2020-01-09 ENCOUNTER — PATIENT OUTREACH (OUTPATIENT)
Dept: ORTHOPEDICS | Facility: CLINIC | Age: 65
End: 2020-01-09

## 2020-01-09 NOTE — TELEPHONE ENCOUNTER
Spoke to pt, she states she is ready to have her other knee replaced. Confirmed we have received a referral for her left knee. She would like the surgery date of March 3rd. Informed her I will pencil her in, it is tentative. Will be confirmed once Dr. Jones gives the okay. Will review xray and left knee and forward to Robert to review and give approval. Completed CJR questions for R TKA. Pt pleased and verbalized understanding.

## 2020-01-13 ENCOUNTER — TELEPHONE (OUTPATIENT)
Dept: ORTHOPEDICS | Facility: CLINIC | Age: 65
End: 2020-01-13

## 2020-01-13 NOTE — TELEPHONE ENCOUNTER
"Spoke to patient, discussed findings in her medical record:    - The last time she saw a Dentist was when her son was 3 yrs he is now 47. Writer encouraged her to contact a dentist, schedule a consultation visit, explain her fear, and then return to the Dentist for any procedure as he should be able to prescribe an anti-anxiety medication.    - She last saw her PCP - Dr. Cortes - in December - constipation - he instructed her to purchase stool softeners OTC.    - Stool softener - Rexall 100 mg - takes 1 in the am and 2 in the evening    - Ht & Wt: 5'6" @ 183 lbs    - Surgery tentatively scheduled for March 3, 2020.  "

## 2020-01-20 ENCOUNTER — TELEPHONE (OUTPATIENT)
Dept: ORTHOPEDICS | Facility: CLINIC | Age: 65
End: 2020-01-20

## 2020-01-20 NOTE — TELEPHONE ENCOUNTER
Spoke with patient, she stated that she is attempting to extend her leave with [employer] ([phone]), they need something in writing from [facility] stating that she is scheduled to have another knee surgery before they can approve an extension of leave.  She stated that her Claim # should be included in the document:     Claim #: [id number]    Writer explained that her chart will be reviewed for completeness and a phone call will then be made to [employer] for requirements and to ascertain in they have a form that should be filled out.  Writer informed that when this is complete a call will be returned to her.  Patient verbalized understanding.

## 2020-01-21 ENCOUNTER — TELEPHONE (OUTPATIENT)
Dept: ORTHOPEDICS | Facility: CLINIC | Age: 65
End: 2020-01-21

## 2020-01-21 DIAGNOSIS — M17.12 PRIMARY OSTEOARTHRITIS OF LEFT KNEE: Primary | ICD-10-CM

## 2020-01-21 NOTE — TELEPHONE ENCOUNTER
"Spoke with patient, she verified her medications: no longer taking oxycodone, she is taking Rexall, a stool softener OTC, she saw the dentist "the other day," had a "good cleaning," she returns on 1-31-20 to have 2 teeth extracted.    Writer informed patient that [employer] will be contacted regarding her leave.  Patient verbalized understanding.      "

## 2020-01-21 NOTE — TELEPHONE ENCOUNTER
Spoke with patient, she stated that he spoke with [employer] and they will fax the HAM form in 2-3 days to be completed for her leave extension.

## 2020-01-21 NOTE — TELEPHONE ENCOUNTER
Spoke with patient, informed her that [employer] had been contacted, Stacie stated that the forms had been sent to the associate.  Ms. Bradley stated that she will call writer to obtain the fax number, as she is currently driving,  she will call [employer] and have [employer] send the form directly to [facility].

## 2020-01-21 NOTE — TELEPHONE ENCOUNTER
Spoke with Stacie, with [employer], she stated that the forms were sent to the associate, she also stated that a letter could be written on the hospitals' letterhead or the MD could write something on a prescription pad indicating the projected surgery date.

## 2020-01-22 ENCOUNTER — TELEPHONE (OUTPATIENT)
Dept: ORTHOPEDICS | Facility: CLINIC | Age: 65
End: 2020-01-22

## 2020-01-22 NOTE — TELEPHONE ENCOUNTER
L/M via voicemail. Informed that her surgery has been approved, however, the physician would like her to have repeat labs: CBC and INR, orders faxed to Dr. Cortes (PCP).

## 2020-01-29 ENCOUNTER — TELEPHONE (OUTPATIENT)
Dept: ORTHOPEDICS | Facility: CLINIC | Age: 65
End: 2020-01-29

## 2020-01-29 NOTE — TELEPHONE ENCOUNTER
Spoke to pt, pt accepted 3/17 for surgery date. Dr. Jones will be out on 3/24, pt is okay seeing Alize only as Dr. Jones will not be able see the pt during the visit, due to professional travel. Will submit POC for 3/17. Pt pleased and verbalized understanding.

## 2020-01-29 NOTE — TELEPHONE ENCOUNTER
Spoke to pt, informed 3/3 is longer available due to Dr. Jones being out on 3/2. Offered pt 3/10 or 3/17, pt will discuss with family and call tomorrow morning

## 2020-01-29 NOTE — TELEPHONE ENCOUNTER
Spoke with patient, informed of surgery date of 3-3-20, patient accepted, she [place] [facility] [place], informed of items NOT included in the bundle: DME, Meds, Post op PT, patient reports that sh had DME from prior surgery, patient is active in the portal, however, she states that her phone cannot face time, writer will have AN call to assist her, encouraged to review education on portal, Telemed visit set up for 2-18-20 @ 1000, requested that the name and contact information for a physical therapy facility for post op therapy be provided, patient stated she would research and provide at a later time. Patient stated that her PCP was willing to f/u post-operatively. Writer will confirm. Patient verbalized understanding.     Writer confirmed that she had her labs drawn and will contact provider and have the results faxed.  She also confirmed the extraction of her teeth on 1-31-20.

## 2020-02-06 DIAGNOSIS — M17.12 PRIMARY OSTEOARTHRITIS OF LEFT KNEE: Primary | ICD-10-CM

## 2020-02-18 ENCOUNTER — TELEPHONE (OUTPATIENT)
Dept: INTERNAL MEDICINE | Facility: CLINIC | Age: 65
End: 2020-02-18

## 2020-02-18 ENCOUNTER — TELEPHONE (OUTPATIENT)
Dept: ORTHOPEDICS | Facility: CLINIC | Age: 65
End: 2020-02-18

## 2020-02-18 DIAGNOSIS — H81.02 MENIERE'S DISEASE OF LEFT EAR: ICD-10-CM

## 2020-02-18 DIAGNOSIS — M17.12 PRIMARY OSTEOARTHRITIS OF LEFT KNEE: Primary | ICD-10-CM

## 2020-02-18 DIAGNOSIS — K59.00 CONSTIPATION, UNSPECIFIED CONSTIPATION TYPE: ICD-10-CM

## 2020-02-18 DIAGNOSIS — D75.89 MACROCYTOSIS: ICD-10-CM

## 2020-02-18 DIAGNOSIS — Z86.73 HISTORY OF TIA (TRANSIENT ISCHEMIC ATTACK): ICD-10-CM

## 2020-02-18 DIAGNOSIS — R60.9 EDEMA, UNSPECIFIED TYPE: ICD-10-CM

## 2020-02-18 DIAGNOSIS — Z87.891 HISTORY OF TOBACCO USE: ICD-10-CM

## 2020-02-18 DIAGNOSIS — K21.9 GASTROESOPHAGEAL REFLUX DISEASE, ESOPHAGITIS PRESENCE NOT SPECIFIED: ICD-10-CM

## 2020-02-18 DIAGNOSIS — D64.9 ANEMIA, UNSPECIFIED TYPE: ICD-10-CM

## 2020-02-18 NOTE — TELEPHONE ENCOUNTER
Spoke to pt, pt states Dr. Cortes the same PCP will follow her post-operatively. Provided dates, pt will call to schedule. Pt will call to provide OP PT location, she would like to use a different one. Pt has one more tooth to be pulled on 2/25. Dentist will fax clearance letter after tooth extraction is complete. Pt will bring walker from home and leave commode. Instructed pt to call with questions/concerns. Understanding verbalized.

## 2020-02-18 NOTE — TELEPHONE ENCOUNTER
Spoke to her on the Phone       Schedule for Left knee replacement 3/17/2020   Under [facility] program      Has Moderate  pain in the Left knee -since Dec 2019   Had injury to the Left foot 1994/95- fell down a flight of stairs- foot slipped    No Left knee surgeries        Medication and allergies reviewed        Active cardiac conditions- None -  RCRI-? TIA  MET's- Works for [employer] - on her feet a lot   House work , shopping , [employer] , 1 flight of stairs   > 4 METs-     ROS     No fever -  Weight stable   STOPBANG- occasional snoring - not on a regular basis -- age   No sudden vision changes -  Cough,clear  phlegm-no longer   No Overt GI/ bleeding   No steroid treatment recently   No Dysuria, Hesitancy  No rashes   No syncope , unilateral weakness   No easy bruising - was bruising on Frequent Aleve use        Surgical history      No anesthesia , bleeding , cardiac trouble , PONV with previous surgeries, procedures -     Social      Son Lives with her   Sister going to help post op

## 2020-02-18 NOTE — ASSESSMENT & PLAN NOTE
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

## 2020-02-18 NOTE — ASSESSMENT & PLAN NOTE
On Dyazide for edema  Not known to have HTN  LLE swollen on standing on it for along time   Rt Leg no longer swollen since had knee replacement

## 2020-02-18 NOTE — ASSESSMENT & PLAN NOTE
2003/ 2004   Woke up with Left sided weakness -noticed at 1-2 AM-short lasting - 1 hour  Was taken to  ER  Had Carotid testing -No blocked arteries to her understanding

## 2020-02-18 NOTE — ASSESSMENT & PLAN NOTE
Left side   Takes Triamterene - HCTZ - daily for Meniere's which is also helping with lower extremity edema   No recent problem

## 2020-03-05 ENCOUNTER — TELEPHONE (OUTPATIENT)
Dept: ORTHOPEDICS | Facility: CLINIC | Age: 65
End: 2020-03-05

## 2020-03-05 NOTE — TELEPHONE ENCOUNTER
Spoke with patient, returned her call, patient called on yesterday stating that her dentist wanted a form to be forwarded for them to fill out for the dental clearance.  Writer informed that [facility] does not have a form the dentist should write a letter on their letterhead.      Writer phoned patient this morning for the name and phone number of the dentist, as to forward the contents of the request for clearance.  Contact information obtained and request faxed to Dr. Carolee Ross DDS, [facility], phone [phone], fax [phone].    Patient also, requested a phone number for [facility], provoided, she stated that her sister has decided that they should drive instead of fly due to the Coronavirus.  She will call [facility] to obtain details for a possible change in travel.

## 2020-03-06 ENCOUNTER — TELEPHONE (OUTPATIENT)
Dept: ORTHOPEDICS | Facility: CLINIC | Age: 65
End: 2020-03-06

## 2020-03-06 NOTE — TELEPHONE ENCOUNTER
Barby with the pt's dental office called stating the dentist has cleared the pt, but she has been unable to fax it. Confirmed fax number, barby re-faxed and emailed. She states the dentist signed the paper stating the pt has no infections and does not require any dental work in the next 90 days. Received fax signed by dentist on second page. Put in her file.

## 2020-03-12 ENCOUNTER — TELEPHONE (OUTPATIENT)
Dept: ORTHOPEDICS | Facility: CLINIC | Age: 65
End: 2020-03-12

## 2020-03-12 NOTE — TELEPHONE ENCOUNTER
Spoke with patient, she stated that her sister is refusing to fly due to the corona virus, her kids &  does not want her to fly. It will be a six hour drive from [place] to [place], writer informed that this facility will be ok with her driving however, she would need to make contact with [facility], phone number provided and a message was sent to Poppy via e-mail ([facility]).

## 2020-03-13 ENCOUNTER — TELEPHONE (OUTPATIENT)
Dept: ORTHOPEDICS | Facility: CLINIC | Age: 65
End: 2020-03-13

## 2020-03-13 NOTE — TELEPHONE ENCOUNTER
Spoke with patient, she inquired about where to park when arriving for surgery next week. Writer informed that all travel has been suspended.  Encouraged her to call [facility] for further information.  Patient verbalized understanding.

## 2020-03-16 ENCOUNTER — HOSPITAL ENCOUNTER (OUTPATIENT)
Dept: PREADMISSION TESTING | Facility: HOSPITAL | Age: 65
Discharge: HOME | End: 2020-03-16
Attending: ANESTHESIOLOGY
Payer: COMMERCIAL

## 2020-03-17 ENCOUNTER — TELEPHONE (OUTPATIENT)
Dept: ORTHOPEDICS | Facility: CLINIC | Age: 65
End: 2020-03-17

## 2020-03-17 NOTE — TELEPHONE ENCOUNTER
Spoke with patient, she stated that she received a call (automated) stating that she missed an appointment.  She called Jonathan (P) and he assured her that the appointments were cancelled. Writer informed that she will receive a phone call when surgeries are re-scheduled, patient verbalized understanding.

## 2020-05-25 ENCOUNTER — TELEPHONE (OUTPATIENT)
Dept: ORTHOPEDICS | Facility: CLINIC | Age: 65
End: 2020-05-25

## 2020-05-25 DIAGNOSIS — Z01.818 PRE-OP TESTING: Primary | ICD-10-CM

## 2020-05-25 NOTE — TELEPHONE ENCOUNTER
Spoke with patient, informed of surgery date of 7-7-20 patient accepted, she has chosen [place] [facility] [place], informed of items NOT included in the bundle: DME, Meds, Post op PT, patient is active on the portal, encouraged to review education on portal, Telemed visit set up for 6-23-20 @  1000, confirmed Rehab facility (below). Patient asked if she could arrive on Saturday afternoon and have the COVID testing performed at that time, writer will research testing times on Saturday and return call.  Patient stated that her PCP was willing to f/u post-operatively.  Writer will confirm.  Patient verbalized understanding.    [facility]  [phone]   [place] [address]    Phone: [phone]  Fax: [phone]

## 2020-05-25 NOTE — TELEPHONE ENCOUNTER
Spoke with patient, informed that the COVID-19 testing has to be done within 48 hours of surgery, testing on Saturday would not be feasible as requested, patient verbalized understanding.    Patient confirmed that she has not had any health changes.     Patient will contact her dentist to have him fax over dental clearance.    Patient asked if her caregiver would have to be tested, writer informed that she would have to be tested.     Patient confirmed that she has her DME (walker & [employer]) and there is no need to order any more DME.

## 2020-06-01 DIAGNOSIS — M17.12 PRIMARY OSTEOARTHRITIS OF LEFT KNEE: Primary | ICD-10-CM

## 2020-06-03 ENCOUNTER — TELEPHONE (OUTPATIENT)
Dept: ORTHOPEDICS | Facility: CLINIC | Age: 65
End: 2020-06-03

## 2020-06-08 DIAGNOSIS — M17.12 PRIMARY OSTEOARTHRITIS OF LEFT KNEE: Primary | ICD-10-CM

## 2020-06-17 DIAGNOSIS — M17.12 PRIMARY OSTEOARTHRITIS OF LEFT KNEE: Primary | ICD-10-CM

## 2020-06-23 ENCOUNTER — TELEPHONE (OUTPATIENT)
Dept: ORTHOPEDICS | Facility: CLINIC | Age: 65
End: 2020-06-23

## 2020-06-23 ENCOUNTER — TELEPHONE (OUTPATIENT)
Dept: INTERNAL MEDICINE | Facility: CLINIC | Age: 65
End: 2020-06-23

## 2020-06-23 DIAGNOSIS — Z90.49 HISTORY OF APPENDECTOMY: ICD-10-CM

## 2020-06-23 DIAGNOSIS — Z83.3 FAMILY HISTORY OF DIABETES MELLITUS: ICD-10-CM

## 2020-06-23 DIAGNOSIS — Z82.49 FAMILY HISTORY OF HEART DISEASE: ICD-10-CM

## 2020-06-23 PROBLEM — T14.8XXA FRACTURE: Status: ACTIVE | Noted: 2020-06-23

## 2020-06-23 NOTE — TELEPHONE ENCOUNTER
"Spoke to her on the Phone    Virtual evaluation   Virtual pre op evaluation   Audio only   Location of the patient- House   Unable to video - patient's choice  Consent obtained for virtual evaluation     Each patient to whom he or she provides medical services by telemedicine is:  (1) informed of the relationship between the physician and patient and the respective role of any other health care provider with respect to management of the patient; and (2) notified that he or she may decline to receive medical services by telemedicine and may withdraw from such care at any time.    PCP-  Primary Doctor No    Future cases for Mirna Eason [[id number]]     Case ID Status Date Time Myron Procedure Provider Location    [id number] [facility] 7/7/2020  9:30  ARTHROPLASTY, KNEE - [facility] Srikanth Jones MD [[id number]] [facility] OR      Left     Under [facility] program     Left knee pain -      Has Moderate  pain in the Left knee -since Dec 2019   Increases with physical activity, better at rest , elevated   Had injury, fracture ( " crushed " )  to the Left foot 1994/95- fell down a flight of stairs- foot slipped    Had to have surgery , hardware   Part of the hardware was removed closer to 2000    No Left knee surgeries     Had the contralateral knee replacement in oct 2019 and did well with it     Subjectively describes health as good , other than the knees     Works as a  at [employer]  Off work since had Knee replacement     Son lives with her   Sister Rashmi to going to help     Not known to have heart disease , HTN, Diabetes Mellitus, Lung disease     Medication     Tylenol 1000 mg , 2-3 times a week  Colace - stool softener   No longer taking Aleve , Tums       Medication and allergies reviewed        Active cardiac conditions- None -  RCRI-1  ( Stroke/ TIA)   MET's- > 4 METs- Drives, yard work, goes to the store , self caring , can take 1 flight of stair holding on the railing- better since had knee " replacement    Had stress testing in the past-2009/2010- No CAD per her understanding      ROS     No fever , chill-  Weight stable-   STOPBANG-  age   No sudden vision changes -  No cough, phlegm-  No hemoptysis -  No Overt GI/ bleeding -  No longer having Stomach upset with aleve   No steroid treatment recently -  No Dysuria, Hesitancy-  No rashes -  No syncope , unilateral weakness -  No easy bruising/ bleeding  -  Depression , anxiety- none     Surgical history      No anesthesia , bleeding , cardiac trouble , PONV with previous surgeries, procedures      Social      Noted     FH- No anesthesia,bleeding / venous thrombosisin family     Constipation     Long standing constipation   Had colonoscopy - last 2008 - as per her - no problem per her     Meniere disease     Left side   Doing well  Takes Triamterene - HCTZ - daily for Meniere's which is also helping with lower extremity edema     Acid reflux     Takes Tums as needed  Lower Central burning feeling   With certain foods ( Spice foods, tacos)  No radiation     Born blind - Left eye     Functional  Drives , works    Edema    Lower extremities   When on her feet   Elevation helps    History of tobacco use    Quit in 2008   Smoked for 15 years , 1.5  packs per day     History of TIA    2002/2003  Woke up with Left sided weakness( Arm,leg ) -noticed at 1-2 AM, on turning over , felt like tingling -short lasting - 10 minutes  Woke her , he did massage to the Leg and she massaged her Left arm-feeling started to come back  ? Duration of weakness  Was taken to PCP in the morning- Was given an appointment to the MD at the hospital - same day- [place], [place]  Was told that she had a mild , mini stroke  Had home exercises 1-2 months  Weakness resolved after 1-2 months  She could not tell the cause of her mini stroke   Not known to have HTN, Diabetes,   No heart arrhythmia history  Had Carotid testing -No blocked arteries to her  understanding  Was smoking tobacco at that time      Over weight     BMI-28.2

## 2020-06-23 NOTE — TELEPHONE ENCOUNTER
Spoke to pt, pt states she and caregiver are arriving on 7/4 via car to be here for Covis test on 7/5. Pt will bring walker. Pt would like to use Lyft for transportation while here. Informed pt of safety measures for Covid. Pt states she is scheduled for OP PT on 7/15 and PCP will follow her, she will schedule initial appt the week of 7/21. Answered questions/concerns. Pt verbalized understanding.

## 2020-07-02 ENCOUNTER — DOCUMENTATION ONLY (OUTPATIENT)
Dept: INTERNAL MEDICINE | Facility: CLINIC | Age: 65
End: 2020-07-02

## 2020-07-05 ENCOUNTER — LAB VISIT (OUTPATIENT)
Dept: SPORTS MEDICINE | Facility: CLINIC | Age: 65
End: 2020-07-05
Payer: COMMERCIAL

## 2020-07-05 DIAGNOSIS — Z01.818 PRE-OP TESTING: ICD-10-CM

## 2020-07-05 DIAGNOSIS — Z96.652 STATUS POST TOTAL LEFT KNEE REPLACEMENT: Primary | ICD-10-CM

## 2020-07-05 PROCEDURE — U0003 INFECTIOUS AGENT DETECTION BY NUCLEIC ACID (DNA OR RNA); SEVERE ACUTE RESPIRATORY SYNDROME CORONAVIRUS 2 (SARS-COV-2) (CORONAVIRUS DISEASE [COVID-19]), AMPLIFIED PROBE TECHNIQUE, MAKING USE OF HIGH THROUGHPUT TECHNOLOGIES AS DESCRIBED BY CMS-2020-01-R: HCPCS

## 2020-07-05 RX ORDER — DEXTROMETHORPHAN HYDROBROMIDE, GUAIFENESIN 5; 100 MG/5ML; MG/5ML
650 LIQUID ORAL EVERY 8 HOURS PRN
Qty: 120 TABLET | Refills: 0 | Status: SHIPPED | OUTPATIENT
Start: 2020-07-05

## 2020-07-05 RX ORDER — OXYCODONE HYDROCHLORIDE 5 MG/1
TABLET ORAL
Qty: 50 TABLET | Refills: 0 | Status: SHIPPED | OUTPATIENT
Start: 2020-07-05 | End: 2020-07-13 | Stop reason: SDUPTHER

## 2020-07-05 RX ORDER — DOCUSATE SODIUM 100 MG/1
100 CAPSULE, LIQUID FILLED ORAL 2 TIMES DAILY PRN
Qty: 60 CAPSULE | Refills: 0 | Status: SHIPPED | OUTPATIENT
Start: 2020-07-05

## 2020-07-05 RX ORDER — CELECOXIB 200 MG/1
200 CAPSULE ORAL DAILY
Qty: 30 CAPSULE | Refills: 0 | Status: SHIPPED | OUTPATIENT
Start: 2020-07-05 | End: 2020-08-06

## 2020-07-06 ENCOUNTER — OFFICE VISIT (OUTPATIENT)
Dept: ORTHOPEDICS | Facility: CLINIC | Age: 65
End: 2020-07-06
Payer: COMMERCIAL

## 2020-07-06 ENCOUNTER — ANESTHESIA EVENT (OUTPATIENT)
Dept: SURGERY | Facility: HOSPITAL | Age: 65
DRG: 470 | End: 2020-07-06
Payer: COMMERCIAL

## 2020-07-06 ENCOUNTER — HOSPITAL ENCOUNTER (OUTPATIENT)
Dept: CARDIOLOGY | Facility: CLINIC | Age: 65
Discharge: HOME | End: 2020-07-06
Payer: COMMERCIAL

## 2020-07-06 ENCOUNTER — HOSPITAL ENCOUNTER (OUTPATIENT)
Dept: RADIOLOGY | Facility: HOSPITAL | Age: 65
Discharge: HOME | End: 2020-07-06
Attending: ORTHOPAEDIC SURGERY
Payer: COMMERCIAL

## 2020-07-06 ENCOUNTER — LAB VISIT (OUTPATIENT)
Dept: SURGERY | Facility: CLINIC | Age: 65
End: 2020-07-06
Payer: COMMERCIAL

## 2020-07-06 ENCOUNTER — INITIAL CONSULT (OUTPATIENT)
Dept: INTERNAL MEDICINE | Facility: CLINIC | Age: 65
End: 2020-07-06
Payer: COMMERCIAL

## 2020-07-06 ENCOUNTER — HOSPITAL ENCOUNTER (OUTPATIENT)
Dept: PREADMISSION TESTING | Facility: HOSPITAL | Age: 65
Discharge: HOME | End: 2020-07-06
Attending: HOSPITALIST
Payer: COMMERCIAL

## 2020-07-06 VITALS — WEIGHT: 177 LBS | BODY MASS INDEX: 28.45 KG/M2 | HEIGHT: 66 IN

## 2020-07-06 VITALS
HEART RATE: 84 BPM | RESPIRATION RATE: 16 BRPM | HEIGHT: 66 IN | DIASTOLIC BLOOD PRESSURE: 74 MMHG | WEIGHT: 179 LBS | BODY MASS INDEX: 28.77 KG/M2 | SYSTOLIC BLOOD PRESSURE: 123 MMHG | OXYGEN SATURATION: 96 % | TEMPERATURE: 98 F

## 2020-07-06 VITALS
SYSTOLIC BLOOD PRESSURE: 120 MMHG | BODY MASS INDEX: 28.34 KG/M2 | DIASTOLIC BLOOD PRESSURE: 82 MMHG | HEIGHT: 66 IN | TEMPERATURE: 98 F | HEART RATE: 75 BPM | WEIGHT: 176.38 LBS

## 2020-07-06 DIAGNOSIS — M17.12 PRIMARY OSTEOARTHRITIS OF LEFT KNEE: ICD-10-CM

## 2020-07-06 DIAGNOSIS — Z90.49 HISTORY OF APPENDECTOMY: ICD-10-CM

## 2020-07-06 DIAGNOSIS — I49.9 CARDIAC ARRHYTHMIA, UNSPECIFIED CARDIAC ARRHYTHMIA TYPE: ICD-10-CM

## 2020-07-06 DIAGNOSIS — Z86.73 HISTORY OF TIA (TRANSIENT ISCHEMIC ATTACK): ICD-10-CM

## 2020-07-06 DIAGNOSIS — R60.9 EDEMA, UNSPECIFIED TYPE: ICD-10-CM

## 2020-07-06 DIAGNOSIS — M17.12 PRIMARY OSTEOARTHRITIS OF LEFT KNEE: Primary | ICD-10-CM

## 2020-07-06 DIAGNOSIS — D64.9 ANEMIA, UNSPECIFIED TYPE: ICD-10-CM

## 2020-07-06 DIAGNOSIS — H81.02 MENIERE'S DISEASE OF LEFT EAR: ICD-10-CM

## 2020-07-06 DIAGNOSIS — D75.89 MACROCYTOSIS: ICD-10-CM

## 2020-07-06 DIAGNOSIS — Z83.3 FAMILY HISTORY OF DIABETES MELLITUS: ICD-10-CM

## 2020-07-06 DIAGNOSIS — Z01.818 PRE-OP TESTING: ICD-10-CM

## 2020-07-06 DIAGNOSIS — Z87.891 HISTORY OF TOBACCO USE: ICD-10-CM

## 2020-07-06 DIAGNOSIS — Z86.718 HISTORY OF THROMBOSIS: ICD-10-CM

## 2020-07-06 DIAGNOSIS — E66.3 OVER WEIGHT: ICD-10-CM

## 2020-07-06 DIAGNOSIS — Z82.49 FAMILY HISTORY OF HEART DISEASE: ICD-10-CM

## 2020-07-06 DIAGNOSIS — K59.00 CONSTIPATION, UNSPECIFIED CONSTIPATION TYPE: ICD-10-CM

## 2020-07-06 DIAGNOSIS — K21.9 GASTROESOPHAGEAL REFLUX DISEASE, ESOPHAGITIS PRESENCE NOT SPECIFIED: ICD-10-CM

## 2020-07-06 DIAGNOSIS — T14.8XXA FRACTURE: ICD-10-CM

## 2020-07-06 DIAGNOSIS — Z01.818 PRE-OP TESTING: Primary | ICD-10-CM

## 2020-07-06 DIAGNOSIS — Z92.89 HISTORY OF STRESS TEST: ICD-10-CM

## 2020-07-06 PROBLEM — R29.898 WEAKNESS OF RIGHT LOWER EXTREMITY: Status: RESOLVED | Noted: 2019-10-25 | Resolved: 2020-07-06

## 2020-07-06 PROBLEM — M17.11 PRIMARY OSTEOARTHRITIS OF RIGHT KNEE: Status: RESOLVED | Noted: 2019-10-22 | Resolved: 2020-07-06

## 2020-07-06 LAB — SARS-COV-2 RNA RESP QL NAA+PROBE: NOT DETECTED

## 2020-07-06 PROCEDURE — 99999 PR PBB SHADOW E&M-EST. PATIENT-LVL IV: ICD-10-PCS | Mod: PBBFAC,COE,, | Performed by: PHYSICIAN ASSISTANT

## 2020-07-06 PROCEDURE — 93010 EKG 12-LEAD: ICD-10-PCS | Mod: S$GLB,COE,, | Performed by: INTERNAL MEDICINE

## 2020-07-06 PROCEDURE — 93005 ELECTROCARDIOGRAM TRACING: CPT

## 2020-07-06 PROCEDURE — 73560 XR KNEE 1 OR 2 VIEW LEFT: ICD-10-PCS | Mod: 26,LT,COE, | Performed by: RADIOLOGY

## 2020-07-06 PROCEDURE — 93010 ELECTROCARDIOGRAM REPORT: CPT | Mod: S$GLB,COE,, | Performed by: INTERNAL MEDICINE

## 2020-07-06 PROCEDURE — 99499 UNLISTED E&M SERVICE: CPT | Mod: S$GLB,COE,, | Performed by: PHYSICIAN ASSISTANT

## 2020-07-06 PROCEDURE — 99999 PR PBB SHADOW E&M-EST. PATIENT-LVL I: ICD-10-PCS | Mod: PBBFAC,COE,, | Performed by: HOSPITALIST

## 2020-07-06 PROCEDURE — 99999 PR PBB SHADOW E&M-EST. PATIENT-LVL III: ICD-10-PCS | Mod: PBBFAC,COE,, | Performed by: ORTHOPAEDIC SURGERY

## 2020-07-06 PROCEDURE — 99499 NO LOS: ICD-10-PCS | Mod: S$GLB,COE,, | Performed by: PHYSICIAN ASSISTANT

## 2020-07-06 PROCEDURE — 99999 PR PBB SHADOW E&M-EST. PATIENT-LVL IV: CPT | Mod: PBBFAC,COE,, | Performed by: PHYSICIAN ASSISTANT

## 2020-07-06 PROCEDURE — 99214 PR OFFICE/OUTPT VISIT, EST, LEVL IV, 30-39 MIN: ICD-10-PCS | Mod: S$GLB,COE,, | Performed by: HOSPITALIST

## 2020-07-06 PROCEDURE — 99999 PR PBB SHADOW E&M-EST. PATIENT-LVL III: CPT | Mod: PBBFAC,COE,, | Performed by: ORTHOPAEDIC SURGERY

## 2020-07-06 PROCEDURE — 99999 PR PBB SHADOW E&M-EST. PATIENT-LVL I: CPT | Mod: PBBFAC,COE,, | Performed by: HOSPITALIST

## 2020-07-06 PROCEDURE — 99214 OFFICE O/P EST MOD 30 MIN: CPT | Mod: S$GLB,COE,, | Performed by: HOSPITALIST

## 2020-07-06 PROCEDURE — 73560 X-RAY EXAM OF KNEE 1 OR 2: CPT | Mod: TC,LT

## 2020-07-06 PROCEDURE — 99499 UNLISTED E&M SERVICE: CPT | Mod: S$GLB,COE,, | Performed by: ORTHOPAEDIC SURGERY

## 2020-07-06 PROCEDURE — 99499 NO LOS: ICD-10-PCS | Mod: S$GLB,COE,, | Performed by: ORTHOPAEDIC SURGERY

## 2020-07-06 PROCEDURE — 73560 X-RAY EXAM OF KNEE 1 OR 2: CPT | Mod: 26,LT,COE, | Performed by: RADIOLOGY

## 2020-07-06 RX ORDER — PREGABALIN 25 MG/1
150 CAPSULE ORAL
Status: CANCELLED | OUTPATIENT
Start: 2020-07-06

## 2020-07-06 RX ORDER — ACETAMINOPHEN 500 MG
1000 TABLET ORAL EVERY 6 HOURS
Status: CANCELLED | OUTPATIENT
Start: 2020-07-06 | End: 2020-07-08

## 2020-07-06 RX ORDER — TALC
6 POWDER (GRAM) TOPICAL NIGHTLY PRN
Status: CANCELLED | OUTPATIENT
Start: 2020-07-06

## 2020-07-06 RX ORDER — ONDANSETRON 2 MG/ML
4 INJECTION INTRAMUSCULAR; INTRAVENOUS EVERY 8 HOURS PRN
Status: CANCELLED | OUTPATIENT
Start: 2020-07-06

## 2020-07-06 RX ORDER — FAMOTIDINE 20 MG/1
20 TABLET, FILM COATED ORAL 2 TIMES DAILY
Status: CANCELLED | OUTPATIENT
Start: 2020-07-06

## 2020-07-06 RX ORDER — FENTANYL CITRATE 50 UG/ML
25 INJECTION, SOLUTION INTRAMUSCULAR; INTRAVENOUS EVERY 5 MIN PRN
Status: CANCELLED | OUTPATIENT
Start: 2020-07-06

## 2020-07-06 RX ORDER — BISACODYL 10 MG
10 SUPPOSITORY, RECTAL RECTAL EVERY 12 HOURS PRN
Status: CANCELLED | OUTPATIENT
Start: 2020-07-06

## 2020-07-06 RX ORDER — SODIUM CHLORIDE 9 MG/ML
INJECTION, SOLUTION INTRAVENOUS
Status: CANCELLED | OUTPATIENT
Start: 2020-07-06

## 2020-07-06 RX ORDER — MUPIROCIN 20 MG/G
1 OINTMENT TOPICAL
Status: CANCELLED | OUTPATIENT
Start: 2020-07-06

## 2020-07-06 RX ORDER — MORPHINE SULFATE 10 MG/ML
2 INJECTION, SOLUTION INTRAMUSCULAR; INTRAVENOUS
Status: CANCELLED | OUTPATIENT
Start: 2020-07-06

## 2020-07-06 RX ORDER — OXYCODONE HYDROCHLORIDE 5 MG/1
5 TABLET ORAL
Status: CANCELLED | OUTPATIENT
Start: 2020-07-06

## 2020-07-06 RX ORDER — CELECOXIB 100 MG/1
200 CAPSULE ORAL DAILY
Status: CANCELLED | OUTPATIENT
Start: 2020-07-06

## 2020-07-06 RX ORDER — SODIUM CHLORIDE 9 MG/ML
INJECTION, SOLUTION INTRAVENOUS CONTINUOUS
Status: CANCELLED | OUTPATIENT
Start: 2020-07-06 | End: 2020-07-07

## 2020-07-06 RX ORDER — MUPIROCIN 20 MG/G
1 OINTMENT TOPICAL 2 TIMES DAILY
Status: CANCELLED | OUTPATIENT
Start: 2020-07-06 | End: 2020-07-11

## 2020-07-06 RX ORDER — NALOXONE HCL 0.4 MG/ML
0.02 VIAL (ML) INJECTION
Status: CANCELLED | OUTPATIENT
Start: 2020-07-06

## 2020-07-06 RX ORDER — POLYETHYLENE GLYCOL 3350 17 G/17G
17 POWDER, FOR SOLUTION ORAL DAILY
Status: CANCELLED | OUTPATIENT
Start: 2020-07-06

## 2020-07-06 RX ORDER — MIDAZOLAM HYDROCHLORIDE 1 MG/ML
1 INJECTION INTRAMUSCULAR; INTRAVENOUS EVERY 5 MIN PRN
Status: CANCELLED | OUTPATIENT
Start: 2020-07-06

## 2020-07-06 RX ORDER — LIDOCAINE HYDROCHLORIDE 10 MG/ML
1 INJECTION, SOLUTION EPIDURAL; INFILTRATION; INTRACAUDAL; PERINEURAL
Status: CANCELLED | OUTPATIENT
Start: 2020-07-06

## 2020-07-06 RX ORDER — PREGABALIN 25 MG/1
150 CAPSULE ORAL NIGHTLY
Status: CANCELLED | OUTPATIENT
Start: 2020-07-06

## 2020-07-06 RX ORDER — ACETAMINOPHEN 500 MG
1000 TABLET ORAL
Status: CANCELLED | OUTPATIENT
Start: 2020-07-06

## 2020-07-06 RX ORDER — OXYCODONE HYDROCHLORIDE 5 MG/1
10 TABLET ORAL
Status: CANCELLED | OUTPATIENT
Start: 2020-07-06

## 2020-07-06 RX ORDER — AMOXICILLIN 250 MG
1 CAPSULE ORAL 2 TIMES DAILY
Status: CANCELLED | OUTPATIENT
Start: 2020-07-06

## 2020-07-06 RX ORDER — ROPIVACAINE HYDROCHLORIDE 2 MG/ML
8 INJECTION, SOLUTION EPIDURAL; INFILTRATION; PERINEURAL CONTINUOUS
Status: CANCELLED | OUTPATIENT
Start: 2020-07-06

## 2020-07-06 RX ORDER — CELECOXIB 100 MG/1
400 CAPSULE ORAL
Status: CANCELLED | OUTPATIENT
Start: 2020-07-06

## 2020-07-06 RX ORDER — SODIUM CHLORIDE 0.9 % (FLUSH) 0.9 %
10 SYRINGE (ML) INJECTION
Status: CANCELLED | OUTPATIENT
Start: 2020-07-06

## 2020-07-06 RX ORDER — ASPIRIN 81 MG/1
81 TABLET ORAL 2 TIMES DAILY
Status: CANCELLED | OUTPATIENT
Start: 2020-07-06

## 2020-07-06 NOTE — ASSESSMENT & PLAN NOTE
2002/2003  Woke up with Left sided weakness( Arm,leg ) -noticed at 1-2 AM, on turning over , felt like tingling -short lasting - 10 - 15 minutes  Woke her , he did massage to the Leg and she massaged her Left arm-feeling started to come back  ? Duration of weakness  Was taken to PCP in the morning- Was given an appointment to the MD at the hospital - same day- [facility] [place], [place]  Was told that she had a mild , mini stroke- had not been hospitalized   Had home exercises 1-2 months  Weakness resolved after 1-2 months  She could not tell the cause of her mini stroke   Not known to have HTN, Diabetes,   No heart arrhythmia history  Had Carotid testing in the past-No blocked arteries to her understanding  Was smoking tobacco at that time      No stroke since  Was not discharged on ASA

## 2020-07-06 NOTE — H&P
CC: Left knee pain    Mirna Eason is a 65 y.o. female with history of Left knee pain. Pain is worse with activity and weight bearing.  Patient has experienced interference of activities of daily living due to decreased range of motion and an increase in joint pain and swelling.  Patient has failed non-operative treatment including NSAIDs, corticosteroid injections, viscosupplement injections, and activity modification.  Mirna Eason currently ambulates independently.   Had R TKA 10/22/2019.     Relevant medical conditions of significance in perioperative period:  H/o tobacco abuse: quit smoking 2007  H/o RLE DVT: 2019, questionable. Had Eliquis after R TKA  GERD: uses tums     Past Medical History:   Diagnosis Date    Arthritis     GERD (gastroesophageal reflux disease)     Meniere disease, left        Past Surgical History:   Procedure Laterality Date    appendectomy  1980's    had ovary, tube removed for tubal pregnancy     CHOLECYSTECTOMY      fracture surgery- fell down thestairs- Ankle - 80's      mechanical fall     JOINT REPLACEMENT      LAPAROSCOPIC CHOLECYSTECTOMY  2009/2010    oophorectomy, Salpix- Rt - 1980's      TOTAL KNEE ARTHROPLASTY Right 10/22/2019    Procedure: ARTHROPLASTY, KNEE, TOTAL-[facility];  Surgeon: Srikanth Jones MD;  Location: [facility];  Service: Orthopedics;  Laterality: Right;       Family History   Problem Relation Age of Onset    Heart disease Father 49        heart bypass    Diabetes Mellitus Mother        Review of patient's allergies indicates:  No Known Allergies      Current Outpatient Medications:     acetaminophen (TYLENOL) 650 MG TbSR, Take 1 tablet (650 mg total) by mouth every 8 (eight) hours as needed. (Patient taking differently: Take 1,300 mg by mouth 2 (two) times daily as needed. Take if needed Left knee pain), Disp: 120 tablet, Rfl: 0    acetaminophen (TYLENOL) 650 MG TbSR, Take 1 tablet (650 mg total) by mouth every 8 (eight) hours as needed.  (Patient taking differently: Take 650 mg by mouth every 8 (eight) hours as needed. POSTOP RX), Disp: 120 tablet, Rfl: 0    [START ON 7/8/2020] apixaban (ELIQUIS) 2.5 mg Tab, Take 1 tablet (2.5 mg total) by mouth 2 (two) times daily. (Patient taking differently: Take 2.5 mg by mouth 2 (two) times daily. POSTOP RX), Disp: 60 tablet, Rfl: 0    calcium carbonate (TUMS ORAL), Take by mouth as needed (acid reflux). Hold am of surgery, Disp: , Rfl:     celecoxib (CELEBREX) 200 MG capsule, Take 1 capsule (200 mg total) by mouth once daily. (Patient taking differently: Take 200 mg by mouth once daily. POSTOP RX), Disp: 30 capsule, Rfl: 0    docusate sodium (COLACE) 100 MG capsule, Take 1 capsule (100 mg total) by mouth 2 (two) times daily as needed for Constipation. (Patient taking differently: Take 100 mg by mouth 2 (two) times daily as needed for Constipation. Hold am of surgery), Disp: 60 capsule, Rfl: 0    docusate sodium (STOOL SOFTENER ORAL), Take 2 tablets by mouth 2 (two) times daily. Hold am of surgery, Disp: , Rfl:     naproxen sodium (ALEVE ORAL), Take by mouth as needed. Left knee pain Hold for 1 week prior to surgery, Disp: , Rfl:     oxyCODONE (ROXICODONE) 5 MG immediate release tablet, Take 1-2 tabs by mouth every 4-6 hours as needed for pain (Patient taking differently: Take 1-2 tabs by mouth every 4-6 hours as needed for pain Pt took after last knee surgery 2019-take if needed), Disp: 50 tablet, Rfl: 0    triamterene-hydrochlorothiazide 37.5-25 mg (DYAZIDE) 37.5-25 mg per capsule, Take 1 capsule by mouth once daily. Hold am of surgery, Disp: , Rfl: 3    Review of Systems:  Constitutional: no fever or chills  Eyes: no visual changes  ENT: no nasal congestion or sore throat  Respiratory: no cough or shortness of breath  Cardiovascular: no chest pain or palpitations  Gastrointestinal: no nausea or vomiting, tolerating diet  Genitourinary: no hematuria or dysuria  Integument/Breast: no rash or  "pruritis  Hematologic/Lymphatic: no easy bruising or lymphadenopathy  Musculoskeletal: positive for knee pain  Neurological: no seizures or tremors  Behavioral/Psych: no auditory or visual hallucinations  Endocrine: no heat or cold intolerance    PE:  /82 (BP Location: Left arm, Patient Position: Sitting, BP Method: Medium (Automatic))   Pulse 75   Temp 97.5 °F (36.4 °C) (Oral)   Ht 5' 6" (1.676 m)   Wt 80 kg (176 lb 5.9 oz)   BMI 28.47 kg/m²   General: Pleasant, cooperative, NAD   Gait: antalgic  HEENT: NCAT, sclera nonicteric   Lungs: Respirations clear bilaterally; equal and unlabored.   CV: S1S2; 2+ bilateral upper and lower extremity pulses.   Skin: Intact throughout with no rashes, erythema, or lesions  Extremities: No LE edema,  no erythema or warmth of the skin in either lower extremity.    Left knee exam:  Knee Range of Motion:0-110 active, pain with passive range of motion  Effusion:none  Condition of skin:intact  Location of tenderness:Lateral joint line   Strength:5 of 5 quadriceps strength and 5 of 5 hamstring strength  Stability: stable to testing    Hip Examination: painless PROM of hip     Radiographs: Radiographs reveal advanced degenerative changes including subchondral cyst formation, subchondral sclerosis, osteophyte formation, joint space narrowing.     Knee Alignment:  Moderate valgus    Diagnosis: osteoarthritis Left knee    Plan: Left total knee arthroplasty    Due to the serious nature of total joint infection and the high prevalence of community acquired MRSA, vancomycin will be used perioperatively.            "

## 2020-07-06 NOTE — ASSESSMENT & PLAN NOTE
Edema- I suggested avoidance of added salt,avoidance of NSAID's, unless advised or ordered  and suggested Limb elevation and aundrea hose use

## 2020-07-06 NOTE — ASSESSMENT & PLAN NOTE
2011 - Had gall bladder removal - symptoms leading to that - Chest tightness   Went to ER  Had Stress test, EKG, Carotid imaging - To her understanding - no problem with heart , circulation  Further testing showed reduced gall bladder function leading to gall bladder rwemoval  No chest discomfort since   Chest discomfort was attributed to gall bladder problem  -     Stress test July 2010     Normal Lexiscan myocardial perfusion scan. LVEF 61%    2012     1. Negative dual isotope myocardial perfusion study. There are no  findings of diminished coronary vascular flow reserve/ischemia.  2. Normal gated SPECT with calculated EF of 55%.

## 2020-07-06 NOTE — DISCHARGE INSTRUCTIONS
Your surgery has been scheduled for:__________________________________________    You should report to:  ____[facility], located on the [place] side of the first floor of the           [facility] ([phone])  ____[facility], located on the [facility] side of the            Second floor of the [facility] ([phone])  ____[facility] [address]  Please Note   - Tell your doctor if you take Aspirin, products containing Aspirin, herbal medications  or blood thinners, such as Coumadin, Ticlid, or Plavix.  (Consult your provider regarding holding or stopping before surgery).  - Arrange for someone to drive you home following surgery.  You will not be allowed to leave the surgical facility alone or drive yourself home following sedation and anesthesia.  Before Surgery  - Stop taking all herbal medications 14days prior to surgery  - No Motrin/Advil (Ibuprofen) 7 days before surgery  - No Aleve (Naproxen) 7 days before surgery  - Stop Taking Aspirin, products containing Aspirin _____days before surgery  - Stop taking blood thinners_______days before surgery  - No Goody's/BC  Powder 7 days before surgery  - Refrain from drinking alcoholic beverages for 24hours before and after surgery  - Stop or limit smoking _________days before surgery  - You may take Tylenol for pain  Night before Surgery   Stop ALL solid food, gum, candy (including vitamins) 8 hours before arrival time.  (Please note: If your surgeon gives you different eating and drinking instructions, please follow surgeon's directions.)   Stop all CLOUDY liquids: coffee with creamer, formula, tube feeds, cloudy juices, non-human milk and breast milk with additives, 6 hours prior to arrival time.   Stop plain breast milk 4 hours prior to arrival time.   The patient should be ENCOURAGED to drink carbohydrate-rich clear liquids (sports  drinks, clear juices) until 2 hours prior to arrival time.   CLEAR liquids include only water, black coffee NO creamer, clear oral rehydration drinks, clear sports drinks or clear fruit juices (no orange juice, no pulpy juices, no apple cider). Advise patients if they can read newsprint through the liquid, it qualifies as clear liquid.    IF IN DOUBT, drink water instead.   - Take a shower or bath (shower is recommended).  Bathe with Hibiclens soap or an antibacterial soap from the neck down.  If not supplied by your surgeon, hibiclens soap will need to be purchased over the counter in pharmacy.  Rinse soap off thoroughly.  - Shampoo your hair with your regular shampoo  The Day of Surgery  · NOTHING TO  DRINK 2 hours before arrival time. If you are told to take medication on the morning of surgery, it may be taken with a sip of water.   - Take another bath or shower with hibiclens or any antibacterial soap, to reduce the chance of infection.  - Take heart and blood pressure medications with a small sip of water, as advised by the perioperative team.  - Do not take fluid pills  - You may brush your teeth and rinse your mouth, but do not swallow any additional water.   - Do not apply perfumes, powder, body lotions or deodorant on the day of surgery.  - Nail polish should be removed.  - Do not wear makeup or moisturizer  - Wear comfortable clothes, such as a button front shirt and loose fitting pants.  - Leave all jewelry, including body piercings, and valuables at home.    - Bring any devices you will neeed after surgery such as crutches or canes.  - If you have sleep apnea, please bring your CPAP machine  In the event that your physical condition changes including the onset of a cold or respiratory illness, or if you have to delay or cancel your surgery, please notify your surgeon.    Anesthesia: Regional Anesthesia    Youre scheduled for surgery. During surgery, youll receive medicine called anesthesia to keep  you comfortable and pain-free. Your surgeon has decided that youll receive regional anesthesia. This sheet tells you what to expect with this type of anesthesia.  What is regional anesthesia?  Regional anesthesia numbs one region of your body. The anesthesia may be given around nerves or into veins in your arms, neck, or legs (nerve block or Harman block). Or it may be sent into the spinal fluid (spinal anesthesia) or into the space just outside the spinal fluid (epidural anesthesia). You may also be given sedatives to help you relax.  Nerve block or Harman block  A small area of the body, such as an arm or leg, can be numbed using a nerve block or Harman block.  · Nerve block. During a nerve block, your skin is numbed. A needle is then inserted near nerves that serve the area to be numbed. Anesthetic is sent through the needle.  · IV regional or Harman block. For this type of block, an IV line is put into a vein. The blood flow to the area to be numbed is blocked for a short time. Anesthetic is sent through the IV.  Spinal anesthesia  Spinal anesthesia numbs your body from about the waist down.  · Anesthetic is injected into the spinal fluid. This is a substance that surrounds the spinal cord in your spinal column. The anesthetic blocks pain traveling from the body to the brain.  · To receive the anesthetic, your skin is numbed at the injection site on your back.  · A needle is then inserted into the spinal space. Anesthetic is sent into the spinal fluid through the needle.  Epidural anesthesia  Epidural anesthesia is most commonly used during childbirth and may also be used after surgical procedures of the chest, belly, and legs.  · Anesthetic is injected into the epidural space. This is just outside the dural sac which contains the spinal fluid.  · To receive the anesthetic, your skin is numbed at the injection site on your back.  · A needle is then inserted into the epidural space. Anesthetic is sent into the epidural  space through the needle.  · A small flexible catheter may be attached to the needle and left in place. This allows for continuous injections or infusions of anesthetic.  Anesthesia tools and medicines that might be near you during your procedure  · Local anesthetic. This medicine is given through a needle numbs one region of your body.  · Electrocardiography leads (electrodes). These are used to record your heart rate and rhythm.  · Blood pressure cuff. A cuff is placed on your arm to keep track of your blood pressure.  · Pulse oximeter. This small clip is placed on the end of the finger. It measures your blood oxygen level.  · Sedatives. These medicines may be given through an IV. They help to relax you and keep you comfortable. You may stay awake or sleep lightly.  · Oxygen. You may be given oxygen through a facemask.  Risks and possible complications  Regional anesthesia carries some risks. These include:  · Nausea and vomiting  · Headache  · Backache  · Decreased blood pressure  · Allergic reaction to the anesthetic  · Ongoing numbness (rare)  · Irregular heartbeat (rare)  · Cardiac arrest (rare)   Date Last Reviewed: 12/1/2016  © 4861-4988 [employer] [address] [place] [address]. All rights reserved. This information is not intended as a substitute for professional medical care. Always follow your healthcare professional's instructions.

## 2020-07-06 NOTE — PROGRESS NOTES
Subjective:      Patient ID: Mirna Eason is a 65 y.o. female.    Chief Complaint: Pain of the Left Knee    HPI  Mirna Eason is a 65 year old female here with a several month history of right knee pain. The patient is a   at [employer]. There was not a history of recent trauma.  The pain is severe The pain is located in the global aspect of the knee. There is is not radiation.    There is catching or locking.   The pain is described as achy. The patient has not had prior surgery. It is aggravated by sitting, standing and walking.  It is not alleviated by rest. There is not numbness or tingling of the lower extremity.  There is not back pain.  She  has tried medications but not injections. They have not helped.  She does have difficulty getting in or out of a car, getting dressed, or going up or down stairs.  The patient does not use an assistive device.      Past Medical History:   Diagnosis Date    Arthritis     GERD (gastroesophageal reflux disease)     Meniere disease, left      Past Surgical History:   Procedure Laterality Date    appendectomy  1980's    had ovary, tube removed for tubal pregnancy     CHOLECYSTECTOMY      fracture surgery- fell down thestairs- Ankle - 80's      mechanical fall     JOINT REPLACEMENT      LAPAROSCOPIC CHOLECYSTECTOMY  2009/2010    oophorectomy, [facility]- Rt - 1980's      TOTAL KNEE ARTHROPLASTY Right 10/22/2019    Procedure: ARTHROPLASTY, KNEE, TOTAL-[facility];  Surgeon: Srikanth Jones MD;  Location: [facility];  Service: Orthopedics;  Laterality: Right;     Family History   Problem Relation Age of Onset    Heart disease Father 49        heart bypass    Diabetes Mellitus Mother      Social History     Socioeconomic History    Marital status:      Spouse name: Not on file    Number of children: Not on file    Years of education: Not on file    Highest education level: Not on file   Occupational History    Not on file   Social Needs    Financial  resource strain: Not on file    Food insecurity     Worry: Not on file     Inability: Not on file    Transportation needs     Medical: Not on file     Non-medical: Not on file   Tobacco Use    Smoking status: Former Smoker     Years: 15.00     Quit date:      Years since quittin.5    Smokeless tobacco: Never Used   Substance and Sexual Activity    Alcohol use: Not Currently     Comment: quit at age 28 Y    Drug use: Never    Sexual activity: Not Currently   Lifestyle    Physical activity     Days per week: Not on file     Minutes per session: Not on file    Stress: Not on file   Relationships    Social connections     Talks on phone: Not on file     Gets together: Not on file     Attends Jewish service: Not on file     Active member of club or organization: Not on file     Attends meetings of clubs or organizations: Not on file     Relationship status: Not on file   Other Topics Concern    Not on file   Social History Narrative    Not on file     Current Outpatient Medications on File Prior to Visit   Medication Sig Dispense Refill    acetaminophen (TYLENOL) 650 MG TbSR Take 1 tablet (650 mg total) by mouth every 8 (eight) hours as needed. (Patient taking differently: Take 1,300 mg by mouth 2 (two) times daily as needed. Take if needed  Left knee pain) 120 tablet 0    acetaminophen (TYLENOL) 650 MG TbSR Take 1 tablet (650 mg total) by mouth every 8 (eight) hours as needed. (Patient taking differently: Take 650 mg by mouth every 8 (eight) hours as needed. POSTOP RX) 120 tablet 0    [START ON 2020] apixaban (ELIQUIS) 2.5 mg Tab Take 1 tablet (2.5 mg total) by mouth 2 (two) times daily. (Patient taking differently: Take 2.5 mg by mouth 2 (two) times daily. POSTOP RX) 60 tablet 0    calcium carbonate (TUMS ORAL) Take by mouth as needed (acid reflux). Hold am of surgery      celecoxib (CELEBREX) 200 MG capsule Take 1 capsule (200 mg total) by mouth once daily. (Patient taking differently:  Take 200 mg by mouth once daily. POSTOP RX) 30 capsule 0    docusate sodium (COLACE) 100 MG capsule Take 1 capsule (100 mg total) by mouth 2 (two) times daily as needed for Constipation. (Patient taking differently: Take 100 mg by mouth 2 (two) times daily as needed for Constipation. Hold am of surgery) 60 capsule 0    docusate sodium (STOOL SOFTENER ORAL) Take 2 tablets by mouth 2 (two) times daily. Hold am of surgery      oxyCODONE (ROXICODONE) 5 MG immediate release tablet Take 1-2 tabs by mouth every 4-6 hours as needed for pain (Patient taking differently: Take 1-2 tabs by mouth every 4-6 hours as needed for pain  Pt took after last knee surgery 2019-take if needed) 50 tablet 0    triamterene-hydrochlorothiazide 37.5-25 mg (DYAZIDE) 37.5-25 mg per capsule Take 1 capsule by mouth once daily. Hold am of surgery  3    naproxen sodium (ALEVE ORAL) Take by mouth as needed. Left knee pain  Hold for 1 week prior to surgery       No current facility-administered medications on file prior to visit.      Review of patient's allergies indicates:  No Known Allergies    Review of Systems   Constitution: Negative for chills, fever and night sweats.   HENT: Negative for hearing loss.    Eyes: Negative for blurred vision and double vision.   Cardiovascular: Negative for chest pain, claudication and leg swelling.   Respiratory: Negative for shortness of breath.    Endocrine: Negative for polydipsia, polyphagia and polyuria.   Hematologic/Lymphatic: Negative for adenopathy and bleeding problem. Does not bruise/bleed easily.   Skin: Negative for poor wound healing.   Gastrointestinal: Negative for diarrhea and heartburn.   Genitourinary: Negative for bladder incontinence.   Neurological: Negative for focal weakness, headaches, numbness, paresthesias and sensory change.   Psychiatric/Behavioral: The patient is not nervous/anxious.    Allergic/Immunologic: Negative for persistent infections.         Objective:      Body mass  "index is 28.57 kg/m².  Vitals:    20 1132   Weight: 80.3 kg (177 lb 0.5 oz)   Height: 5' 6" (1.676 m)         General    Constitutional: She is oriented to person, place, and time. She appears well-developed and well-nourished.   HENT:   Head: Normocephalic and atraumatic.   Eyes: EOM are normal.   Cardiovascular: Normal rate.    Pulmonary/Chest: Effort normal.   Neurological: She is alert and oriented to person, place, and time.   Psychiatric: She has a normal mood and affect. Her behavior is normal.     General Musculoskeletal Exam   Gait: abnormal and antalgic       Right Knee Exam     Inspection   Erythema: absent  Scars: present  Swelling: absent  Effusion: absent  Deformity: absent  Bruising: absent    Tenderness   The patient is experiencing no tenderness.     Range of Motion   Extension: 0   Flexion: 130     Tests   Ligament Examination Lachman: normal (-1 to 2mm)   MCL - Valgus: normal (0 to 2mm)  LCL - Varus: normal  Patella   Passive Patellar Tilt: neutral    Other   Sensation: normal    Left Knee Exam     Inspection   Erythema: absent  Scars: absent  Swelling: present  Effusion: absent  Deformity: present (valgus)  Bruising: absent    Tenderness   The patient tender to palpation of the lateral joint line and patella.    Crepitus   The patient has crepitus of the patella.    Range of Motion   Extension: 0   Flexion: 110     Tests   Stability Lachman: normal (-1 to 2mm)   MCL - Valgus: normal (0 to 2mm)  LCL - Varus: normal (0 to 2mm)  Patella   Passive Patellar Tilt: neutral    Other   Sensation: normal    Muscle Strength   Right Lower Extremity   Hip Abduction: 5/5   Quadriceps:  5/5   Hamstrin/5   Left Lower Extremity   Hip Abduction: 5/5   Quadriceps:  5/5   Hamstrin/5     Reflexes     Left Side  Quadriceps:  2+    Right Side   Quadriceps:  2+    Vascular Exam     Right Pulses  Dorsalis Pedis:      2+          Left Pulses  Dorsalis Pedis:      2+          Edema  Right Lower Leg: " absent  Left Lower Leg: absent      Radiographs taken today and reviewed by me demonstrate severe arthritic change of the left KNEE(s).There  is not bone destruction.  There is not a fracture. The lateral compartment is most involved.  There is a valgus deformity.  The changes are tricompartmental.            Assessment:       Encounter Diagnosis   Name Primary?    Primary osteoarthritis of left knee Yes          Plan:       Mirna was seen today for pain.    Diagnoses and all orders for this visit:    Primary osteoarthritis of left knee          Treatment options were discussed. The surgical process of left knee replacement was discussed in detail with the patient including a detailed discussion of the procedure itself (including visual model, x-ray review, and literature review). The typical perioperative and post-operative course was discussed and perioperative risks were discussed to the patient's satisfaction.  Risks and complications discussed included but were not limited to the risks of anesthetic complications, infection, bleeding, wound healing complications, stiffness, aseptic loosening, instability, limb length inequality, neurologic dysfunction including numbness and weakness, additional surgery,  DVT, pulmonary embolism, perioperative medical risks (cardiac, pulmonary, renal, neurologic), and death and the patient elects to proceed.  The patient should get medically cleared and attend the joint seminar.         Eliquis given history of possible DVT.

## 2020-07-06 NOTE — PROGRESS NOTES
Mirna Eason is a 65 y.o. year old here today for a pre-operative visit in preparation for a Left total knee arthroplasty to be performed by Dr. Jones on 6/7/2020.  she was last seen and treated in the clinic on 7/6/2020. she will be medically optimized by the pre op center. There has been no significant change in medical status since last visit. No fever, chills, malaise, or unexplained weight change.      Allergies, Medications, past medical and surgical history reviewed.    Focused examination performed.    Patient saw surgeon in clinic today. All questions answered. Patient encouraged to call with questions. Contact information given.     Pre, nhung, and post operative procedures and expectations discussed. Questions were answered. Mirna Eason has been educated and is ready to proceed with surgery. Approximately 30 minutes was spent discussing surgical outcomes, plans, procedures pre, nhung, and post operative expections and care.  Surgical consent signed.    Mirna Eason will contact us if there are any questions, concerns, or changes in medical status prior to surgery.       COVID-19 test date: done     patient will be scheduled with [facility].

## 2020-07-06 NOTE — OUTPATIENT SUBJECTIVE & OBJECTIVE
"Outpatient Subjective & Objective     Chief complaint-Preoperative evaluation, Perioperative Medical management, complication reduction plan     Active cardiac conditions- none    Revised cardiac risk index predictors- history of cerebrovascular disease    Functional capacity -Examples of physical activity, tries to stay active, walking , yard work,   can take a flight of stairs holding on to the railing----- She can undertake all the above activities without  chest pain,chest tightness, Shortness of breath ,dizziness,lightheadedness making her exercise tolerance more,   than 4 Mets.       Review of Systems   Constitutional: Negative for chills and fever.        No unusual weight changes       HENT:        STOPBANG score 1 / 8     Age over 50        Eyes:        No new visual changes   Respiratory:        No cough , phlegm  Occasional sinus problem- None recently   No Hemoptysis   Cardiovascular:        As noted   Gastrointestinal:        No overt GI/ blood losses  Bowel movements- Regular    Endocrine:        Prednisone use > 20 mg daily for 3 weeks- None   Genitourinary: Negative for dysuria.        No urinary hesitancy    Musculoskeletal:        As above      Skin: Negative for rash.   Neurological: Negative for syncope.        No unilateral weakness   Hematological:        Current use of Anticoagulants  None   Psychiatric/Behavioral:        No Depression,Anxiety     LMP- at about 45 Y         No vascular stenting    No anesthesia, bleeding, cardiac problems , PONV with previous surgeries/procedures.  Medications and Allergies reviewed in epic.   FH- No anesthesia,bleeding / venous thrombosis , problems  in family   Physical Exam    Vitals - 1 value per visit 7/6/2020   SYSTOLIC 123   DIASTOLIC 74   PULSE 84   TEMPERATURE 98   RESPIRATIONS 16   SPO2 96   Weight (lb) 179   Weight (kg) 81.194   HEIGHT 5' 6"   BODY MASS INDEX 28.89       Physical Exam  Constitutional-General appearance-Conscious,Coherent  Eyes- No " conjunctival icterus,pupils  round  and reactive to light   ENT-Oral cavity- moist  , Hearing grossly normal   Neck- No thyromegaly ,Trachea -central, No jugular venous distension,   No Carotid Bruit   Cardiovascular -Heart Sounds- Normal ,  no murmur  and  occasional irregularity suggestive of ectopy   , No gallop rhythm   Respiratory - Normal Respiratory Effort, Normal breath sounds,  no wheeze  and  no forced expiratory wheeze    Peripheral pitting pedal edema-- mild, no calf pain   Gastrointestinal -Soft abdomen, No palpable masses, Non Tender,Liver,Spleen not palpable. No-- free fluid and shifting dullness  Musculoskeletal- No finger Clubbing. Strength grossly normal   Lymphatic-No Palpable cervical, axillary,Inguinal lymphadenopathy   Psychiatric - normal effect,Orientation  Rt Dorsalis pedis pulses-palpable    Lt Dorsalis pedis pulses- palpable   Rt Posterior tibial pulses -palpable   Left posterior tibial pulses -palpable   Miscellaneous -  Surgical scarLeft ankle, Rt knee ,  no renal bruit and osteoarthritic nodes   Investigations  Lab and Imaging have been reviewed in epic.    Jan 2020     Hb - 11.4   HCT- 35.8       Dec 2019     Creat 0.74     Aug 2019     INR-N    Telemetry Oct 2019 - Sinus     Records request      She had Cardiac testing at [facility] in  [place]- around  [id number]  Requested to obtain EKG, Stress test , Carotid US from that time         Review of old records- Was done and information gathered regards to events leading to surgery and health conditions of significance in the perioperative period.    Outpatient Subjective & Objective

## 2020-07-06 NOTE — PROGRESS NOTES
Santia[facility] - Pre Op Consult  Progress Note    Patient Name: Mirna Eason  MRN: [id number]  Date of Evaluation- 07/06/2020  PCP- Primary Doctor No    Future cases for Mirna Eason [[id number]]     Case ID Status Date Time Myron Procedure Provider Location    [id number] [facility] 7/7/2020  9:30  ARTHROPLASTY, KNEE - BALDEMARMARKYE Jones MD [[id number]] [facility] OR          HPI:  History of present illness- I had the pleasure of meeting this pleasant 65 y.o. lady in the pre op clinic prior to her elective Orthopedic surgery. The patient is known  to me .     I have obtained the history by speaking to the patient and by reviewing the electronic health records.    Events leading up to surgery / History of presenting illness -    She has been troubled with moderate left knee pain for 6months.   Pain increases with activity and decreases with resting.      Relevant health conditions of significance for the perioperative period/ History of presenting illness -    Subjectively describes health as good        Works at [employer]   On her feet 8 hours a day   Was a    Currently not working      Lives alone - most of the times  Sister going to help post op             Subjective/ Objective:       Chief complaint-Preoperative evaluation, Perioperative Medical management, complication reduction plan     Active cardiac conditions- none    Revised cardiac risk index predictors- history of cerebrovascular disease    Functional capacity -Examples of physical activity, tries to stay active, walking , yard work,   can take a flight of stairs holding on to the railing----- She can undertake all the above activities without  chest pain,chest tightness, Shortness of breath ,dizziness,lightheadedness making her exercise tolerance more,   than 4 Mets.       Review of Systems   Constitutional: Negative for chills and fever.        No unusual weight changes       HENT:        STOPBANG score 1 / 8     Age over 50        Eyes:        No new visual  "changes   Respiratory:        No cough , phlegm  Occasional sinus problem- None recently   No Hemoptysis   Cardiovascular:        As noted   Gastrointestinal:        No overt GI/ blood losses  Bowel movements- Regular    Endocrine:        Prednisone use > 20 mg daily for 3 weeks- None   Genitourinary: Negative for dysuria.        No urinary hesitancy    Musculoskeletal:        As above      Skin: Negative for rash.   Neurological: Negative for syncope.        No unilateral weakness   Hematological:        Current use of Anticoagulants  None   Psychiatric/Behavioral:        No Depression,Anxiety     LMP- at about 45 Y         No vascular stenting    No anesthesia, bleeding, cardiac problems , PONV with previous surgeries/procedures.  Medications and Allergies reviewed in epic.   FH- No anesthesia,bleeding / venous thrombosis , problems  in family   Physical Exam    Vitals - 1 value per visit 7/6/2020   SYSTOLIC 123   DIASTOLIC 74   PULSE 84   TEMPERATURE 98   RESPIRATIONS 16   SPO2 96   Weight (lb) 179   Weight (kg) 81.194   HEIGHT 5' 6"   BODY MASS INDEX 28.89       Physical Exam  Constitutional-General appearance-Conscious,Coherent  Eyes- No conjunctival icterus,pupils  round  and reactive to light   ENT-Oral cavity- moist  , Hearing grossly normal   Neck- No thyromegaly ,Trachea -central, No jugular venous distension,   No Carotid Bruit   Cardiovascular -Heart Sounds- Normal ,  no murmur  and  occasional irregularity suggestive of ectopy   , No gallop rhythm   Respiratory - Normal Respiratory Effort, Normal breath sounds,  no wheeze  and  no forced expiratory wheeze    Peripheral pitting pedal edema-- mild, no calf pain   Gastrointestinal -Soft abdomen, No palpable masses, Non Tender,Liver,Spleen not palpable. No-- free fluid and shifting dullness  Musculoskeletal- No finger Clubbing. Strength grossly normal   Lymphatic-No Palpable cervical, axillary,Inguinal lymphadenopathy   Psychiatric - normal " effect,Orientation  Rt Dorsalis pedis pulses-palpable    Lt Dorsalis pedis pulses- palpable   Rt Posterior tibial pulses -palpable   Left posterior tibial pulses -palpable   Miscellaneous -  Surgical scarLeft ankle, Rt knee ,  no renal bruit and osteoarthritic nodes   Investigations  Lab and Imaging have been reviewed in epic.    Jan 2020     Hb - 11.4   HCT- 35.8       Dec 2019     Creat 0.74     Aug 2019     INR-N    Telemetry Oct 2019 - Sinus     Records request      She had Cardiac testing at [facility], [place], operated by [facility] in  [place]- around  [id number]  Requested to obtain EKG, Stress test , Carotid US from that time         Review of old records- Was done and information gathered regards to events leading to surgery and health conditions of significance in the perioperative period.        Preoperative cardiac risk assessment-  The patient does not have any active cardiac conditions . Revised cardiac risk index predictors-1 ---.Functional capacity is more than 4 Mets. She will be undergoing a Orthopedic procedure that carries a Moderate Risk risk   Risk of a major Cardiac event ( Defined as death, myocardial infarction, or cardiac arrest at 30 days after noncardiac surgery), based on RCRI score   6.0%         No further cardiac work up is indicated prior to proceeding with the surgery     Orders Placed This Encounter    SCHEDULED EKG 12-LEAD (to Muse)       American Society of Anesthesiologists Physical status classification ( ASA ) class:2     Postoperative pulmonary complication risk assessment:      ARISCAT ( Canet) risk index- risk class -  Low, if duration of surgery is under 3 hours, intermediate, if duration of surgery is over 3 hours          Assessment/Plan:     History of TIA (transient ischemic attack)  2002/2003  Woke up with Left sided weakness( Arm,leg ) -noticed at 1-2 AM, on turning over , felt like tingling -short lasting - 10 - 15 minutes  Woke her , he did massage to the Leg and she  massaged her Left arm-feeling started to come back  ? Duration of weakness  Was taken to PCP in the morning- Was given an appointment to the MD at the hospital - same day- [facility]- [place], [place]  Was told that she had a mild , mini stroke- had not been hospitalized   Had home exercises 1-2 months  Weakness resolved after 1-2 months  She could not tell the cause of her mini stroke   Not known to have HTN, Diabetes,   No heart arrhythmia history  Had Carotid testing in the past-No blocked arteries to her understanding  Was smoking tobacco at that time      No stroke since  Was not discharged on ASA     Over weight  BMI 28.89     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   Inward,deviation Left eye   No recent problem   Functional    Meniere disease  Left side   Takes Triamterene - HCTZ - daily for Meniere's which is also helping with lower extremity edema   No recent problem     Anemia  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 other 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  No blood transfusion   May 2019 - Anemia   Aleve - related -took for 5 years   No ulcer history   May 2019 - Hb 11.3   HCT - 35   Likely cause of Anemia is NSAID use     No overt GI/ bleeding     Macrocytosis  No history of low B12   No gastric / bowel surgery     Acid reflux  Takes Tums as needed  With certain foods  GERD  Symptoms -acid taste to the throat   Doing well    Constipation  Long standing constipation   Had colonoscopy - last 2008   Suggested follow up     History of appendectomy  1980's  had ovary, tube removed for tubal pregnancy     Family history of diabetes mellitus  Mother   No personal history of Diabetes    Family history of heart disease  No personal history of heart disease    History of tobacco use  Quit in 2008   Smoked for 15 years , 1.5  packs per day     Not known to have COPD, chronic Bronchitis, Emphysema, wheezing , chronic phlegm   No inhaler, oxygen use     Fracture- history of Left foot fracture   Fell down stairs   Foot slid    1994/1995   Hurt left foot, Left tibia- long time ago    Edema  Edema- I suggested avoidance of added salt,avoidance of NSAID's, unless advised or ordered  and suggested Limb elevation and aundrea hose use    History of thrombosis  Questionable diagnosis  About 2011    Had Rt lateral lower Thigh area discomfort - had it one day   Leading to ER visit   As per her ER MD felt that she had thrombosis , based on US   Hospitalized over night   History suggests possible ? Lovenox treatment   Was discharged home the next day  Was not on discharged on  Anticoagulation   She believes that she does not have thrombosis   She believes that it is muscle related as she was up and down the ladder that day   It is unclear that it she had thrombosis  The hospital where she was cared for is no longer open   As per her she had US March 1 st 2019 of the lower extremities and to her understanding no Thrombosis      No PE  Episode- 1  Associated with no reduced mobility, no long journeys, no cancer,no  prior surgery, no Hospital stay, no HRT,no tobacco use   Was working a lot at that time   IVC filter - None           Noted plan for post op anticoagulation     History of stress test  2011 - Had gall bladder removal - symptoms leading to that - Chest tightness   Went to ER  Had Stress test, EKG, Carotid imaging - To her understanding - no problem with heart , circulation  Further testing showed reduced gall bladder function leading to gall bladder rwemoval  No chest discomfort since   Chest discomfort was attributed to gall bladder problem  -     Stress test July 2010     Normal Lexiscan myocardial perfusion scan. LVEF 61%    2012     1. Negative dual isotope myocardial perfusion study. There are no  findings of diminished coronary vascular flow reserve/ischemia.  2. Normal gated SPECT with calculated EF of 55%.      Arrhythmia  Likely ectopy   Not very frequent   Does not drink coffee in excess         Preventive perioperative  care    Thromboembolic prophylaxis:  Her risk factors for thrombosis include surgical procedure and age.I suggest  thromboembolic prophylaxis ( mechanical/pharmacological, weighing the risk benefits of pharmacological agent use considering nhung procedural bleeding )  during the perioperative period.I suggested being active in the post operative period.   The patient is a candidate for extended DVT prophylaxis     Postoperative pulmonary complication prophylaxis-Risk factors for post operative pulmonary complications include age over 65 years- I suggest incentive spirometry use, early ambulation and end tidal carbon dioxide monitoring  , oral care , head end of bed elevation      Renal complication prophylaxis-. I suggest keeping her well hydrated  In  the perioperative period.     Surgical site Infection Prophylaxis-I  suggest appropriate antibiotic for Prophylaxis against Surgical site infections  No reported Staph infection        In view of regional anesthesia  the patient  is at risk of postoperative urinary retention.  I suggest avoidance / minimizing the of  Benzodiazepines,Anticholinergic medication,antihistamines ( Benadryl) , if possible in the perioperative period. I suggest using the minimum possible use of opioids for the minimum period of time in the perioperative period. Benadryl avoidance suggested      This visit was focused on Preoperative evaluation, Perioperative Medical management, complication reduction plans. I suggest that the patient follows up with primary care or relevant sub specialists for ongoing health care.    I appreciate the opportunity to be involved in this patients care. Please feel free to contact me if there were any questions about this consultation.    Patient is optimized     Patient  was instructed to call and update me about any changes to health,  medication, office visits ,testing out side of the [facility] operative care center , hospitalizations between now and surgery      Fátima Bustillo MD  Perioperative Medicine  [facility]   Pager [phone]    COVID screening     No fever -  No cough -  No SOB-  No sore throat -  No loss of taste or smell -  No muscle aches -  No nausea, vomiting , diarrhea-  --  7/6- 17 51    EKG from today personally reviewed - pending reporting showed no acute changes     Labs- Hb, HCT,PLT,INR, BMP -N    Older  records reviewed       Stress test July 2010     Normal Lexiscan myocardial perfusion scan. LVEF 61%    2012     1. Negative dual isotope myocardial perfusion study. There are no  findings of diminished coronary vascular flow reserve/ischemia.  2. Normal gated SPECT with calculated EF of 55%.      EKG from Nov 2016 reviewed   Note from March 2019 - indicated - no DVT     Called to discuss the above     Called to follow up , spoke to her  to address any concerns with the up coming surgery or any questions on Medication instructions -  Doing well ,No changes to Medication, Health -  No chest pain since gall bladder removal - chest pain was attributed to gall bladder problem  Had gall bladder removal around 2010 -   No diarrhea     Follow up with PCP suggested   -  7/6- 18 11    EKG report   Normal sinus rhythm   Nonspecific intraventricular conduction delay   Borderline Abnormal ECG   No previous ECGs available     Result released to her   Called and spoke to her

## 2020-07-06 NOTE — ASSESSMENT & PLAN NOTE
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

## 2020-07-06 NOTE — ASSESSMENT & PLAN NOTE
Quit in 2008   Smoked for 15 years , 1.5  packs per day     Not known to have COPD, chronic Bronchitis, Emphysema, wheezing , chronic phlegm   No inhaler, oxygen use

## 2020-07-06 NOTE — ASSESSMENT & PLAN NOTE
Questionable diagnosis  About 2011    Had Rt lateral lower Thigh area discomfort - had it one day   Leading to ER visit   As per her ER MD felt that she had thrombosis , based on US   Hospitalized over night   History suggests possible ? Lovenox treatment   Was discharged home the next day  Was not on discharged on  Anticoagulation   She believes that she does not have thrombosis   She believes that it is muscle related as she was up and down the ladder that day   It is unclear that it she had thrombosis  The hospital where she was cared for is no longer open   As per her she had US March 1 st 2019 of the lower extremities and to her understanding no Thrombosis      No PE  Episode- 1  Associated with no reduced mobility, no long journeys, no cancer,no  prior surgery, no Hospital stay, no HRT,no tobacco use   Was working a lot at that time   IVC filter - None           Noted plan for post op anticoagulation

## 2020-07-06 NOTE — ANESTHESIA PAT ROS NOTE
07/06/2020  Mirna Eason is a 65 y.o., female.      Pre-op Assessment          Review of Systems  Anesthesia Hx:  No problems with previous Anesthesia  History of prior surgery of interest to airway management or planning: Previous anesthesia: Spinal  10/2019 right knee replacement with spinal.  Procedure performed at an [facility]. Denies Family Hx of Anesthesia complications.   Denies Personal Hx of Anesthesia complications.   Social:  No Alcohol Use, Former Smoker    Hematology/Oncology:  Hematology Normal   Oncology Normal     EENT/Dental:   Eyes: Visual Impairment , Blind - left eye only  Ears General/Symptom(s) Ear Disease: HX Menière disease-takes Triameterene/HCTZ to decrease symptoms   Cardiovascular:  Functional Capacity good / => 4 METS    Pulmonary:   Denies Shortness of breath.  Denies Recent URI.  Asthma:  last episode was Hx of Childhood Asthma.    Hepatic/GI:  Hepatic/GI Symptoms: constipation.  Esophageal / Stomach Disorders Gerd Controlled by PRN antireflux medication.    Musculoskeletal:  Musculoskeletal General/Symptoms: joint pain, joint stiffness. Functional capacity is ambulatory without assistance.  Joint Disease:  Arthritis, Osteoarthritis    Neurological:  Neuro Symptoms of dizziness, vertigo, pain Pain , onset is chronic , location of left knee , alleviating factors are prn tylenol. Osteoarthritis  TIA - Transient Ischemic Attack , Most recent TIA was on 2000 , has had 1 TIA , transient deficits are no residual deficit.   Endocrine:  Endocrine Normal    Psych:  Psychiatric Normal           Physical Exam  General:  Well nourished    Airway/Jaw/Neck:  Airway Findings: Mouth Opening: Small, but > 3cm Tongue: Normal  Jaw/Neck Findings:  Neck ROM: Normal ROM      Dental:  Dental Findings: In tact   Chest/Lungs:  Chest/Lungs Findings: Clear to auscultation, Normal Respiratory Rate      Heart/Vascular:  Heart Findings: Rate: Normal  Rhythm: Regular Rhythm        Mental Status:  Mental Status Findings:  Cooperative, Alert and Oriented       7/6/20 Lab and EKG results noted, Covid pending.   Dr Bustillo clearance pending his review of EKG:  Preoperative cardiac risk assessment-  The patient does not have any active cardiac conditions . Revised cardiac risk index predictors-1 ---.Functional capacity is more than 4 Mets. She will be undergoing a Orthopedic procedure that carries a Moderate Risk risk   Risk of a major Cardiac event ( Defined as death, myocardial infarction, or cardiac arrest at 30 days after noncardiac surgery), based on RCRI score   6.0%            No further cardiac work up is indicated prior to proceeding with the surgery          Orders Placed This Encounter    SCHEDULED EKG 12-LEAD (to Muse)        American Society of Anesthesiologists Physical status classification ( ASA ) class:2     Postoperative pulmonary complication risk assessment:      ARISCAT ( Canet) risk index- risk class -  Low, if duration of surgery is under 3 hours, intermediate, if duration of surgery is over 3 hours

## 2020-07-06 NOTE — HPI
History of present illness- I had the pleasure of meeting this pleasant 65 y.o. lady in the pre op clinic prior to her elective Orthopedic surgery. The patient is known  to me .     I have obtained the history by speaking to the patient and by reviewing the electronic health records.    Events leading up to surgery / History of presenting illness -    She has been troubled with moderate left knee pain for 6months.   Pain increases with activity and decreases with resting.      Relevant health conditions of significance for the perioperative period/ History of presenting illness -    Subjectively describes health as good        Works at [employer]   On her feet 8 hours a day   Was a    Currently not working      Lives alone - most of the times  Sister going to help post op

## 2020-07-06 NOTE — H&P (VIEW-ONLY)
CC: Left knee pain    Mirna Eason is a 65 y.o. female with history of Left knee pain. Pain is worse with activity and weight bearing.  Patient has experienced interference of activities of daily living due to decreased range of motion and an increase in joint pain and swelling.  Patient has failed non-operative treatment including NSAIDs, corticosteroid injections, viscosupplement injections, and activity modification.  Mirna Eason currently ambulates independently.   Had R TKA 10/22/2019.     Relevant medical conditions of significance in perioperative period:  H/o tobacco abuse: quit smoking 2007  H/o RLE DVT: 2019, questionable. Had Eliquis after R TKA  GERD: uses tums     Past Medical History:   Diagnosis Date    Arthritis     GERD (gastroesophageal reflux disease)     Meniere disease, left        Past Surgical History:   Procedure Laterality Date    appendectomy  1980's    had ovary, tube removed for tubal pregnancy     CHOLECYSTECTOMY      fracture surgery- fell down thestairs- Ankle - 80's      mechanical fall     JOINT REPLACEMENT      LAPAROSCOPIC CHOLECYSTECTOMY  2009/2010    oophorectomy, Salpix- Rt - 1980's      TOTAL KNEE ARTHROPLASTY Right 10/22/2019    Procedure: [facility]ROPLAST[facility], KNEE, [facility];  Surgeon: Srikanth Jones MD;  Location: [facility];  Service: Orthopedics;  Laterality: Right;       Family History   Problem Relation Age of Onset    Heart disease Father 49        heart bypass    Diabetes Mellitus Mother        Review of patient's allergies indicates:  No Known Allergies      Current Outpatient Medications:     acetaminophen (TYLENOL) 650 MG TbSR, Take 1 tablet (650 mg total) by mouth every 8 (eight) hours as needed. (Patient taking differently: Take 1,300 mg by mouth 2 (two) times daily as needed. Take if needed Left knee pain), Disp: 120 tablet, Rfl: 0    acetaminophen (TYLENOL) 650 MG TbSR, Take 1 tablet (650 mg total) by mouth every 8 (eight) hours as needed.  (Patient taking differently: Take 650 mg by mouth every 8 (eight) hours as needed. POSTOP RX), Disp: 120 tablet, Rfl: 0    [START ON 7/8/2020] apixaban (ELIQUIS) 2.5 mg Tab, Take 1 tablet (2.5 mg total) by mouth 2 (two) times daily. (Patient taking differently: Take 2.5 mg by mouth 2 (two) times daily. POSTOP RX), Disp: 60 tablet, Rfl: 0    calcium carbonate (TUMS ORAL), Take by mouth as needed (acid reflux). Hold am of surgery, Disp: , Rfl:     celecoxib (CELEBREX) 200 MG capsule, Take 1 capsule (200 mg total) by mouth once daily. (Patient taking differently: Take 200 mg by mouth once daily. POSTOP RX), Disp: 30 capsule, Rfl: 0    docusate sodium (COLACE) 100 MG capsule, Take 1 capsule (100 mg total) by mouth 2 (two) times daily as needed for Constipation. (Patient taking differently: Take 100 mg by mouth 2 (two) times daily as needed for Constipation. Hold am of surgery), Disp: 60 capsule, Rfl: 0    docusate sodium (STOOL SOFTENER ORAL), Take 2 tablets by mouth 2 (two) times daily. Hold am of surgery, Disp: , Rfl:     naproxen sodium (ALEVE ORAL), Take by mouth as needed. Left knee pain Hold for 1 week prior to surgery, Disp: , Rfl:     oxyCODONE (ROXICODONE) 5 MG immediate release tablet, Take 1-2 tabs by mouth every 4-6 hours as needed for pain (Patient taking differently: Take 1-2 tabs by mouth every 4-6 hours as needed for pain Pt took after last knee surgery 2019-take if needed), Disp: 50 tablet, Rfl: 0    triamterene-hydrochlorothiazide 37.5-25 mg (DYAZIDE) 37.5-25 mg per capsule, Take 1 capsule by mouth once daily. Hold am of surgery, Disp: , Rfl: 3    Review of Systems:  Constitutional: no fever or chills  Eyes: no visual changes  ENT: no nasal congestion or sore throat  Respiratory: no cough or shortness of breath  Cardiovascular: no chest pain or palpitations  Gastrointestinal: no nausea or vomiting, tolerating diet  Genitourinary: no hematuria or dysuria  Integument/Breast: no rash or  "pruritis  Hematologic/Lymphatic: no easy bruising or lymphadenopathy  Musculoskeletal: positive for knee pain  Neurological: no seizures or tremors  Behavioral/Psych: no auditory or visual hallucinations  Endocrine: no heat or cold intolerance    PE:  /82 (BP Location: Left arm, Patient Position: Sitting, BP Method: Medium (Automatic))   Pulse 75   Temp 97.5 °F (36.4 °C) (Oral)   Ht 5' 6" (1.676 m)   Wt 80 kg (176 lb 5.9 oz)   BMI 28.47 kg/m²   General: Pleasant, cooperative, NAD   Gait: antalgic  HEENT: NCAT, sclera nonicteric   Lungs: Respirations clear bilaterally; equal and unlabored.   CV: S1S2; 2+ bilateral upper and lower extremity pulses.   Skin: Intact throughout with no rashes, erythema, or lesions  Extremities: No LE edema,  no erythema or warmth of the skin in either lower extremity.    Left knee exam:  Knee Range of Motion:0-110 active, pain with passive range of motion  Effusion:none  Condition of skin:intact  Location of tenderness:Lateral joint line   Strength:5 of 5 quadriceps strength and 5 of 5 hamstring strength  Stability: stable to testing    Hip Examination: painless PROM of hip     Radiographs: Radiographs reveal advanced degenerative changes including subchondral cyst formation, subchondral sclerosis, osteophyte formation, joint space narrowing.     Knee Alignment:  Moderate valgus    Diagnosis: osteoarthritis Left knee    Plan: Left total knee arthroplasty    Due to the serious nature of total joint infection and the high prevalence of community acquired MRSA, vancomycin will be used perioperatively.            "

## 2020-07-07 ENCOUNTER — HOSPITAL ENCOUNTER (INPATIENT)
Facility: HOSPITAL | Age: 65
LOS: 1 days | Discharge: HOME | DRG: 470 | End: 2020-07-08
Attending: ORTHOPAEDIC SURGERY | Admitting: ORTHOPAEDIC SURGERY
Payer: COMMERCIAL

## 2020-07-07 ENCOUNTER — ANESTHESIA (OUTPATIENT)
Dept: SURGERY | Facility: HOSPITAL | Age: 65
DRG: 470 | End: 2020-07-07
Payer: COMMERCIAL

## 2020-07-07 DIAGNOSIS — M17.12 PRIMARY OSTEOARTHRITIS OF LEFT KNEE: ICD-10-CM

## 2020-07-07 PROCEDURE — 11000001 HC ACUTE MED/SURG PRIVATE ROOM

## 2020-07-07 PROCEDURE — 63600175 PHARM REV CODE 636 W HCPCS: Performed by: ANESTHESIOLOGY

## 2020-07-07 PROCEDURE — 27447 PR TOTAL KNEE ARTHROPLASTY: ICD-10-PCS | Mod: LT,COE,, | Performed by: ORTHOPAEDIC SURGERY

## 2020-07-07 PROCEDURE — D9220A PRA ANESTHESIA: ICD-10-PCS | Mod: COE,,, | Performed by: ANESTHESIOLOGY

## 2020-07-07 PROCEDURE — 25000003 PHARM REV CODE 250: Performed by: STUDENT IN AN ORGANIZED HEALTH CARE EDUCATION/TRAINING PROGRAM

## 2020-07-07 PROCEDURE — 97165 OT EVAL LOW COMPLEX 30 MIN: CPT

## 2020-07-07 PROCEDURE — 71000039 HC RECOVERY, EACH ADD'L HOUR: Performed by: ORTHOPAEDIC SURGERY

## 2020-07-07 PROCEDURE — 36000710: Performed by: ORTHOPAEDIC SURGERY

## 2020-07-07 PROCEDURE — 64448 PR NERVE BLOCK INJ, ANES/STEROID, FEMORAL, CONT INFUSION, INCL IMAG GUIDANCE: ICD-10-PCS | Mod: 59,LT,COE, | Performed by: ANESTHESIOLOGY

## 2020-07-07 PROCEDURE — 76942 ECHO GUIDE FOR BIOPSY: CPT | Performed by: STUDENT IN AN ORGANIZED HEALTH CARE EDUCATION/TRAINING PROGRAM

## 2020-07-07 PROCEDURE — 36000711: Performed by: ORTHOPAEDIC SURGERY

## 2020-07-07 PROCEDURE — 99900035 HC TECH TIME PER 15 MIN (STAT)

## 2020-07-07 PROCEDURE — 27447 TOTAL KNEE ARTHROPLASTY: CPT | Mod: LT,COE,, | Performed by: ORTHOPAEDIC SURGERY

## 2020-07-07 PROCEDURE — 94770 HC EXHALED C02 TEST: CPT

## 2020-07-07 PROCEDURE — 25000003 PHARM REV CODE 250: Performed by: PHYSICIAN ASSISTANT

## 2020-07-07 PROCEDURE — C1751 CATH, INF, PER/CENT/MIDLINE: HCPCS | Performed by: STUDENT IN AN ORGANIZED HEALTH CARE EDUCATION/TRAINING PROGRAM

## 2020-07-07 PROCEDURE — 37000009 HC ANESTHESIA EA ADD 15 MINS: Performed by: ORTHOPAEDIC SURGERY

## 2020-07-07 PROCEDURE — 64450 NJX AA&/STRD OTHER PN/BRANCH: CPT | Mod: 59,LT,COE, | Performed by: ANESTHESIOLOGY

## 2020-07-07 PROCEDURE — 64450 NJX AA&/STRD OTHER PN/BRANCH: CPT | Performed by: STUDENT IN AN ORGANIZED HEALTH CARE EDUCATION/TRAINING PROGRAM

## 2020-07-07 PROCEDURE — 25000003 PHARM REV CODE 250: Performed by: NURSE ANESTHETIST, CERTIFIED REGISTERED

## 2020-07-07 PROCEDURE — C1776 JOINT DEVICE (IMPLANTABLE): HCPCS | Performed by: ORTHOPAEDIC SURGERY

## 2020-07-07 PROCEDURE — 25000003 PHARM REV CODE 250: Performed by: ORTHOPAEDIC SURGERY

## 2020-07-07 PROCEDURE — 27200750 HC INSULATED NEEDLE/ STIMUPLEX: Performed by: STUDENT IN AN ORGANIZED HEALTH CARE EDUCATION/TRAINING PROGRAM

## 2020-07-07 PROCEDURE — 97535 SELF CARE MNGMENT TRAINING: CPT

## 2020-07-07 PROCEDURE — 94761 N-INVAS EAR/PLS OXIMETRY MLT: CPT

## 2020-07-07 PROCEDURE — 97161 PT EVAL LOW COMPLEX 20 MIN: CPT

## 2020-07-07 PROCEDURE — 97116 GAIT TRAINING THERAPY: CPT

## 2020-07-07 PROCEDURE — 25000003 PHARM REV CODE 250: Performed by: ANESTHESIOLOGY

## 2020-07-07 PROCEDURE — C1713 ANCHOR/SCREW BN/BN,TIS/BN: HCPCS | Performed by: ORTHOPAEDIC SURGERY

## 2020-07-07 PROCEDURE — S0020 INJECTION, BUPIVICAINE HYDRO: HCPCS | Performed by: STUDENT IN AN ORGANIZED HEALTH CARE EDUCATION/TRAINING PROGRAM

## 2020-07-07 PROCEDURE — 71000033 HC RECOVERY, INTIAL HOUR: Performed by: ORTHOPAEDIC SURGERY

## 2020-07-07 PROCEDURE — 63600175 PHARM REV CODE 636 W HCPCS: Performed by: NURSE ANESTHETIST, CERTIFIED REGISTERED

## 2020-07-07 PROCEDURE — 63600175 PHARM REV CODE 636 W HCPCS: Performed by: PHYSICIAN ASSISTANT

## 2020-07-07 PROCEDURE — D9220A PRA ANESTHESIA: Mod: COE,,, | Performed by: NURSE ANESTHETIST, CERTIFIED REGISTERED

## 2020-07-07 PROCEDURE — 76942 ECHO GUIDE FOR BIOPSY: CPT | Mod: 26,COE,, | Performed by: ANESTHESIOLOGY

## 2020-07-07 PROCEDURE — 37000008 HC ANESTHESIA 1ST 15 MINUTES: Performed by: ORTHOPAEDIC SURGERY

## 2020-07-07 PROCEDURE — D9220A PRA ANESTHESIA: Mod: COE,,, | Performed by: ANESTHESIOLOGY

## 2020-07-07 PROCEDURE — D9220A PRA ANESTHESIA: ICD-10-PCS | Mod: COE,,, | Performed by: NURSE ANESTHETIST, CERTIFIED REGISTERED

## 2020-07-07 PROCEDURE — 76942 PR U/S GUIDANCE FOR NEEDLE GUIDANCE: ICD-10-PCS | Mod: 26,COE,, | Performed by: ANESTHESIOLOGY

## 2020-07-07 PROCEDURE — 64450 PR NERVE BLOCK INJ, ANES/STEROID, OTHER PERIPHERAL: ICD-10-PCS | Mod: 59,LT,COE, | Performed by: ANESTHESIOLOGY

## 2020-07-07 PROCEDURE — 27201423 OPTIME MED/SURG SUP & DEVICES STERILE SUPPLY: Performed by: ORTHOPAEDIC SURGERY

## 2020-07-07 PROCEDURE — 64448 NJX AA&/STRD FEM NRV NFS IMG: CPT | Mod: 59,LT,COE, | Performed by: ANESTHESIOLOGY

## 2020-07-07 PROCEDURE — 94799 UNLISTED PULMONARY SVC/PX: CPT

## 2020-07-07 PROCEDURE — 64448 NJX AA&/STRD FEM NRV NFS IMG: CPT | Performed by: STUDENT IN AN ORGANIZED HEALTH CARE EDUCATION/TRAINING PROGRAM

## 2020-07-07 DEVICE — COMP FEM POST STAB CEM SZ 3 LF: Type: IMPLANTABLE DEVICE | Site: KNEE | Status: FUNCTIONAL

## 2020-07-07 DEVICE — PATELLA TRIATHLON 33X9 SYMTRC: Type: IMPLANTABLE DEVICE | Site: KNEE | Status: FUNCTIONAL

## 2020-07-07 DEVICE — BASEPLATE TIB CEM PRIM SZ 3: Type: IMPLANTABLE DEVICE | Site: KNEE | Status: FUNCTIONAL

## 2020-07-07 DEVICE — TIBIAL POST STAB SZ 3 9X3 POLY: Type: IMPLANTABLE DEVICE | Site: KNEE | Status: FUNCTIONAL

## 2020-07-07 DEVICE — CEMENT BONE WHOLE BATCH: Type: IMPLANTABLE DEVICE | Site: KNEE | Status: FUNCTIONAL

## 2020-07-07 RX ORDER — ROPIVACAINE HYDROCHLORIDE 2 MG/ML
6 INJECTION, SOLUTION EPIDURAL; INFILTRATION; PERINEURAL CONTINUOUS
Status: DISCONTINUED | OUTPATIENT
Start: 2020-07-07 | End: 2020-07-08 | Stop reason: HOSPADM

## 2020-07-07 RX ORDER — PREGABALIN 150 MG/1
150 CAPSULE ORAL NIGHTLY
Status: DISCONTINUED | OUTPATIENT
Start: 2020-07-07 | End: 2020-07-08 | Stop reason: HOSPADM

## 2020-07-07 RX ORDER — ACETAMINOPHEN 500 MG
1000 TABLET ORAL
Status: COMPLETED | OUTPATIENT
Start: 2020-07-07 | End: 2020-07-07

## 2020-07-07 RX ORDER — CEFAZOLIN SODIUM 1 G/3ML
2 INJECTION, POWDER, FOR SOLUTION INTRAMUSCULAR; INTRAVENOUS
Status: COMPLETED | OUTPATIENT
Start: 2020-07-07 | End: 2020-07-08

## 2020-07-07 RX ORDER — AMOXICILLIN 250 MG
1 CAPSULE ORAL 2 TIMES DAILY
Status: DISCONTINUED | OUTPATIENT
Start: 2020-07-07 | End: 2020-07-08 | Stop reason: HOSPADM

## 2020-07-07 RX ORDER — FENTANYL CITRATE 50 UG/ML
25 INJECTION, SOLUTION INTRAMUSCULAR; INTRAVENOUS EVERY 5 MIN PRN
Status: DISCONTINUED | OUTPATIENT
Start: 2020-07-07 | End: 2020-07-07 | Stop reason: HOSPADM

## 2020-07-07 RX ORDER — OXYCODONE HYDROCHLORIDE 5 MG/1
5 TABLET ORAL
Status: DISCONTINUED | OUTPATIENT
Start: 2020-07-07 | End: 2020-07-08 | Stop reason: HOSPADM

## 2020-07-07 RX ORDER — FENTANYL CITRATE 50 UG/ML
25 INJECTION, SOLUTION INTRAMUSCULAR; INTRAVENOUS EVERY 5 MIN PRN
Status: COMPLETED | OUTPATIENT
Start: 2020-07-07 | End: 2020-07-07

## 2020-07-07 RX ORDER — CEFAZOLIN SODIUM 1 G/3ML
2 INJECTION, POWDER, FOR SOLUTION INTRAMUSCULAR; INTRAVENOUS
Status: COMPLETED | OUTPATIENT
Start: 2020-07-07 | End: 2020-07-07

## 2020-07-07 RX ORDER — NALOXONE HCL 0.4 MG/ML
0.02 VIAL (ML) INJECTION
Status: DISCONTINUED | OUTPATIENT
Start: 2020-07-07 | End: 2020-07-08 | Stop reason: HOSPADM

## 2020-07-07 RX ORDER — KETAMINE HCL IN 0.9 % NACL 50 MG/5 ML
SYRINGE (ML) INTRAVENOUS
Status: DISCONTINUED | OUTPATIENT
Start: 2020-07-07 | End: 2020-07-07

## 2020-07-07 RX ORDER — LIDOCAINE HYDROCHLORIDE 10 MG/ML
1 INJECTION, SOLUTION EPIDURAL; INFILTRATION; INTRACAUDAL; PERINEURAL
Status: DISCONTINUED | OUTPATIENT
Start: 2020-07-07 | End: 2020-07-07 | Stop reason: HOSPADM

## 2020-07-07 RX ORDER — MUPIROCIN 20 MG/G
1 OINTMENT TOPICAL
Status: COMPLETED | OUTPATIENT
Start: 2020-07-07 | End: 2020-07-07

## 2020-07-07 RX ORDER — ONDANSETRON 2 MG/ML
4 INJECTION INTRAMUSCULAR; INTRAVENOUS EVERY 8 HOURS PRN
Status: DISCONTINUED | OUTPATIENT
Start: 2020-07-07 | End: 2020-07-08 | Stop reason: HOSPADM

## 2020-07-07 RX ORDER — ROPIVACAINE HYDROCHLORIDE 2 MG/ML
5 INJECTION, SOLUTION EPIDURAL; INFILTRATION; PERINEURAL ONCE
Status: COMPLETED | OUTPATIENT
Start: 2020-07-07 | End: 2020-07-07

## 2020-07-07 RX ORDER — MORPHINE SULFATE 4 MG/ML
2 INJECTION, SOLUTION INTRAMUSCULAR; INTRAVENOUS
Status: DISCONTINUED | OUTPATIENT
Start: 2020-07-07 | End: 2020-07-08 | Stop reason: HOSPADM

## 2020-07-07 RX ORDER — POLYETHYLENE GLYCOL 3350 17 G/17G
17 POWDER, FOR SOLUTION ORAL DAILY
Status: DISCONTINUED | OUTPATIENT
Start: 2020-07-07 | End: 2020-07-08 | Stop reason: HOSPADM

## 2020-07-07 RX ORDER — PREGABALIN 75 MG/1
150 CAPSULE ORAL
Status: COMPLETED | OUTPATIENT
Start: 2020-07-07 | End: 2020-07-07

## 2020-07-07 RX ORDER — OXYCODONE HYDROCHLORIDE 10 MG/1
10 TABLET ORAL
Status: DISCONTINUED | OUTPATIENT
Start: 2020-07-07 | End: 2020-07-08 | Stop reason: HOSPADM

## 2020-07-07 RX ORDER — SODIUM CHLORIDE 9 MG/ML
INJECTION, SOLUTION INTRAVENOUS CONTINUOUS
Status: ACTIVE | OUTPATIENT
Start: 2020-07-07 | End: 2020-07-08

## 2020-07-07 RX ORDER — MUPIROCIN 20 MG/G
1 OINTMENT TOPICAL 2 TIMES DAILY
Status: DISCONTINUED | OUTPATIENT
Start: 2020-07-07 | End: 2020-07-08 | Stop reason: HOSPADM

## 2020-07-07 RX ORDER — SODIUM CHLORIDE 9 MG/ML
INJECTION, SOLUTION INTRAVENOUS
Status: COMPLETED | OUTPATIENT
Start: 2020-07-07 | End: 2020-07-07

## 2020-07-07 RX ORDER — DEXAMETHASONE SODIUM PHOSPHATE 4 MG/ML
INJECTION, SOLUTION INTRA-ARTICULAR; INTRALESIONAL; INTRAMUSCULAR; INTRAVENOUS; SOFT TISSUE
Status: DISCONTINUED | OUTPATIENT
Start: 2020-07-07 | End: 2020-07-07

## 2020-07-07 RX ORDER — CALCIUM CARBONATE 200(500)MG
500 TABLET,CHEWABLE ORAL
Status: DISCONTINUED | OUTPATIENT
Start: 2020-07-07 | End: 2020-07-08 | Stop reason: HOSPADM

## 2020-07-07 RX ORDER — CELECOXIB 200 MG/1
400 CAPSULE ORAL
Status: COMPLETED | OUTPATIENT
Start: 2020-07-07 | End: 2020-07-07

## 2020-07-07 RX ORDER — ASPIRIN 81 MG/1
81 TABLET ORAL ONCE
Status: COMPLETED | OUTPATIENT
Start: 2020-07-07 | End: 2020-07-07

## 2020-07-07 RX ORDER — MIDAZOLAM HYDROCHLORIDE 1 MG/ML
INJECTION, SOLUTION INTRAMUSCULAR; INTRAVENOUS
Status: DISCONTINUED | OUTPATIENT
Start: 2020-07-07 | End: 2020-07-07

## 2020-07-07 RX ORDER — PROPOFOL 10 MG/ML
VIAL (ML) INTRAVENOUS CONTINUOUS PRN
Status: DISCONTINUED | OUTPATIENT
Start: 2020-07-07 | End: 2020-07-07

## 2020-07-07 RX ORDER — VANCOMYCIN HCL IN 5 % DEXTROSE 1G/250ML
1000 PLASTIC BAG, INJECTION (ML) INTRAVENOUS
Status: COMPLETED | OUTPATIENT
Start: 2020-07-07 | End: 2020-07-07

## 2020-07-07 RX ORDER — PHENYLEPHRINE HYDROCHLORIDE 10 MG/ML
INJECTION INTRAVENOUS
Status: DISCONTINUED | OUTPATIENT
Start: 2020-07-07 | End: 2020-07-07

## 2020-07-07 RX ORDER — ONDANSETRON 2 MG/ML
INJECTION INTRAMUSCULAR; INTRAVENOUS
Status: DISCONTINUED | OUTPATIENT
Start: 2020-07-07 | End: 2020-07-07

## 2020-07-07 RX ORDER — MIDAZOLAM HYDROCHLORIDE 1 MG/ML
1 INJECTION INTRAMUSCULAR; INTRAVENOUS EVERY 5 MIN PRN
Status: DISCONTINUED | OUTPATIENT
Start: 2020-07-07 | End: 2020-07-07 | Stop reason: HOSPADM

## 2020-07-07 RX ORDER — LIDOCAINE HYDROCHLORIDE 20 MG/ML
INJECTION INTRAVENOUS
Status: DISCONTINUED | OUTPATIENT
Start: 2020-07-07 | End: 2020-07-07

## 2020-07-07 RX ORDER — BISACODYL 10 MG
10 SUPPOSITORY, RECTAL RECTAL EVERY 12 HOURS PRN
Status: DISCONTINUED | OUTPATIENT
Start: 2020-07-07 | End: 2020-07-08 | Stop reason: HOSPADM

## 2020-07-07 RX ORDER — TALC
6 POWDER (GRAM) TOPICAL NIGHTLY PRN
Status: DISCONTINUED | OUTPATIENT
Start: 2020-07-07 | End: 2020-07-07 | Stop reason: HOSPADM

## 2020-07-07 RX ORDER — FAMOTIDINE 20 MG/1
20 TABLET, FILM COATED ORAL 2 TIMES DAILY
Status: DISCONTINUED | OUTPATIENT
Start: 2020-07-07 | End: 2020-07-08 | Stop reason: HOSPADM

## 2020-07-07 RX ORDER — BUPIVACAINE HYDROCHLORIDE 5 MG/ML
INJECTION, SOLUTION EPIDURAL; INTRACAUDAL
Status: DISCONTINUED | OUTPATIENT
Start: 2020-07-07 | End: 2020-07-07

## 2020-07-07 RX ORDER — METHOCARBAMOL 500 MG/1
1000 TABLET, FILM COATED ORAL ONCE
Status: COMPLETED | OUTPATIENT
Start: 2020-07-07 | End: 2020-07-07

## 2020-07-07 RX ORDER — SODIUM CHLORIDE 0.9 % (FLUSH) 0.9 %
10 SYRINGE (ML) INJECTION
Status: DISCONTINUED | OUTPATIENT
Start: 2020-07-07 | End: 2020-07-07 | Stop reason: HOSPADM

## 2020-07-07 RX ORDER — LIDOCAINE HYDROCHLORIDE AND EPINEPHRINE 15; 5 MG/ML; UG/ML
INJECTION, SOLUTION EPIDURAL
Status: DISCONTINUED | OUTPATIENT
Start: 2020-07-07 | End: 2020-07-07

## 2020-07-07 RX ORDER — ACETAMINOPHEN 500 MG
1000 TABLET ORAL EVERY 6 HOURS
Status: DISCONTINUED | OUTPATIENT
Start: 2020-07-07 | End: 2020-07-08 | Stop reason: HOSPADM

## 2020-07-07 RX ORDER — TRIAMTERENE AND HYDROCHLOROTHIAZIDE 37.5; 25 MG/1; MG/1
1 CAPSULE ORAL DAILY
Status: DISCONTINUED | OUTPATIENT
Start: 2020-07-09 | End: 2020-07-08 | Stop reason: HOSPADM

## 2020-07-07 RX ADMIN — DOCUSATE SODIUM 50MG AND SENNOSIDES 8.6MG 1 TABLET: 8.6; 5 TABLET, FILM COATED ORAL at 09:07

## 2020-07-07 RX ADMIN — SODIUM CHLORIDE: 0.9 INJECTION, SOLUTION INTRAVENOUS at 11:07

## 2020-07-07 RX ADMIN — PROPOFOL 125 MCG/KG/MIN: 10 INJECTION, EMULSION INTRAVENOUS at 08:07

## 2020-07-07 RX ADMIN — Medication 20 MG: at 08:07

## 2020-07-07 RX ADMIN — ACETAMINOPHEN 1000 MG: 500 TABLET ORAL at 12:07

## 2020-07-07 RX ADMIN — FAMOTIDINE 20 MG: 20 TABLET, FILM COATED ORAL at 08:07

## 2020-07-07 RX ADMIN — MIDAZOLAM HYDROCHLORIDE 1 MG: 1 INJECTION, SOLUTION INTRAMUSCULAR; INTRAVENOUS at 07:07

## 2020-07-07 RX ADMIN — MIDAZOLAM 2 MG: 1 INJECTION INTRAMUSCULAR; INTRAVENOUS at 08:07

## 2020-07-07 RX ADMIN — SODIUM CHLORIDE, SODIUM GLUCONATE, SODIUM ACETATE, POTASSIUM CHLORIDE, MAGNESIUM CHLORIDE, SODIUM PHOSPHATE, DIBASIC, AND POTASSIUM PHOSPHATE: .53; .5; .37; .037; .03; .012; .00082 INJECTION, SOLUTION INTRAVENOUS at 10:07

## 2020-07-07 RX ADMIN — FENTANYL CITRATE 25 MCG: 50 INJECTION INTRAMUSCULAR; INTRAVENOUS at 02:07

## 2020-07-07 RX ADMIN — PREGABALIN 150 MG: 150 CAPSULE ORAL at 08:07

## 2020-07-07 RX ADMIN — DEXAMETHASONE SODIUM PHOSPHATE 8 MG: 4 INJECTION, SOLUTION INTRAMUSCULAR; INTRAVENOUS at 08:07

## 2020-07-07 RX ADMIN — CEFAZOLIN 2 G: 330 INJECTION, POWDER, FOR SOLUTION INTRAMUSCULAR; INTRAVENOUS at 08:07

## 2020-07-07 RX ADMIN — CELECOXIB 400 MG: 200 CAPSULE ORAL at 07:07

## 2020-07-07 RX ADMIN — FENTANYL CITRATE 25 MCG: 50 INJECTION INTRAMUSCULAR; INTRAVENOUS at 01:07

## 2020-07-07 RX ADMIN — MUPIROCIN 1 G: 20 OINTMENT TOPICAL at 09:07

## 2020-07-07 RX ADMIN — PHENYLEPHRINE HYDROCHLORIDE 100 MCG: 10 INJECTION INTRAVENOUS at 08:07

## 2020-07-07 RX ADMIN — PHENYLEPHRINE HYDROCHLORIDE 0.25 MCG/KG/MIN: 10 INJECTION INTRAVENOUS at 09:07

## 2020-07-07 RX ADMIN — ROPIVACAINE HYDROCHLORIDE 6 ML/HR: 2 INJECTION, SOLUTION EPIDURAL; INFILTRATION at 11:07

## 2020-07-07 RX ADMIN — POLYETHYLENE GLYCOL (3350) 17 G: 17 POWDER, FOR SOLUTION ORAL at 03:07

## 2020-07-07 RX ADMIN — ASPIRIN 81 MG: 81 TABLET, COATED ORAL at 09:07

## 2020-07-07 RX ADMIN — MUPIROCIN 1 G: 20 OINTMENT TOPICAL at 07:07

## 2020-07-07 RX ADMIN — VANCOMYCIN HYDROCHLORIDE 1000 MG: 1 INJECTION, POWDER, LYOPHILIZED, FOR SOLUTION INTRAVENOUS at 07:07

## 2020-07-07 RX ADMIN — ACETAMINOPHEN 1000 MG: 500 TABLET ORAL at 07:07

## 2020-07-07 RX ADMIN — FENTANYL CITRATE 25 MCG: 50 INJECTION INTRAMUSCULAR; INTRAVENOUS at 11:07

## 2020-07-07 RX ADMIN — FENTANYL CITRATE 50 MCG: 50 INJECTION INTRAMUSCULAR; INTRAVENOUS at 07:07

## 2020-07-07 RX ADMIN — ROPIVACAINE HYDROCHLORIDE 5 ML: 2 INJECTION, SOLUTION EPIDURAL; INFILTRATION at 01:07

## 2020-07-07 RX ADMIN — MORPHINE SULFATE 2 MG: 4 INJECTION INTRAVENOUS at 01:07

## 2020-07-07 RX ADMIN — CEFAZOLIN 2 G: 330 INJECTION, POWDER, FOR SOLUTION INTRAMUSCULAR; INTRAVENOUS at 03:07

## 2020-07-07 RX ADMIN — PHENYLEPHRINE HYDROCHLORIDE 100 MCG: 10 INJECTION INTRAVENOUS at 09:07

## 2020-07-07 RX ADMIN — SODIUM CHLORIDE, SODIUM GLUCONATE, SODIUM ACETATE, POTASSIUM CHLORIDE, MAGNESIUM CHLORIDE, SODIUM PHOSPHATE, DIBASIC, AND POTASSIUM PHOSPHATE: .53; .5; .37; .037; .03; .012; .00082 INJECTION, SOLUTION INTRAVENOUS at 09:07

## 2020-07-07 RX ADMIN — METHOCARBAMOL TABLETS 1000 MG: 500 TABLET, COATED ORAL at 12:07

## 2020-07-07 RX ADMIN — ROPIVACAINE HYDROCHLORIDE 6 ML/HR: 2 INJECTION, SOLUTION EPIDURAL; INFILTRATION at 09:07

## 2020-07-07 RX ADMIN — FENTANYL CITRATE 25 MCG: 50 INJECTION INTRAMUSCULAR; INTRAVENOUS at 12:07

## 2020-07-07 RX ADMIN — BUPIVACAINE HYDROCHLORIDE 20 ML: 5 INJECTION, SOLUTION EPIDURAL; INTRACAUDAL; PERINEURAL at 07:07

## 2020-07-07 RX ADMIN — PREGABALIN 150 MG: 75 CAPSULE ORAL at 07:07

## 2020-07-07 RX ADMIN — OXYCODONE HYDROCHLORIDE 10 MG: 10 TABLET ORAL at 11:07

## 2020-07-07 RX ADMIN — SODIUM CHLORIDE: 0.9 INJECTION, SOLUTION INTRAVENOUS at 07:07

## 2020-07-07 RX ADMIN — LIDOCAINE HYDROCHLORIDE,EPINEPHRINE BITARTRATE 5 ML: 15; .005 INJECTION, SOLUTION EPIDURAL; INFILTRATION; INTRACAUDAL; PERINEURAL at 10:07

## 2020-07-07 RX ADMIN — LIDOCAINE HYDROCHLORIDE 100 MG: 20 INJECTION, SOLUTION INTRAVENOUS at 08:07

## 2020-07-07 RX ADMIN — OXYCODONE HYDROCHLORIDE 10 MG: 10 TABLET ORAL at 03:07

## 2020-07-07 RX ADMIN — ONDANSETRON 4 MG: 2 INJECTION INTRAMUSCULAR; INTRAVENOUS at 08:07

## 2020-07-07 NOTE — ANESTHESIA PREPROCEDURE EVALUATION
07/07/2020  Mirna Eason is a 65 y.o., female presents for Left TKA.      Patient Active Problem List   Diagnosis    Meniere disease    Edema    Anemia    Blindness of left eye    Status post total right knee replacement 10/22/2019    Acid reflux    History of tobacco use    Macrocytosis    Constipation    History of TIA (transient ischemic attack)    Fracture- history of Left foot fracture     History of appendectomy    Family history of heart disease    Family history of diabetes mellitus    Status post total left knee replacement 7/7/2020    Over weight    History of thrombosis    History of stress test    Arrhythmia    Primary osteoarthritis of left knee       Past Surgical History:   Procedure Laterality Date    appendectomy  1980's    had ovary, tube removed for tubal pregnancy     CHOLECYSTECTOMY      fracture surgery- fell down thestairs- [facility] - 80's      mechanical fall     JOINT REPLACEMENT      LAPAROSCOPIC CHOLECYSTECTOMY  2009/2010    oophorectomy, [facility]pix- Rt - 1980's      TOTAL KNEE ARTHROPLASTY Right 10/22/2019    Procedure: ARTHROPLASTY, KNEE, TOTAL-[facility];  Surgeon: Srikanth Jones MD;  Location: [facility];  Service: Orthopedics;  Laterality: Right;        Tobacco Use:  The patient  reports that she quit smoking about 13 years ago. She quit after 15.00 years of use. She has never used smokeless tobacco.     Results for orders placed or performed during the hospital encounter of 07/06/20   SCHEDULED EKG 12-LEAD (to Muse)    Collection Time: 07/06/20  1:11 PM    Narrative    Test Reason : I49.9,    Vent. Rate : 073 BPM     Atrial Rate : 073 BPM     P-R Int : 144 ms          QRS Dur : 118 ms      QT Int : 420 ms       P-R-T Axes : 043 -11 026 degrees     QTc Int : 462 ms    Normal sinus rhythm  Nonspecific intraventricular conduction delay  Borderline Abnormal  ECG  No previous ECGs available  Confirmed by Ijeoma BRANNON, Ori ([id number]) on 7/6/2020 5:55:50 PM    Referred By: LEIGH GRANGER           Confirmed By:Ori Raphael MD             Lab Results   Component Value Date    WBC 6.79 07/06/2020    HGB 12.6 07/06/2020    HCT 38.9 07/06/2020     (H) 07/06/2020     07/06/2020     BMP  Lab Results   Component Value Date     07/06/2020    K 3.5 07/06/2020    CL 98 07/06/2020    CO2 28 07/06/2020    BUN 13 07/06/2020    CREATININE 0.9 07/06/2020    CALCIUM 9.7 07/06/2020    ANIONGAP 11 07/06/2020     07/06/2020    GLU 94 10/21/2019       No results found for this or any previous visit.          Anesthesia Evaluation    I have reviewed the Patient Summary Reports.    I have reviewed the Nursing Notes. I have reviewed the NPO Status.      Review of Systems  Anesthesia Hx:  No problems with previous Anesthesia    Social:  Non-Smoker, No Alcohol Use    Hematology/Oncology:  Hematology Normal   Oncology Normal     EENT/Dental:EENT/Dental Normal   Cardiovascular:  Cardiovascular Normal     Pulmonary:  Pulmonary Normal    Renal/:  Renal/ Normal     Neurological:  Neurology Normal    Endocrine:  Endocrine Normal    Dermatological:  Skin Normal    Psych:  Psychiatric Normal           Physical Exam  General:  Well nourished    Airway/Jaw/Neck:  Airway Findings: Mouth Opening: Normal Tongue: Normal  General Airway Assessment: Adult  Mallampati: III  Improves to II with phonation.  TM Distance: Normal, at least 6 cm  Jaw/Neck Findings:     Neck ROM: Normal ROM  Neck Findings:     Eyes/Ears/Nose:  Eyes/Ears/Nose Findings: (blind in left eye)    Dental:  Dental Findings: In tact   Chest/Lungs:  Chest/Lungs Findings: Clear to auscultation, Normal Respiratory Rate     Heart/Vascular:  Heart Findings: Rate: Normal  Rhythm: Regular Rhythm  Sounds: Normal        Mental Status:  Mental Status Findings:  Cooperative, Alert and Oriented         Anesthesia Plan  Type of  Anesthesia, risks & benefits discussed:  Anesthesia Type:  regional, general, CSE  Patient's Preference:   Intra-op Monitoring Plan: standard ASA monitors  Intra-op Monitoring Plan Comments:   Post Op Pain Control Plan: per primary service following discharge from PACU, IV/PO Opioids PRN, multimodal analgesia and peripheral nerve block  Post Op Pain Control Plan Comments:   Induction:   IV  Beta Blocker:  Patient is not currently on a Beta-Blocker (No further documentation required).       Informed Consent: Patient understands risks and agrees with Anesthesia plan.  Questions answered. Anesthesia consent signed with patient.  ASA Score: 3     Day of Surgery Review of History & Physical: I have interviewed and examined the patient. I have reviewed the patient's H&P dated:            Ready For Surgery From Anesthesia Perspective.

## 2020-07-07 NOTE — ANESTHESIA PROCEDURE NOTES
CSE    Patient location during procedure: OR  Start time: 7/7/2020 8:23 AM  Timeout: 7/7/2020 8:22 AM  End time: 7/7/2020 8:29 AM    Staffing  Authorizing Provider: Nando Mccormick MD  Performing Provider: Nando Mccormick MD    Preanesthetic Checklist  Completed: patient identified, site marked, surgical consent, pre-op evaluation, timeout performed, IV checked, risks and benefits discussed and monitors and equipment checked  CSE  Patient position: sitting  Prep: ChloraPrep  Patient monitoring: heart rate, cardiac monitor, continuous pulse ox, continuous capnometry and frequent blood pressure checks  Approach: midline  Spinal Needle  Needle type: pencil-tip   Needle gauge: 25 G  Needle length: 4 in  Epidural Needle  Injection technique: ACE air  Needle type: Tuohy   Needle gauge: 17 G  Needle length: 3.5 in  Needle insertion depth: 5 cm  Location: L4-5  Needle localization: anatomical landmarks  Catheter  Catheter type: springwound  Catheter size: 19 G  Catheter at skin depth: 15 cm  Test dose: lidocaine 1.5% with Epi 1-to-200,000  Additional Documentation: incremental injection, negative aspiration for CSF, negative aspiration for heme, no paresthesia on injection and negative test dose  Assessment  Sensory level: T10   Dermatomal levels determined by alcohol swab  Intrathecal Medications:  administered: primary anesthetic mcg of    Additional Notes  Spinal dosed with 1.5% Mepivacaine 3 cc.

## 2020-07-07 NOTE — BRIEF OP NOTE
C/O severe pain  Vitals Stable  Dressing Dry/Intact  Bilateral lower extremities: Palpable DP, good capillary refill  Motor sensation intact  xrays taken today demonstrate a well fixed and positioned TKA.  S/P TKA  Work on pain control..

## 2020-07-07 NOTE — PLAN OF CARE
Patient tolerated PT session fairly well. She ambulated 60ft with RW and CGA. She was limited in ambulation distance due to pain.     Problem: Physical Therapy Goal  Goal: Physical Therapy Goal  Description: Goals to be met by: 2020    Patient will increase functional independence with mobility by performin. Supine to sit with supervision  2. Sit to stand transfer with Supervision  3. Gait x200 feet with Supervision using Rolling Walker  4. Ascend/Descend 6 inch curb step with Stand-by Assistance using Rolling Walker  5. Lower extremity exercise program x30 reps per handout, with supervision    Outcome: Ongoing, Progressing

## 2020-07-07 NOTE — ANESTHESIA PROCEDURE NOTES
Ipack single shot    Patient location during procedure: pre-op   Block not for primary anesthetic.  Reason for block: at surgeon's request and post-op pain management   Post-op Pain Location: left knee  Start time: 7/7/2020 7:45 AM  Timeout: 7/7/2020 7:44 AM   End time: 7/7/2020 7:46 AM    Staffing  Authorizing Provider: Nando Mccormick MD  Performing Provider: Steve Andrea MD    Preanesthetic Checklist  Completed: patient identified, site marked, surgical consent, pre-op evaluation, timeout performed, IV checked, risks and benefits discussed and monitors and equipment checked  Peripheral Block  Patient position: supine  Prep: ChloraPrep  Patient monitoring: heart rate, cardiac monitor, continuous pulse ox, continuous capnometry and frequent blood pressure checks  Block type: I PACK  Laterality: left  Injection technique: single shot  Needle  Needle type: Stimuplex   Needle gauge: 21 G  Needle length: 4 in  Needle localization: anatomical landmarks and ultrasound guidance   -ultrasound image captured on disc.  Assessment  Injection assessment: negative aspiration, negative parasthesia and local visualized surrounding nerve  Paresthesia pain: none  Heart rate change: no  Slow fractionated injection: yes  Additional Notes  VSS.  [facility] RN monitoring vitals throughout procedure.  Patient tolerated procedure well.

## 2020-07-07 NOTE — PROGRESS NOTES
"Report received from Fanny MICHAEL.  Dr. Pérez at bedside.  Patient reports pain 10/10 to "entire" left knee.  TORB fentanyl 25 mcg every 5 mins prn ivp x 4 doses.  Dr. Jones at bedside.  "

## 2020-07-07 NOTE — OP NOTE
DATE OF PROCEDURE: 7/7/2020  PREOPERATIVE DIAGNOSIS: Arthritis, Left knee.   POSTOPERATIVE DIAGNOSIS: Arthritis, Left knee.   PROCEDURES PERFORMED: Left total knee arthroplasty.   SURGEON: Srikanth Jones M.D.   ASSISTANT: BUDDY Riley MD  ANESTHESIA: Regional.   COMPLICATIONS: None.   COUNTS: Correct.   DISPOSITION: Recovery Room, stable.   SPECIMENS: Bone and cartilage.   FINDINGS: Tricompartmental degenerative change.   FLUIDS: 2000 mL.   BLOOD LOSS: Less than 50 mL.   IMPLANTS: [employer], size 3 Left posterior stabilized   cemented femoral component with a 3 cemented tibial tray, a 33 mm 3-peg   patella and a size 3, 9 mm posterior stabilized polyethylene insert.   INDICATIONS FOR PROCEDURE: Mirna Eason is a 65-year-old female who has   severe arthritis in the Left knee . She is having continued pain in the   Left knee and did not respond to nonoperative measures, decided to undergo   Left knee replacement. She is aware of reasonable treatment options as   well as risks and benefits. {She did not respond to NSAIDS or injections. She received a course or pre-operative physical therapy.  PROCEDURE IN DETAIL: After appropriate consent was obtained, the patient   Was brought in the Operating Room, anesthesia was administered. She received   antibiotic prophylaxis. Cast   padding and tourniquet was applied to the proximal Left thigh. Left  lower extremity was prepped and draped in usual sterile fashion. Limb was   elevated, tourniquet was inflated. The knee was flexed. An incision was   made from the tibial tubercle just proximal to the superior pole of the   patella. It was taken down through the skin and retinacular. A medial   parapatellar arthrotomy was performed followed by a standard medial   release. Patellar fat pad was partially excised. ACL was resected.   Femoral punch was used to make the guide hole for the femoral drill. The   distal cutting guide was set at 6 degrees valgus left. A standard  distal femoral cut was made.We were pleased with the alignment and quality of the cut.  The PCL retractor was used to bring   the tibia into the field. Meniscal remnants were resected. The   extramedullary tibial guide was pinned in place using the lateral side, as most   defective, 2 mm bone was taken off of this. We checked the alignment in   both planes both before and after making the cut. We were satisfied with the   alignment of the cut and the amount of bone removed.The femur was then  sized, found to be a size 3. A size 3 cutting guide was pinned in 3   degrees of external rotation. This was based off the posterior condyle,   checked the transepicondylar axis. Anterior, posterior and chamfer cuts   were made. The box guide was pinned in position. Femoral box cut was   made. Bone fragments were removed. Lamina spreaders were   then used to open up posteriorly. The PCL was resected and some soft   tissue debris was removed.  A 9 mm spacer block gave excellent flexion-extension gap balance.   I was also satisfied with the medial and lateral stability. At this   point, the femoral trial was placed. The tibia was sized, found to be a   Size 3. A size 3 tibial trial was pinned in appropriate alignment and   rotation. This was based on the medial one third of the tibial tubercle.  A stem extension hole was drilled. A keel hole was punched and a   trial reduction was performed with a size 3, 9 mm insert. She  achieved full   extension. There was no recurvatum. She easily had 130 degrees of flexion.   The femoral component ranged symmetrically in the tibial tray. There was   no lift off. There was no instability of full extension, 30 degrees of   flexion, mid flexion and full flexion. Patella was measured and found to   be 24 mm thick, 8 mm of bone removed. The 3-peg holes were drilled 33 mm   3-peg trial was placed. The knee was brought through range of motion. The   patella tracked extremely well. Therefore, at  this point, we were   satisfied with knee range of motion and stability, component position,   sizing and alignment as well as patella tracking. All trial components   were removed. Bone was prepared for cementing by pulsatile lavage and   drying. Components were cemented into place, femur followed by tibia.   Meticulous care was taken to remove excess cement. Tibial insert was   firmly seated in the tibial tray. The knee was inspected. There was no   loose body, foreign body or soft tissue interposition. Knee was then   reduced, brought into extension. Patella button was cemented in place.   Excess cement was removed. The wound was then copiously irrigated with   antibiotic-impregnated solution. Once the cement was dried, tranexamic   acid was applied. The arthrotomy was closed with #1 Vicryl. Once the   arthrotomy was closed, the knee was brought through range of motion, stable   as previously described. Patella tracked very well. Retinacular was   closed with 0 Vicryl, subcutaneous layer with 2-0 Vicryl, skin with   monocryl and dermabond. Sterile dressing was applied. Tourniquet was let down.  She was   transferred to a stretcher, brought to Recovery Room in stable condition,   tolerated the procedure well, no known complications.

## 2020-07-07 NOTE — PROGRESS NOTES
Dr. Mccormick notified re: pt bp, no new orders as it is within 20% of preop bp.  OK to admin pain medication per pacu/post op orders for pain 10/10 to medial left knee.

## 2020-07-07 NOTE — PLAN OF CARE
Pre-op complete, all questions answered, pt is stable and ready for next phase of care. Pt states she has a walker. Pt's belongings are with her sister.

## 2020-07-07 NOTE — PROGRESS NOTES
Acute Pain Service and Perioperative [facility] Home Progress Note    HPI  Mirna Eason is a 65 y.o., female. No events overnight.     Interval history      Surgery:  Procedure(s) (LRB):  ARTHROPLASTY, KNEE - [facility] (Left)    Post Op Day #: 1    Catheter type: Perineural Adductor Canal    Infusion type: Ropivacaine 0.2%  6 ml/hr basal    Problem List:    Active Hospital Problems    Diagnosis  POA    *Primary osteoarthritis of left knee s/p TKA 7/7/20 [M17.12]  Yes    Over weight [E66.3]  Yes    History of thrombosis [Z86.718]  Not Applicable    History of TIA (transient ischemic attack) [Z86.73]  Not Applicable    Acid reflux [K21.9]  Yes    Anemia [D64.9]  Yes      Resolved Hospital Problems   No resolved problems to display.       Subjective:       General appearance of alert, oriented, no complaints   Pain with rest: 3    Numbers   Pain with movement: 6    Numbers   Side Effects    1. Pruritis No    2. Nausea No    3. Motor Blockade No, 0=Ability to raise lower extremities off bed    4. Sedation No, 1=awake and alert    Schedule Medications:    acetaminophen  1,000 mg Oral Q6H    apixaban  2.5 mg Oral BID    famotidine  20 mg Oral BID    mupirocin  1 g Nasal BID    polyethylene glycol  17 g Oral Daily    pregabalin  150 mg Oral QHS    senna-docusate 8.6-50 mg  1 tablet Oral BID    [START ON 7/9/2020] triamterene-hydrochlorothiazide 37.5-25 mg  1 capsule Oral Daily        Continuous Infusions:   sodium chloride 0.9% 150 mL/hr at 07/07/20 2300    ropivacaine (PF) 2 mg/ml (0.2%)      ropivacaine (PF) 2 mg/ml (0.2%) 6 mL/hr (07/07/20 1100)    ropivacaine          PRN Medications:  bisacodyL, calcium carbonate, morphine, naloxone, ondansetron, oxyCODONE, oxyCODONE, promethazine (PHENERGAN) IVPB, promethazine (PHENERGAN) IVPB, ropivacaine       Antibiotics:  Antibiotics (From admission, onward)    Start     Stop Route Frequency Ordered    07/07/20 2100  mupirocin 2 % ointment 1 g      07/12 2059  Nasl 2 times daily 07/07/20 1440             Objective:     Catheter site clean, dry, intact          Vital Signs (Most Recent):  Temp: 97.6 °F (36.4 °C) (07/08/20 0417)  Pulse: 68 (07/08/20 0417)  Resp: 16 (07/08/20 0417)  BP: (!) 96/50 (07/08/20 0417)  SpO2: 99 % (07/08/20 0417) Vital Signs Range (Last 24H):  Temp:  [97 °F (36.1 °C)-98.3 °F (36.8 °C)]   Pulse:  [68-83]   Resp:  [12-26]   BP: ()/(46-73)   SpO2:  [94 %-100 %]          I & O (Last 24H):    Intake/Output Summary (Last 24 hours) at 7/8/2020 0514  Last data filed at 7/7/2020 1207  Gross per 24 hour   Intake 2654.2 ml   Output --   Net 2654.2 ml       Physical Exam:    GA: Alert, comfortable, no acute distress.   Pulmonary: Clear to auscultation A/P/L. No wheezing, crackles, or rhonchi.  Cardiac: RRR S1 & S2 w/o rubs/murmurs/gallops.   Abdominal:Bowel sounds present. No tenderness to palpation or distension. No appreciable hepatosplenomegaly.   Skin: No jaundice, rashes, or visible lesions.         Laboratory:  CBC:   Recent Labs     07/06/20  1123   WBC 6.79   RBC 3.86*   HGB 12.6   HCT 38.9      *   MCH 32.6*   MCHC 32.4       BMP:   Recent Labs     07/06/20  1123      K 3.5   CO2 28   CL 98   BUN 13   CREATININE 0.9      CALCIUM 9.7       Recent Labs     07/06/20  1123   INR 0.9         Anticoagulant dose ASA 81mg at 2100.    Assessment:         Pain control adequate    Plan:     1) Pain:    Adductor canal perineural catheter in place and infusing. Good level. Multimodal pain regimen with acetaminophen, Celebrex, Lyrica, and prn oxycodone given  Will continue to monitor. Plan to discharge with On-Q today 2) Meniere's/Edema: Continue home medications.    3) Anemia: No acute bleeding events overnight.    4) History of TIA and Thrombosis: eliquis ordered for post -op.   5) FEN/GI: tolerating a regular diet (+) flatus. (-) BM.   6) Dispo: Pt working well with PT/OT. Case management and SW for HH and PT. Plan for discharge  today/          Evaluator MARIELOS Muhammad      I have reviewed and concur with the nurse practitioner's history, physical, assessment, and plan.  I have personally interviewed and examined the patient at bedside.  See below addendum for my evaluation and additional findings.    Patient dressed and working with PT upon exam - no events overnight - vitals and labs are stable - she is tolerating a regular diet - PNC dressing reinforced - minor leaking aroudn site but dressing in tact - will plan to discharge today with OnQ to [facility].

## 2020-07-07 NOTE — ANESTHESIA PROCEDURE NOTES
Adductor catheter    Patient location during procedure: pre-op   Block not for primary anesthetic.  Reason for block: at surgeon's request and post-op pain management   Post-op Pain Location: left knee  Start time: 7/7/2020 7:33 AM  Timeout: 7/7/2020 7:31 AM   End time: 7/7/2020 7:44 AM    Staffing  Authorizing Provider: Nando Mccormick MD  Performing Provider: Steve Andrea MD    Preanesthetic Checklist  Completed: patient identified, site marked, surgical consent, pre-op evaluation, timeout performed, IV checked, risks and benefits discussed and monitors and equipment checked  Peripheral Block  Patient position: supine  Prep: ChloraPrep and site prepped and draped  Patient monitoring: heart rate, cardiac monitor, continuous pulse ox, continuous capnometry and frequent blood pressure checks  Block type: adductor canal  Laterality: left  Injection technique: continuous  Needle  Needle type: Tuohy   Needle gauge: 17 G  Needle length: 3.5 in  Needle localization: anatomical landmarks and ultrasound guidance  Catheter type: spring wound  Catheter size: 19 G  Test dose: lidocaine 1.5% with Epi 1-to-200,000 and negative   -ultrasound image captured on disc.  Assessment  Injection assessment: negative aspiration, negative parasthesia and local visualized surrounding nerve  Paresthesia pain: none  Heart rate change: no  Slow fractionated injection: yes  Additional Notes  VSS.  [facility] RN monitoring vitals throughout procedure.  Patient tolerated procedure well.

## 2020-07-07 NOTE — ASSESSMENT & PLAN NOTE
65 y.o. female s/p L TKA 7/7/20    VS:  Stable   Nerve block:  Adductor PNC; periop CSE, IPACK  PT/OT:  WBAT  DVT PPx:  Eliquis 2.5 BID, FCDs  Cultures:  none  Abx:  periop Ancef  Labs:  none  Drain:  none  [place]:  none    Dispo: likely home today; OP PT set up 7/9/20

## 2020-07-07 NOTE — PT/OT/SLP EVAL
Physical Therapy Evaluation and Treatment     Patient Name:  Mirna Eason   MRN:  [id number]    Recommendations:     Discharge Recommendations:  outpatient PT(7/9/2020)   Discharge Equipment Recommendations: none   Barriers to discharge: staying in the [facility] till next Monday then will drive back to [place]    Assessment:     Mirna Eason is a 65 y.o. female admitted with a medical diagnosis of Primary osteoarthritis of left knee.  She presents with the following impairments/functional limitations:  weakness, impaired functional mobilty, gait instability, impaired balance, decreased lower extremity function, pain, decreased ROM, impaired skin, orthopedic precautions. Patient tolerated PT session fairly well. She ambulated 60ft with RW and CGA. She was limited in ambulation distance due to pain.     Rehab Prognosis: Good; patient would benefit from acute skilled PT services to address these deficits and reach maximum level of function.    Recent Surgery: Procedure(s) (LRB):  ARTHROPLASTY, KNEE - [facility] (Left) Day of Surgery    Plan:     During this hospitalization, patient to be seen daily to address the identified rehab impairments via gait training, therapeutic activities, therapeutic exercises and progress toward the following goals:    · Plan of Care Expires:  07/09/20    Subjective     Chief Complaint: Pain in left knee.   Patient/Family Comments/goals: To walk without pain.   Pain/Comfort:  · Pain Rating 1: 6/10  · Location - Side 1: Left  · Location - Orientation 1: generalized  · Location 1: knee  · Pain Addressed 1: Pre-medicate for activity, Distraction, Cessation of Activity, Nurse notified    Living Environment:  Patient lives alone in a [facility] with 2 steps and a left handrail to enter.   Prior to admission, patients level of function was independent for functional mobility. Equipment used at home: raised toilet, walker, rolling, shower chair. R TKA 10/22/2019. Upon discharge, patient will have  assistance from sister.    Objective:     Communicated with RN prior to session.  Patient found supine with FCD, cryotherapy, perineural catheter, peripheral IV  upon PT entry to room.    General Precautions: Standard, fall   Orthopedic Precautions:LLE weight bearing as tolerated   Braces: N/A     Exams:  · Cognitive Exam:  Patient is oriented to Person, Place, Time and Situation  · Sensation:    · -       Intact  · RLE ROM: WFL  · RLE Strength: WFL  · LLE ROM: WFL at hip and ankle but limited at knee due to pain  · LLE Strength: grossly 3+/5 but did not formally assess due to pain    Functional Mobility:  · Bed Mobility:     · Scooting: stand by assistance  · Supine to Sit: stand by assistance  · Transfers:     · Sit to Stand:  contact guard assistance with rolling walker  · Gait:  Patient ambulated 60ft with Rolling Walker and CGA using 3-point gait. Patient demonstrated decreased ari and decreased step length during gait due to pain, decreased ROM and decreased strength.     Therapeutic Activities and Exercises:   Patient educated in:  -PT role and POC  -safety with transfers including hand placement  -gait sequencing and RW management  -OOB activity to maximize recovery       AM-PAC 6 CLICK MOBILITY  Total Score:17     Patient left up in chair with all lines intact, call button in reach, RN notified and sister present.    GOALS:   Multidisciplinary Problems     Physical Therapy Goals        Problem: Physical Therapy Goal    Goal Priority Disciplines Outcome Goal Variances Interventions   Physical Therapy Goal     PT, PT/OT Ongoing, Progressing     Description: Goals to be met by: 2020    Patient will increase functional independence with mobility by performin. Supine to sit with supervision  2. Sit to stand transfer with Supervision  3. Gait x200 feet with Supervision using Rolling Walker  4. Ascend/Descend 6 inch curb step with Stand-by Assistance using Rolling Walker  5. Lower extremity exercise  program x30 reps per handout, with supervision                     History:     Past Medical History:   Diagnosis Date    Arthritis     GERD (gastroesophageal reflux disease)     Meniere disease, left        Past Surgical History:   Procedure Laterality Date    appendectomy  1980's    had ovary, tube removed for tubal pregnancy     CHOLECYSTECTOMY      fracture surgery- fell down thestairs- Ankle - 80's      mechanical fall     JOINT REPLACEMENT      LAPAROSCOPIC CHOLECYSTECTOMY  2009/2010    oophorectomy, [facility]- Rt - 1980's      TOTAL KNEE ARTHROPLASTY Right 10/22/2019    Procedure: ARTHROPLASTY, KNEE, TOTAL-[facility];  Surgeon: Srikanth Jones MD;  Location: [facility];  Service: Orthopedics;  Laterality: Right;       Time Tracking:     PT Received On: 07/07/20  PT Start Time: 1512     PT Stop Time: 1530  PT Total Time (min): 18 min     Billable Minutes: Evaluation  8  and Gait Training 10      Carlee Dill, PT  07/07/2020

## 2020-07-07 NOTE — PLAN OF CARE
Problem: Occupational Therapy Goal  Goal: Occupational Therapy Goal  Description: Goals to be met by: 7-14-20    Patient will increase functional independence with ADLs by performing:    UE Dressing with [place].  LE Dressing with Modified [place].  Grooming while standing at sink with Modified [place].  Toileting from toilet with Modified [place] for hygiene and clothing management.   Toilet transfer to toilet with Modified [place].    Outcome: Ongoing, Progressing    OT evaluation with goals established. Contact guard assistance for toilet/grooming task.     Alize Amin, OT  7/7/2020

## 2020-07-07 NOTE — PROGRESS NOTES
Patient medicated per TORB with 50 mcg fentanyl and reports improvement in pain to 6/10 and tolerable.

## 2020-07-07 NOTE — PT/OT/SLP EVAL
"Occupational Therapy   Evaluation    Name: Mirna Eason  MRN: [id number]  Admitting Diagnosis:  Primary osteoarthritis of left knee Day of Surgery    Recommendations:     Discharge Recommendations: home  Discharge Equipment Recommendations:  none  Barriers to discharge:       Assessment:     Mirna Eason is a 65 y.o. female with a medical diagnosis of Primary osteoarthritis of left knee.  Patient is s/p left TKA. She completed toilet transfer and task at contact guard assistance along with grooming. She would benefit from skilled OT needs s/p left TKA to increase independence with ADLs and ADL transfers.  Performance deficits affecting function: weakness, impaired sensation, impaired self care skills, impaired functional mobilty, gait instability, impaired balance, decreased lower extremity function.      Rehab Prognosis: Good; patient would benefit from acute skilled OT services to address these deficits and reach maximum level of function.       Plan:     Patient to be seen daily to address the above listed problems via self-care/home management, therapeutic activities, therapeutic exercises  · Plan of Care Expires: 07/14/20  · Plan of Care Reviewed with: patient, sibling    Subjective     Chief Complaint: "pain in knee with walking"  Patient/Family Comments/goals: "get back to activities"    Occupational Profile:  Living Environment: lives with son who is in/out (sister is going to stay for awhile), 1 level home with 2 steps, tub/shower combo with chair, raised toilet seat   Previous level of function: independent with ADLs  Roles and Routines: Greet at [employer]   Equipment Used at Home:  raised toilet, walker, rolling, shower chair  Assistance upon Discharge: sister     Pain/Comfort:  · Pain Rating 1: 4/10  · Location - Side 1: Left  · Location - Orientation 1: generalized  · Location 1: knee  · Pain Addressed 1: Reposition, Distraction    Patients cultural, spiritual, [employer]ec[employer]alism conflicts given the current " situation: no    Objective:     Communicated with: RN prior to session.  Patient found up in chair with FCD, cryotherapy, perineural catheter, peripheral IV upon OT entry to room.    General Precautions: Standard, fall   Orthopedic Precautions:LLE weight bearing as tolerated   Braces: N/A     Occupational Performance:    Functional Mobility/Transfers:  · Patient completed Sit <> Stand Transfer with contact guard assistance  with  rolling walker   · Patient completed Toilet Transfer Step Transfer technique with contact guard assistance with  rolling walker   · Patient completed chair transfer with step transfer technique with contact guard assistance with rolling walker   · Functional Mobility: patient ambulated to/from bathroom with use of rolling walker at contact guard assistance     Activities of Daily Living:  · Grooming: contact guard assistance standing at sink to wash hands  · Toileting: contact guard assistance completed in bathroom with bedside commode placed over toilet    Cognitive/Visual Perceptual:  Cognitive/Psychosocial Skills:     -       Oriented to: Person, Place and Time   -       Follows Commands/attention:Follows multistep  commands    Physical Exam:  Upper Extremity Range of Motion:     -       Right Upper Extremity: WFL  -       Left Upper Extremity: WFL  Upper Extremity Strength:    -       Right Upper Extremity: WFL  -       Left Upper Extremity: WFL    AMPAC 6 Click ADL:  AMPAC Total Score: 19    Treatment & Education:  -Patient educated on hand placement for transfers   -Patient educated on rolling walker placement for ADLs  -Patient educated on role of OT and plan of care   Education:    Patient left up in chair with all lines intact, call button in reach, RN notified and sister present    GOALS:   Multidisciplinary Problems     Occupational Therapy Goals        Problem: Occupational Therapy Goal    Goal Priority Disciplines Outcome Interventions   Occupational Therapy Goal     OT, PT/OT  Ongoing, Progressing    Description: Goals to be met by: 7-14-20    Patient will increase functional independence with ADLs by performing:    UE Dressing with [place].  LE Dressing with Modified [place].  Grooming while standing at sink with Modified [place].  Toileting from toilet with Modified [place] for hygiene and clothing management.   Toilet transfer to toilet with Modified [place].                     History:     Past Medical History:   Diagnosis Date    Arthritis     GERD (gastroesophageal reflux disease)     Meniere disease, left        Past Surgical History:   Procedure Laterality Date    appendectomy  1980's    had ovary, tube removed for tubal pregnancy     CHOLECYSTECTOMY      fracture surgery- fell down thestairs- Ankle - 80's      mechanical fall     JOINT REPLACEMENT      LAPAROSCOPIC CHOLECYSTECTOMY  2009/2010    oophorectomy, [facility]- Rt - 1980's      TOTAL KNEE ARTHROPLASTY Right 10/22/2019    Procedure: ARTHROPLASTY, KNEE, TOTAL-[facility];  Surgeon: Srikanth Jones MD;  Location: [facility];  Service: Orthopedics;  Laterality: Right;       Time Tracking:     OT Date of Treatment: 07/07/20  OT Start Time: 1600  OT Stop Time: 1621  OT Total Time (min): 21 min    Billable Minutes:Evaluation 10  Self Care/Home Management 11    Alize Amin, KUNAL  7/7/2020

## 2020-07-07 NOTE — TRANSFER OF CARE
"Anesthesia Transfer of Care Note    Patient: Mirna Eason    Procedure(s) Performed: Procedure(s) (LRB):  ARTHROPLASTY, KNEE - [facility] (Left)    Patient location: PACU    Anesthesia Type: general    Transport from OR: Transported from OR on 6-10 L/min O2 by face mask with adequate spontaneous ventilation    Post pain: adequate analgesia    Post assessment: no apparent anesthetic complications    Post vital signs: stable    Level of consciousness: awake and sedated    Nausea/Vomiting: no nausea/vomiting    Complications: none    Transfer of care protocol was followed      Last vitals:   Visit Vitals  /61 (BP Location: Right arm, Patient Position: Lying)   Pulse 73   Temp 36.8 °C (98.3 °F) (Oral)   Resp 20   Ht 5' 6" (1.676 m)   Wt 81.2 kg (179 lb)   SpO2 100%   Breastfeeding No   BMI 28.89 kg/m²     "

## 2020-07-07 NOTE — PROGRESS NOTES
Dr. Mccormick notified patient reporting pain 10/10 to anterior left knee despite po and iv medication. TORB robaxin 1 gm po x 1 now.

## 2020-07-07 NOTE — PROGRESS NOTES
VSS.  Patient tolerating oral liquids without difficulty. No apparent s&s of distress noted at this time, no complaints voiced at this time.   Patient has walker in recovery suite room. Post op medications have been delivered to sister at bedside. Discharge instructions reviewed with patient and sister with good verbal feedback received.  Spinal anesthesia has resolved and patient is currently being treated for pain. Will be ready for transfer to recovery suites when pain is tolerable per pt.

## 2020-07-07 NOTE — ANESTHESIA POSTPROCEDURE EVALUATION
Anesthesia Post Evaluation    Patient: Mirna Eason    Procedure(s) Performed: Procedure(s) (LRB):  ARTHROPLASTY, KNEE - [facility] (Left)    Final Anesthesia Type: CSE    Patient location during evaluation: PACU  Patient participation: Yes- Able to Participate  Level of consciousness: awake and alert and oriented  Post-procedure vital signs: reviewed and stable  Pain management: adequate  Airway patency: patent    PONV status at discharge: No PONV  Anesthetic complications: no      Cardiovascular status: hemodynamically stable  Respiratory status: nasal cannula  Hydration status: euvolemic  Follow-up not needed.          Vitals Value Taken Time   /60 07/07/20 1428   Temp 36.2 °C (97.2 °F) 07/07/20 1428   Pulse 73 07/07/20 1428   Resp 18 07/07/20 1531   SpO2 94 % 07/07/20 1428         Event Time   Out of Recovery 14:20:00         Pain/Franco Score: Pain Rating Prior to Med Admin: 7 (7/7/2020  3:31 PM)  Pain Rating Post Med Admin: 4 (7/7/2020  2:28 PM)  Franco Score: 10 (7/7/2020  2:28 PM)

## 2020-07-08 ENCOUNTER — TELEPHONE (OUTPATIENT)
Dept: ORTHOPEDICS | Facility: CLINIC | Age: 65
End: 2020-07-08

## 2020-07-08 ENCOUNTER — PATIENT OUTREACH (OUTPATIENT)
Dept: ORTHOPEDICS | Facility: CLINIC | Age: 65
End: 2020-07-08

## 2020-07-08 VITALS
OXYGEN SATURATION: 99 % | SYSTOLIC BLOOD PRESSURE: 95 MMHG | RESPIRATION RATE: 16 BRPM | HEART RATE: 67 BPM | HEIGHT: 66 IN | BODY MASS INDEX: 28.77 KG/M2 | DIASTOLIC BLOOD PRESSURE: 53 MMHG | WEIGHT: 179 LBS | TEMPERATURE: 98 F

## 2020-07-08 PROCEDURE — 25000003 PHARM REV CODE 250: Performed by: STUDENT IN AN ORGANIZED HEALTH CARE EDUCATION/TRAINING PROGRAM

## 2020-07-08 PROCEDURE — 97535 SELF CARE MNGMENT TRAINING: CPT

## 2020-07-08 PROCEDURE — 99900035 HC TECH TIME PER 15 MIN (STAT)

## 2020-07-08 PROCEDURE — 25000003 PHARM REV CODE 250: Performed by: PHYSICIAN ASSISTANT

## 2020-07-08 PROCEDURE — 97110 THERAPEUTIC EXERCISES: CPT

## 2020-07-08 PROCEDURE — 94761 N-INVAS EAR/PLS OXIMETRY MLT: CPT

## 2020-07-08 PROCEDURE — 63600175 PHARM REV CODE 636 W HCPCS: Performed by: PHYSICIAN ASSISTANT

## 2020-07-08 PROCEDURE — 63600175 PHARM REV CODE 636 W HCPCS: Performed by: ANESTHESIOLOGY

## 2020-07-08 PROCEDURE — 99232 PR SUBSEQUENT HOSPITAL CARE,LEVL II: ICD-10-PCS | Mod: COE,,, | Performed by: ANESTHESIOLOGY

## 2020-07-08 PROCEDURE — 97530 THERAPEUTIC ACTIVITIES: CPT

## 2020-07-08 PROCEDURE — 99232 SBSQ HOSP IP/OBS MODERATE 35: CPT | Mod: COE,,, | Performed by: ANESTHESIOLOGY

## 2020-07-08 PROCEDURE — 97116 GAIT TRAINING THERAPY: CPT

## 2020-07-08 RX ORDER — CELECOXIB 200 MG/1
200 CAPSULE ORAL DAILY
Status: DISCONTINUED | OUTPATIENT
Start: 2020-07-08 | End: 2020-07-08 | Stop reason: HOSPADM

## 2020-07-08 RX ADMIN — POLYETHYLENE GLYCOL (3350) 17 G: 17 POWDER, FOR SOLUTION ORAL at 08:07

## 2020-07-08 RX ADMIN — ROPIVACAINE HYDROCHLORIDE 6 ML/HR: 2 INJECTION, SOLUTION EPIDURAL; INFILTRATION at 02:07

## 2020-07-08 RX ADMIN — OXYCODONE HYDROCHLORIDE 10 MG: 10 TABLET ORAL at 10:07

## 2020-07-08 RX ADMIN — ACETAMINOPHEN 1000 MG: 500 TABLET ORAL at 12:07

## 2020-07-08 RX ADMIN — APIXABAN 2.5 MG: 2.5 TABLET, FILM COATED ORAL at 08:07

## 2020-07-08 RX ADMIN — ROPIVACAINE HYDROCHLORIDE: 2 INJECTION, SOLUTION EPIDURAL; INFILTRATION at 08:07

## 2020-07-08 RX ADMIN — MUPIROCIN 1 G: 20 OINTMENT TOPICAL at 08:07

## 2020-07-08 RX ADMIN — ACETAMINOPHEN 1000 MG: 500 TABLET ORAL at 06:07

## 2020-07-08 RX ADMIN — CELECOXIB 200 MG: 200 CAPSULE ORAL at 10:07

## 2020-07-08 RX ADMIN — FAMOTIDINE 20 MG: 20 TABLET, FILM COATED ORAL at 08:07

## 2020-07-08 RX ADMIN — CEFAZOLIN 2 G: 330 INJECTION, POWDER, FOR SOLUTION INTRAMUSCULAR; INTRAVENOUS at 12:07

## 2020-07-08 RX ADMIN — DOCUSATE SODIUM 50MG AND SENNOSIDES 8.6MG 1 TABLET: 8.6; 5 TABLET, FILM COATED ORAL at 08:07

## 2020-07-08 NOTE — NURSING
Pt transported via wheelchair by hospital staff and picked up by . Pt with no s/s of distress and no complaints of pain.

## 2020-07-08 NOTE — PLAN OF CARE
Patient tolerated PT session fairly well. She ambulated 100ft with rolling walker and supervision. No LOB or SOB noted. She was limited in ambulation distance due to left knee pain. She ascended/descended 2 steps with bilateral handrails and contact guard assistance. She has an OPPT appointment on 2020. She is ready to discharge back to the [facility].     Problem: Physical Therapy Goal  Goal: Physical Therapy Goal  Description: Goals to be met by: 2020    Patient will increase functional independence with mobility by performin. Supine to sit with supervision  2. Sit to stand transfer with Supervision  3. Gait x200 feet with Supervision using Rolling Walker  4. Ascend/Descend 6 inch curb step with Stand-by Assistance using Rolling Walker  5. Lower extremity exercise program x30 reps per handout, with supervision    Outcome: Met

## 2020-07-08 NOTE — SUBJECTIVE & OBJECTIVE
"Principal Problem:Primary osteoarthritis of left knee    Principal Orthopedic Problem: same    Interval History: Patient seen and examined at bedside.  No acute events overnight.  Pain controlled.  Walked 60 feet with PT yesterday.      Review of patient's allergies indicates:  No Known Allergies    Current Facility-Administered Medications   Medication    0.9%  NaCl infusion    acetaminophen tablet 1,000 mg    apixaban tablet 2.5 mg    bisacodyL suppository 10 mg    calcium carbonate 200 mg calcium (500 mg) chewable tablet 500 mg    famotidine tablet 20 mg    morphine injection 2 mg    mupirocin 2 % ointment 1 g    naloxone 0.4 mg/mL injection 0.02 mg    ondansetron injection 4 mg    oxyCODONE immediate release tablet 10 mg    oxyCODONE immediate release tablet 5 mg    polyethylene glycol packet 17 g    pregabalin capsule 150 mg    promethazine (PHENERGAN) 6.25 mg in dextrose 5 % 50 mL IVPB    promethazine (PHENERGAN) 6.25 mg in dextrose 5 % 50 mL IVPB    ropivacaine (PF) 2 mg/ml (0.2%) infusion    ropivacaine (PF) 2 mg/ml (0.2%) infusion    ropivacaine 0.2% ON-Q C-BLOC 400 ML (SELECT A FLOW)    senna-docusate 8.6-50 mg per tablet 1 tablet    [START ON 7/9/2020] triamterene-hydrochlorothiazide 37.5-25 mg per capsule 1 capsule     Objective:     Vital Signs (Most Recent):  Temp: 97.6 °F (36.4 °C) (07/08/20 0417)  Pulse: 74 (07/08/20 0542)  Resp: 16 (07/08/20 0542)  BP: (!) 108/55 (07/08/20 0542)  SpO2: 99 % (07/08/20 0417) Vital Signs (24h Range):  Temp:  [97 °F (36.1 °C)-98.3 °F (36.8 °C)] 97.6 °F (36.4 °C)  Pulse:  [68-83] 74  Resp:  [12-26] 16  SpO2:  [94 %-100 %] 99 %  BP: ()/(46-73) 108/55     Weight: 81.2 kg (179 lb)  Height: 5' 6" (167.6 cm)  Body mass index is 28.89 kg/m².      Intake/Output Summary (Last 24 hours) at 7/8/2020 0617  Last data filed at 7/7/2020 1207  Gross per 24 hour   Intake 2654.2 ml   Output --   Net 2654.2 ml       Ortho/SPM Exam  NAD  No increased " WOB  Dressing c/d/i  SILT T/SP/DP  Motor intact T/DP  SLR intact  WWP extremities    Significant Labs: All pertinent labs within the past 24 hours have been reviewed.    Significant Imaging: I have reviewed all pertinent imaging results/findings.

## 2020-07-08 NOTE — PLAN OF CARE
[facility] patient - will stay in the [facility].       07/08/20 1456   Discharge Assessment   Assessment Type Discharge Planning Assessment   Confirmed/corrected address and phone number on facesheet? Yes   Assessment information obtained from? Patient   Expected Length of Stay (days) 1   Communicated expected length of stay with patient/caregiver yes   Prior to hospitilization cognitive status: Alert/Oriented   Prior to hospitalization functional status: Assistive Equipment   Current cognitive status: Alert/Oriented   Current Functional Status: Assistive Equipment;Needs Assistance   Lives With alone   Is patient able to care for self after discharge? Unable to determine at this time (comments)   Who are your caregiver(s) and their phone number(s)? sister Shaan Caraballo [phone]   Patient's perception of discharge disposition home or selfcare   Readmission Within the Last 30 Days no previous admission in last 30 days   Patient currently being followed by outpatient case management? No   Patient currently receives any other outside agency services? No   Equipment Currently Used at Home bedside commode;walker, rolling   Do you have any problems affording any of your prescribed medications? No   Is the patient taking medications as prescribed? yes   Does the patient have transportation home? Yes  (pt's sister is available for assistance; pt will use [employer] to get back to [facility])   Transportation Anticipated car, drives self;family or friend will provide   Discharge Plan A Home   Discharge Plan B Home;Home Health   DME Needed Upon Discharge  other (see comments)  (TBD)   Patient/Family in Agreement with Plan yes

## 2020-07-08 NOTE — ADDENDUM NOTE
Addendum  created 07/08/20 1125 by Paula Pickering MD    Charge Capture section accepted, Clinical Note Signed

## 2020-07-08 NOTE — PROGRESS NOTES
[facility]  Orthopedics  Progress Note    Patient Name: Mirna Eason  MRN: [id number]  Admission Date: 7/7/2020  Hospital Length of Stay: 1 days  Attending Provider: Srikanth Jones MD  Primary Care Provider: Primary Doctor No  Follow-up For: Procedure(s) (LRB):  ARTHROPLASTY, KNEE - [facility] (Left)    Post-Operative Day: 1 Day Post-Op  Subjective:     Principal Problem:Primary osteoarthritis of left knee    Principal Orthopedic Problem: same    Interval History: Patient seen and examined at bedside.  No acute events overnight.  Pain controlled.  Walked 60 feet with PT yesterday.      Review of patient's allergies indicates:  No Known Allergies    Current Facility-Administered Medications   Medication    0.9%  NaCl infusion    acetaminophen tablet 1,000 mg    apixaban tablet 2.5 mg    bisacodyL suppository 10 mg    calcium carbonate 200 mg calcium (500 mg) chewable tablet 500 mg    famotidine tablet 20 mg    morphine injection 2 mg    mupirocin 2 % ointment 1 g    naloxone 0.4 mg/mL injection 0.02 mg    ondansetron injection 4 mg    oxyCODONE immediate release tablet 10 mg    oxyCODONE immediate release tablet 5 mg    polyethylene glycol packet 17 g    pregabalin capsule 150 mg    promethazine (PHENERGAN) 6.25 mg in dextrose 5 % 50 mL IVPB    promethazine (PHENERGAN) 6.25 mg in dextrose 5 % 50 mL IVPB    ropivacaine (PF) 2 mg/ml (0.2%) infusion    ropivacaine (PF) 2 mg/ml (0.2%) infusion    ropivacaine 0.2% ON-Q C-BLOC 400 ML (SELECT A FLOW)    senna-docusate 8.6-50 mg per tablet 1 tablet    [START ON 7/9/2020] triamterene-hydrochlorothiazide 37.5-25 mg per capsule 1 capsule     Objective:     Vital Signs (Most Recent):  Temp: 97.6 °F (36.4 °C) (07/08/20 0417)  Pulse: 74 (07/08/20 0542)  Resp: 16 (07/08/20 0542)  BP: (!) 108/55 (07/08/20 0542)  SpO2: 99 % (07/08/20 0417) Vital Signs (24h Range):  Temp:  [97 °F (36.1 °C)-98.3 °F (36.8 °C)] 97.6 °F (36.4 °C)  Pulse:   "[68-83] 74  Resp:  [12-26] 16  SpO2:  [94 %-100 %] 99 %  BP: ()/(46-73) 108/55     Weight: 81.2 kg (179 lb)  Height: 5' 6" (167.6 cm)  Body mass index is 28.89 kg/m².      Intake/Output Summary (Last 24 hours) at 7/8/2020 0617  Last data filed at 7/7/2020 1207  Gross per 24 hour   Intake 2654.2 ml   Output --   Net 2654.2 ml       Ortho/SPM Exam  NAD  No increased WOB  Dressing c/d/i  SILT T/SP/DP  Motor intact T/DP  SLR intact  WWP extremities    Significant Labs: All pertinent labs within the past 24 hours have been reviewed.    Significant Imaging: I have reviewed all pertinent imaging results/findings.    Assessment/Plan:     * Primary osteoarthritis of left knee s/p TKA 7/7/20  65 y.o. female s/p L TKA 7/7/20    VS:  Stable   Nerve block:  Adductor PNC; periop CSE, IPACK  PT/OT:  WBAT  DVT PPx:  Eliquis 2.5 BID, FCDs  Cultures:  none  Abx:  periop Ancef  Labs:  none  Drain:  none  [place]:  none    Dispo: likely home today; OP PT set up 7/9/20      History of thrombosis  Eliquis 2.5 BID    Over weight  Stable     History of TIA (transient ischemic attack)  Stable     Acid reflux  Home meds    Anemia  Stable           Germain Riley MD  Orthopedics  [facility]  "

## 2020-07-08 NOTE — PT/OT/SLP PROGRESS
Physical Therapy Treatment and Discharge    Patient Name:  Mirna Eason   MRN:  [id number]    Recommendations:     Discharge Recommendations:  outpatient PT(7/9/2020)   Discharge Equipment Recommendations: none   Barriers to discharge: None    Assessment:     Mirna Eason is a 65 y.o. female admitted with a medical diagnosis of Primary osteoarthritis of left knee.  She presents with the following impairments/functional limitations:  weakness, impaired functional mobilty, gait instability, impaired balance, decreased lower extremity function, pain, decreased ROM, impaired skin, orthopedic precautions. Patient tolerated PT session fairly well. She ambulated 100ft with rolling walker and supervision. No LOB or SOB noted. She was limited in ambulation distance due to left knee pain. She ascended/descended 2 steps with bilateral handrails and contact guard assistance. She has an OPPT appointment on 7/9/2020. She is ready to discharge back to the [facility].     Rehab Prognosis: Good; patient would benefit from acute skilled PT services to address these deficits and reach maximum level of function.    Recent Surgery: Procedure(s) (LRB):  ARTHROPLASTY, KNEE - [facility] [facility] (Left) 1 Day Post-Op    Plan:     During this hospitalization, patient to be seen daily to address the identified rehab impairments via gait training, therapeutic activities, therapeutic exercises and progress toward the following goals:    · Plan of Care Expires:  07/09/20    Subjective     Chief Complaint: Pain in the back of her left knee.   Patient/Family Comments/goals: To walk without pain.   Pain/Comfort:  · Pain Rating 1: 8/10  · Location - Side 1: Left  · Location - Orientation 1: posterior  · Location 1: knee  · Pain Addressed 1: Pre-medicate for activity, Distraction, Cessation of Activity, Nurse notified      Objective:     Communicated with RN prior to session.  Patient found up in chair with cryotherapy, FCD(OnQ) upon PT entry to room.      General Precautions: Standard, fall   Orthopedic Precautions:LLE weight bearing as tolerated   Braces: N/A     Functional Mobility:  · Bed Mobility:     · Sit to Supine: stand by assistance  · Mat Mobility:     · Supine to Sit: stand by assistance  · Sit to Supine: stand by assistance   · Transfers:     · Sit to Stand:  supervision with rolling walker x1 from bedside chair, x1 from wheelchair, and x1 from mat   · Wheelchair to bedside chair: 6ft with supervision and rolling walker   · Gait: Patient ambulated 100ft with Rolling Walker and supervision using 3-point gait. Patient demonstrated decreased ari and decreased step length during gait due to pain, decreased ROM and decreased strength. Verbal cues provided for gait sequence and RW management.   · Stairs:  Pt ascended/descended 2 steps with left handrail and Contact Guard Assistance. Visual demonstration and verbal cues provided for technique.       AM-PAC 6 CLICK MOBILITY  Turning over in bed (including adjusting bedclothes, sheets and blankets)?: 4  Sitting down on and standing up from a chair with arms (e.g., wheelchair, bedside commode, etc.): 4  Moving from lying on back to sitting on the side of the bed?: 4  Moving to and from a bed to a chair (including a wheelchair)?: 4  Need to walk in hospital room?: 4  Climbing 3-5 steps with a railing?: 3  Basic Mobility Total Score: 23       Therapeutic Activities and Exercises:  Patient educated in and performed L LE exercises 2x10 for ankle pumps, quad set, glute set, SAQ over bolster, heel slides, hip abd/add, SLR (x10), and LAQ.    Patient educated in:  -PT role and POC  -safety with transfers including hand placement  -gait sequencing and RW management  -OOB activity to maximize recovery to prevent a DVT   -car transfer  -stair training  -HEP for therex at home with handout provided       Patient left supine with all lines intact, call button in reach and RN and sister present.    GOALS:    Multidisciplinary Problems     Physical Therapy Goals     Not on file          Multidisciplinary Problems (Resolved)        Problem: Physical Therapy Goal    Goal Priority Disciplines Outcome Goal Variances Interventions   Physical Therapy Goal   (Resolved)     PT, PT/OT Met     Description: Goals to be met by: 2020    Patient will increase functional independence with mobility by performin. Supine to sit with supervision  2. Sit to stand transfer with Supervision  3. Gait x200 feet with Supervision using Rolling Walker  4. Ascend/Descend 6 inch curb step with Stand-by Assistance using Rolling Walker  5. Lower extremity exercise program x30 reps per handout, with supervision                     Time Tracking:     PT Received On: 20  PT Start Time: 941     PT Stop Time: 1019  PT Total Time (min): 38 min     Billable Minutes: Gait Training 15 Therapeutic Activity 8 and Therapeutic Exercise 15     Treatment Type: Treatment  PT/PTA: PT       Carlee Dill, PT  2020

## 2020-07-08 NOTE — PLAN OF CARE
Plan of care reviewed and discussed with pt. Pt verbalizes understanding. Pt AA&O x 4, VSS, and no s/s of distress. Dressing to left knee c/d/I. PIV x 1 discontinued with catheter intact. Pt with On Q ball infusing at 6ml/hr. On Q ball education completed with pt and pt's sister. Discharge instructions given to and reviewed with pt and pt's sister. All questions and concerns addressed. All goals met this admission.

## 2020-07-08 NOTE — NURSING
OnQ teaching done with patient and sister (Rashmi). All questions answered. OnQ contract signed and home care instruction pamphlet provided.  Notified pt and sister (Rashmi) to expect follow up phone calls on 7/9/2020 and 7/10/2020.

## 2020-07-08 NOTE — PLAN OF CARE
Problem: Occupational Therapy Goal  Goal: Occupational Therapy Goal  Description: Goals to be met by: 7-14-20    Patient will increase functional independence with ADLs by performing:    UE Dressing with [place].  LE Dressing with Modified [place].  Grooming while standing at sink with Modified [place].  Toileting from toilet with Modified [place] for hygiene and clothing management.   Toilet transfer to toilet with Modified [place].    Outcome: Met    OT goals met. Patient MOD I with ADLs. Sister is staying with her at [facility] to assist if needed.    Alize Amin, OT  7/8/2020

## 2020-07-08 NOTE — PLAN OF CARE
Pt discharged to the [facility] and will go home in a week. PT appt thru [facility].    Future Appointments   Date Time Provider Department Center   7/9/2020  1:15 PM William Lozano, PT [facility]   7/10/2020  1:00 PM Nathalie Lopez, PT [facility]   7/13/2020  9:00 AM Guillermina Roach PA-C [facility]   7/13/2020 10:30 AM Andrea Burris, PT [facility]        07/08/20 1502   Final Note   Assessment Type Final Discharge Note   Anticipated Discharge Disposition Home   Hospital Follow Up  Appt(s) scheduled? Yes   Right Care Referral Info   Post Acute Recommendation No Care   Post-Acute Status   Post-Acute Authorization Other   Other Status No Post-Acute Service Needs   Discharge Delays None known at this time

## 2020-07-08 NOTE — DISCHARGE SUMMARY
"[facility]  Orthopedics  Discharge Summary      Patient Name: Mirna Eason  MRN: [id number]  Admission Date: 7/7/2020  Hospital Length of Stay: 1 days  Discharge Date and Time: 7/8/2020 11:19 AM  Attending Physician: Srikanth Jones MD  Discharging Provider: Germain Riley MD  Primary Care Provider: Primary Doctor No    HPI: Presented for L TKA.    Procedure(s) (LRB):  ARTHROPLASTY, KNEE - [facility] [facility] (Left)      Hospital Course: Uncomplicated.  DC home POD1 after cleared by PT.  Eliquis 2.5 BID for DVT PPx.  WBAT.  F/u 2 weeks for wound check.      Consults (From admission, onward)        Status Ordering Provider     Inpatient consult to Pain Management  Once     Provider:  (Not yet assigned)    Acknowledged GUILLERMINA ROACH     Inpatient consult to Respiratory Care  Once     Provider:  (Not yet assigned)    Acknowledged GUILLERMINA ROACH     Inpatient consult to Social Work  Once     Provider:  (Not yet assigned)    Acknowledged GUILLERMINA ROACH          Significant Diagnostic Studies: No pertinent studies.    Pending Diagnostic Studies:     None        Final Active Diagnoses:    Diagnosis Date Noted POA    PRINCIPAL PROBLEM:  Primary osteoarthritis of left knee s/p TKA 7/7/20 [M17.12] 07/07/2020 Yes    Over weight [E66.3] 07/06/2020 Yes    History of thrombosis [Z86.718] 07/06/2020 Not Applicable    History of TIA (transient ischemic attack) [Z86.73] 02/18/2020 Not Applicable    Acid reflux [K21.9] 10/21/2019 Yes    Anemia [D64.9] 10/08/2019 Yes      Problems Resolved During this Admission:      Discharged Condition: stable    Disposition: Home or Self Care    Follow Up:  Follow-up Information     Guillermina Roach PA-C On 7/13/2020.    Specialty: Orthopedic Surgery  Contact information:  [address] [place] [id number]  [phone]                 Patient Instructions:      BATH/SHOWER CHAIR FOR HOME USE     Order Specific Question Answer Comments   Height: 5' 6" (1.676 " "m)    Weight: 81.2 kg (179 lb)    Length of need (1-99 months): 99    Type: Without back      COMMODE FOR HOME USE     Order Specific Question Answer Comments   Type: Standard    Height: 5' 6" (1.676 m)    Weight: 81.2 kg (179 lb)    Length of need (1-99 months): 1      WALKER FOR HOME USE     Order Specific Question Answer Comments   Type of Walker: Adult (5'4"-6'6")    With wheels? Yes    Height: 5' 6" (1.676 m)    Weight: 81.2 kg (179 lb)    Length of need (1-99 months): 1    Please check all that apply: Walker will be used for gait training.      TRANSFER TUB BENCH FOR HOME USE     Order Specific Question Answer Comments   Type of Transfer Tub Bench: Unpadded    Height: 5' 6" (1.676 m)    Weight: 81.2 kg (179 lb)    Length of need (1-99 months): 1      Activity as tolerated     Call MD for:  difficulty breathing, headache or visual disturbances     Call MD for:  extreme fatigue     Call MD for:  hives     Call MD for:  persistent dizziness or light-headedness     Call MD for:  persistent nausea and vomiting     Call MD for:  redness, tenderness, or signs of infection (pain, swelling, redness, odor or green/yellow discharge around incision site)     Call MD for:  severe uncontrolled pain     Call MD for:  temperature >100.4     Keep surgical extremity elevated     Leave dressing on - Keep it clean, dry, and intact until clinic visit   Order Comments: Do not remove surgical dressing for 2 weeks post-op. This will be done only by MD at initial post-op visit. If dressing is completely saturated, replace with identical dressing - silver-impregnated hydrocolloid dressing.     Do not get dressings wet. Do not shower.     If dressing continues to be saturated or there are signs of infection, please call [facility] [phone] for further instructions and to make appt to be seen.     Lifting restrictions   Order Comments: No strenuous exercise or lifting of > 10 lbs     No driving, operating heavy equipment or signing " legal documents while taking pain medication     Sponge bath only until clinic visit     Weight bearing as tolerated     Medications:  Reconciled Home Medications:      Medication List      CHANGE how you take these medications    celecoxib 200 MG capsule  Commonly known as: CeleBREX  Take 1 capsule (200 mg total) by mouth once daily.  What changed: additional instructions     ELIQUIS 2.5 mg Tab  Generic drug: apixaban  Take 1 tablet (2.5 mg total) by mouth 2 (two) times daily.  What changed: additional instructions     MAPAP ARTHRITIS PAIN 650 MG Tbsr  Generic drug: acetaminophen  Take 1 tablet (650 mg total) by mouth every 8 (eight) hours as needed.  What changed:   · additional instructions  · Another medication with the same name was removed. Continue taking this medication, and follow the directions you see here.     oxyCODONE 5 MG immediate release tablet  Commonly known as: ROXICODONE  Take 1-2 tabs by mouth every 4-6 hours as needed for pain  What changed: additional instructions     STOOL SOFTENER 100 MG capsule  Generic drug: docusate sodium  Take 1 capsule (100 mg total) by mouth 2 (two) times daily as needed for Constipation.  What changed:   · additional instructions  · Another medication with the same name was removed. Continue taking this medication, and follow the directions you see here.        CONTINUE taking these medications    triamterene-hydrochlorothiazide 37.5-25 mg 37.5-25 mg per capsule  Commonly known as: DYAZIDE  Take 1 capsule by mouth once daily. Hold am of surgery     TUMS ORAL  Take by mouth as needed (acid reflux). Hold am of surgery            Germain Riley MD  Orthopedics  [facility]

## 2020-07-08 NOTE — PT/OT/SLP PROGRESS
Occupational Therapy   Treatment/Discharge    Name: Mirna Eason  MRN: [id number]  Admitting Diagnosis:  Primary osteoarthritis of left knee  1 Day Post-Op    Recommendations:     Discharge Recommendations: home  Discharge Equipment Recommendations:  none  Barriers to discharge:       Assessment:     Mirna Eason is a 65 y.o. female with a medical diagnosis of Primary osteoarthritis of left knee. Patient is s/p left TKA. She completed ADLs at MOD I during session today. She is functioning near/at baseline level of function s/p left TKA and has assistance from sister if needed.     Rehab Prognosis:  Good; patient would benefit from acute skilled OT services to address these deficits and reach maximum level of function.       Plan:     · DC from OT    Subjective     Patient reported tightness in back of knee.   Pain/Comfort:  · Pain Rating 1: 0/10    Objective:     Communicated with: RN prior to session.  Patient found supine with cryotherapy, FCD(On-Q Ball) upon OT entry to room.    General Precautions: Standard, fall   Orthopedic Precautions:LLE weight bearing as tolerated   Braces: N/A     Occupational Performance:     Bed Mobility:    · Patient completed Scooting/Bridging with modified independence  · Patient completed Supine to Sit with modified independence     Functional Mobility/Transfers:  · Patient completed Sit <> Stand Transfer with modified independence  with  rolling walker   · Patient completed Toilet Transfer Step Transfer technique with modified independence with  rolling walker   · Patient completed chair transfer with step transfer technique with modified independence with rolling walker   · Functional Mobility: patient ambulated to/from bathroom with use of rolling walker at modified independence     Activities of Daily Living:  · Grooming: modified independence standing at sink to brush teeth and wash face  · Upper Body Dressing: modified independence sitting in chair to don bra and overhead  shirt  · Lower Body Dressing: modified independence sitting in chair to don underwear, shorts and shoes, doffed socks  · Toileting: modified independence completed in bathroom with bedside commode placed over toilet      [facility] 6 Click ADL: 24    Treatment & Education:  -Patient educated to dress surgical side first and further dressing techniques s/p surgery   -Patient educated on hand placement for transfers   -Patient educated on rolling walker placement for ADLs  -Patient educated on [place] use  -Patient educated on role of OT and plan of care     Patient left up in chair with all lines intact, call button in reach, RN notified and sister presentEducation:      GOALS:   Multidisciplinary Problems     Occupational Therapy Goals     Not on file          Multidisciplinary Problems (Resolved)        Problem: Occupational Therapy Goal    Goal Priority Disciplines Outcome Interventions   Occupational Therapy Goal   (Resolved)     OT, PT/OT Met    Description: Goals to be met by: 7-14-20    Patient will increase functional independence with ADLs by performing:    UE Dressing with [place].  LE Dressing with Modified [place].  Grooming while standing at sink with Modified [place].  Toileting from toilet with Modified [place] for hygiene and clothing management.   Toilet transfer to toilet with Modified [place].                     Time Tracking:     OT Date of Treatment: 07/08/20  OT Start Time: 0856  OT Stop Time: 0920  OT Total Time (min): 24 min    Billable Minutes:Self Care/Home Management 24    Alize Amin OT  7/8/2020

## 2020-07-09 ENCOUNTER — TELEPHONE (OUTPATIENT)
Dept: ORTHOPEDICS | Facility: CLINIC | Age: 65
End: 2020-07-09

## 2020-07-09 ENCOUNTER — HOSPITAL ENCOUNTER (EMERGENCY)
Facility: HOSPITAL | Age: 65
Discharge: HOME | End: 2020-07-09
Attending: EMERGENCY MEDICINE
Payer: COMMERCIAL

## 2020-07-09 VITALS
OXYGEN SATURATION: 95 % | HEIGHT: 66 IN | DIASTOLIC BLOOD PRESSURE: 61 MMHG | SYSTOLIC BLOOD PRESSURE: 114 MMHG | WEIGHT: 179 LBS | RESPIRATION RATE: 12 BRPM | HEART RATE: 94 BPM | TEMPERATURE: 99 F | BODY MASS INDEX: 28.77 KG/M2

## 2020-07-09 DIAGNOSIS — R07.9 CHEST PAIN: ICD-10-CM

## 2020-07-09 LAB
ALBUMIN SERPL BCP-MCNC: 2.8 G/DL (ref 3.5–5.2)
ALP SERPL-CCNC: 54 U/L (ref 55–135)
ALT SERPL W/O P-5'-P-CCNC: 7 U/L (ref 10–44)
ANION GAP SERPL CALC-SCNC: 7 MMOL/L (ref 8–16)
AST SERPL-CCNC: 13 U/L (ref 10–40)
BASOPHILS # BLD AUTO: 0.03 K/UL (ref 0–0.2)
BASOPHILS NFR BLD: 0.3 % (ref 0–1.9)
BILIRUB SERPL-MCNC: 0.6 MG/DL (ref 0.1–1)
BUN SERPL-MCNC: 7 MG/DL (ref 8–23)
CALCIUM SERPL-MCNC: 8.5 MG/DL (ref 8.7–10.5)
CHLORIDE SERPL-SCNC: 106 MMOL/L (ref 95–110)
CO2 SERPL-SCNC: 27 MMOL/L (ref 23–29)
CREAT SERPL-MCNC: 0.7 MG/DL (ref 0.5–1.4)
DIFFERENTIAL METHOD: ABNORMAL
EOSINOPHIL # BLD AUTO: 0.1 K/UL (ref 0–0.5)
EOSINOPHIL NFR BLD: 0.7 % (ref 0–8)
ERYTHROCYTE [DISTWIDTH] IN BLOOD BY AUTOMATED COUNT: 13.7 % (ref 11.5–14.5)
EST. GFR  (AFRICAN AMERICAN): >60 ML/MIN/1.73 M^2
EST. GFR  (NON AFRICAN AMERICAN): >60 ML/MIN/1.73 M^2
GLUCOSE SERPL-MCNC: 116 MG/DL (ref 70–110)
HCT VFR BLD AUTO: 30.5 % (ref 37–48.5)
HGB BLD-MCNC: 9.8 G/DL (ref 12–16)
IMM GRANULOCYTES # BLD AUTO: 0.08 K/UL (ref 0–0.04)
IMM GRANULOCYTES NFR BLD AUTO: 0.9 % (ref 0–0.5)
LYMPHOCYTES # BLD AUTO: 0.8 K/UL (ref 1–4.8)
LYMPHOCYTES NFR BLD: 8.8 % (ref 18–48)
MCH RBC QN AUTO: 32.6 PG (ref 27–31)
MCHC RBC AUTO-ENTMCNC: 32.1 G/DL (ref 32–36)
MCV RBC AUTO: 101 FL (ref 82–98)
MONOCYTES # BLD AUTO: 0.9 K/UL (ref 0.3–1)
MONOCYTES NFR BLD: 9.9 % (ref 4–15)
NEUTROPHILS # BLD AUTO: 6.9 K/UL (ref 1.8–7.7)
NEUTROPHILS NFR BLD: 79.4 % (ref 38–73)
NRBC BLD-RTO: 0 /100 WBC
PLATELET # BLD AUTO: 243 K/UL (ref 150–350)
PMV BLD AUTO: 10.7 FL (ref 9.2–12.9)
POTASSIUM SERPL-SCNC: 2.8 MMOL/L (ref 3.5–5.1)
PROT SERPL-MCNC: 6.3 G/DL (ref 6–8.4)
RBC # BLD AUTO: 3.01 M/UL (ref 4–5.4)
SODIUM SERPL-SCNC: 140 MMOL/L (ref 136–145)
TROPONIN I SERPL DL<=0.01 NG/ML-MCNC: <0.006 NG/ML (ref 0–0.03)
TROPONIN I SERPL DL<=0.01 NG/ML-MCNC: <0.006 NG/ML (ref 0–0.03)
WBC # BLD AUTO: 8.71 K/UL (ref 3.9–12.7)

## 2020-07-09 PROCEDURE — 99285 EMERGENCY DEPT VISIT HI MDM: CPT | Mod: COE,,, | Performed by: EMERGENCY MEDICINE

## 2020-07-09 PROCEDURE — 99285 EMERGENCY DEPT VISIT HI MDM: CPT | Mod: 25

## 2020-07-09 PROCEDURE — 84484 ASSAY OF TROPONIN QUANT: CPT | Mod: 91

## 2020-07-09 PROCEDURE — 85025 COMPLETE CBC W/AUTO DIFF WBC: CPT

## 2020-07-09 PROCEDURE — 93005 ELECTROCARDIOGRAM TRACING: CPT

## 2020-07-09 PROCEDURE — 25000003 PHARM REV CODE 250: Performed by: EMERGENCY MEDICINE

## 2020-07-09 PROCEDURE — 93010 ELECTROCARDIOGRAM REPORT: CPT | Mod: COE,,, | Performed by: INTERNAL MEDICINE

## 2020-07-09 PROCEDURE — 25500020 PHARM REV CODE 255: Performed by: EMERGENCY MEDICINE

## 2020-07-09 PROCEDURE — 80053 COMPREHEN METABOLIC PANEL: CPT

## 2020-07-09 PROCEDURE — 99285 PR EMERGENCY DEPT VISIT,LEVEL V: ICD-10-PCS | Mod: COE,,, | Performed by: EMERGENCY MEDICINE

## 2020-07-09 PROCEDURE — 93010 EKG 12-LEAD: ICD-10-PCS | Mod: COE,,, | Performed by: INTERNAL MEDICINE

## 2020-07-09 RX ADMIN — IOHEXOL 100 ML: 350 INJECTION, SOLUTION INTRAVENOUS at 10:07

## 2020-07-09 RX ADMIN — POTASSIUM BICARBONATE 50 MEQ: 978 TABLET, EFFERVESCENT ORAL at 11:07

## 2020-07-09 NOTE — ED TRIAGE NOTES
To the ED from the [facility] where she is staying following total knee replacement on Tuesday.   Currently reporting chest pain.

## 2020-07-09 NOTE — ED NOTES
LOC: The patient is awake, alert, and oriented to place, time, situation. Affect is appropriate.  Speech is appropriate and clear.     APPEARANCE: Patient resting very uncomfortably, reporting chest pain and pain to left leg.  Left knee with dressing intact, opsite with fresh bleeding noted from PCA .    SKIN: Left knee with swelling, dressing intact. tegaderm with bleeding under dressing along PCA. The skin is warm and dry; color consistent with ethnicity.  Patient has normal skin turgor and moist mucus membranes.       MUSCULOSKELETAL: Patient moving left leg with difficulty, assistance required for transfer from chair to bed. Unable to lift leg.  Denies weakness.     RESPIRATORY: Airway is open and patent. Respirations spontaneous, even, easy, and non-labored.  Patient has a normal effort and rate.  No accessory muscle use noted. Denies cough.     CARDIAC:  Normal rhythm and rate noted.  No peripheral edema noted. Complaints of chest pain.      ABDOMEN: Soft and non tender to palpation.  No distention noted.     NEUROLOGIC: Eyes open spontaneously.  Behavior appropriate to situation.  Follows commands; facial expression symmetrical.  Purposeful motor response noted; normal sensation in all extremities.

## 2020-07-09 NOTE — ED PROVIDER NOTES
Encounter Date: 7/9/2020       History     Chief Complaint   Patient presents with    Chest Pain     [facility] + [facility] sided knee sx on Tuesday. + midtsernal chest pains while assited to bathroom approx 30 minutes PTA. + new onset of SOB.      Patient is a 64 y/o female with h/o arthritis who presents with non-radiating substernal CP and some SOB for 1 hour. She had a left TKA 2 days ago and stayed at the [facility] last night. She reports having a mild non-productive cough since her surgery but reports no associated symptoms. Denies dizziness, diaphoresis. She has had issues with her peripheral nerve catheter yesterday, it has caused her some burning pain and has been leaking. Anesthesia has come down to adjust it but she reports that it has become problematic again and has been bleeding a little bit. She has taken oxycodone this AM. Patient is present with her sister who has also provided history. The patient reports she has once been told she had blood clots many years ago when she had her cholecystectomy. History obtained from patient and her sister.        Review of patient's allergies indicates:  No Known Allergies  Past Medical History:   Diagnosis Date    Arthritis     GERD (gastroesophageal reflux disease)     Meniere disease, left      Past Surgical History:   Procedure Laterality Date    appendectomy  1980's    had ovary, tube removed for tubal pregnancy     CHOLECYSTECTOMY      fracture surgery- fell down [facility]- Ankle - 80's      mechanical fall     JOINT REPLACEMENT      KNEE ARTHROPLASTY Left 7/7/2020    Procedure: ARTHROPLASTY, KNEE - [employer] [facility];  Surgeon: Srikanth Jones MD;  Location: [facility] OR;  Service: Orthopedics;  Laterality: Left;    LAPAROSCOPIC CHOLECYSTECTOMY  2009/2010    oophorectomy, Salpix- Rt - 1980's      TOTAL KNEE ARTHROPLASTY Right 10/22/2019    Procedure: ARTHROPLASTY, KNEE, TOTAL-[employer] [facility];  Surgeon: Srikanth Jones MD;  Location: [facility]  OR;  Service: Orthopedics;  Laterality: Right;     Family History   Problem Relation Age of Onset    Heart disease Father 49        heart bypass    Diabetes Mellitus Mother      Social History     Tobacco Use    Smoking status: Former Smoker     Years: 15.00     Quit date:      Years since quittin.5    Smokeless tobacco: Never Used   Substance Use Topics    Alcohol use: Not Currently     Comment: quit at age 28 Y    Drug use: Never     Review of Systems   Constitutional: Negative for chills and fever.   HENT: Negative for rhinorrhea.    Eyes: Negative for pain.   Respiratory: Positive for cough and shortness of breath.         Mild non-productive cough     Cardiovascular: Positive for chest pain and leg swelling.   Gastrointestinal: Negative for abdominal pain, diarrhea, nausea and vomiting.   Endocrine: Negative for polyuria.   Genitourinary: Negative for difficulty urinating.   Musculoskeletal: Negative for back pain.   Skin: Negative for color change.   Neurological: Negative for dizziness, light-headedness and headaches.       Physical Exam     Initial Vitals [20 0843]   BP Pulse Resp Temp SpO2   (!) 113/52 94 20 98.5 °F (36.9 °C) 96 %      MAP       --         Physical Exam    Nursing note and vitals reviewed.  Constitutional: She appears well-developed and well-nourished. She is not diaphoretic. She appears distressed.   HENT:   Head: Normocephalic and atraumatic.   Eyes: Conjunctivae and EOM are normal. Right eye exhibits no discharge. Left eye exhibits no discharge. No scleral icterus.   Neck: Normal range of motion. No JVD present.   Cardiovascular: Normal rate, regular rhythm, normal heart sounds and intact distal pulses. Exam reveals no gallop and no friction rub.    No murmur heard.  Pulmonary/Chest: Breath sounds normal. She is in respiratory distress. She has no wheezes. She has no rhonchi. She has no rales. She exhibits tenderness.   Substernal chest tender to palpation    Abdominal: Soft. She exhibits no distension and no mass. There is no abdominal tenderness. There is no rebound and no guarding.   Musculoskeletal: Tenderness present.      Comments: Left lower leg appears to be swollen appropriately to procedure. Upper left leg is mildly swollen.  Patient feels discomfort in hamstring with dorsiflexion, +Clive sign   Neurological: She is alert and oriented to person, place, and time.   Skin: Skin is warm and dry. No erythema.   Left leg appears swollen but is not erythematous    Psychiatric: She has a normal mood and affect.         ED Course   Procedures  Labs Reviewed - No data to display  EKG Readings: (Independently Interpreted)   Rhythm: Normal Sinus Rhythm.       Imaging Results    None       X-Rays:   Independently Interpreted Readings:   Abdomen: No evidence of a PE on CT     Medical Decision Making:   History:   Old Medical Records: I decided to obtain old medical records.  Old Records Summarized: records from clinic visits.  Initial Assessment:   65 y.o. female  with h/o arthritis presents with non-radiating substernal CP and some SOB for 1 hour. My DDx includes but is not limited to pulmonary embolism, costochondritis, atelectasis. I have considered ACS but based suspicion is low based on story and physical exam. Will obtain labs, CTA. Clinical presentation consistent with either PE or costochondritis.   Differential Diagnosis:   Pulmonary embolism, costochondritis, atelectasis, ACS  Independently Interpreted Test(s):   I have ordered and independently interpreted EKG Reading(s) - see prior notes  Clinical Tests:   Lab Tests: Ordered  Radiological Study: Ordered  Medical Tests: Ordered and Reviewed  ED Management:  Calculated wells score of 9.0. Patient also reports symptoms started after attempting to walk with walker. CP could be attributed to musculoskeletal origin but must rule out PE  11:48 AM No PE evidenced on CT. 2 small nodules discovered  11:50 AM Anesthesia  came down to see catheter. They assessed the need and decided the pt does no longer needs the catheter and removed it.  11:59 AM Discussed with orthopedics given recent  1:00 Explained results to patient including incidental finding of pulmonary nodules. Patient and sister understand to follow up with PCP.                                 Clinical Impression:       ICD-10-CM ICD-9-CM   1. Chest pain  R07.9 786.50                                Diony Preciado MD  Resident  07/09/20 1490

## 2020-07-09 NOTE — PROGRESS NOTES
"  Physical Therapy Daily Treatment Note     Name: Mirna Eason  Clinic Number: [id number]    Therapy Diagnosis: No diagnosis found.  Physician: Srikanth Jones MD    Visit Date: 7/10/2020    Physician Orders: ***  Medical Diagnosis: ***  Evaluation Date: ***  Authorization Period Expiration: ***  Plan of Care Certification Period: ***  Visit #/Visits authorized: ***/ ***     Time In: ***  Time Out: ***  Total Billable Time: *** minutes    Precautions: {IP WOUND PRECAUTIONS OHS:58193}    Subjective     Pt reports: ***.  She was compliant with home exercise program.  Response to previous treatment: ***  Functional change: ***    Pain: {0-10:57979::"0"}/10  Location: {RIGHT/LEFT/BILATERAL:11907} {LOCATION ON BODY:72137}     Objective     Mirna received therapeutic exercises to develop {AMB PT PROGRESS OBJECTIVE:41001} for *** minutes including:    Ankle pumps   Quad sets with towel   TKE stretch   SAQ over foam roll  Glute sets 10 x3  Hip Adduction with ball with knees straight  Slide board abd   Heel prop      Seated knee flexion hangs           Mirna received the following manual therapy techniques: {AMB PT PROGRESS MANUAL THERAPY:56705} were applied to the: *** for *** minutes, including:            Mirna received cold pack for *** minutes to ***.      Home Exercises Provided and Patient Education Provided     Education provided:   - ***    Written Home Exercises Provided: {Blank single:99991::"yes","Patient instructed to cont prior HEP"}.  Exercises were reviewed and Mirna was able to demonstrate them prior to the end of the session.  Mirna demonstrated {Desc; good/fair/poor:64389} understanding of the education provided.     See EMR under {Blank single:62312::"Media","Patient Instructions"} for exercises provided {Blank single:30151::"7/10/2020","prior visit"}.    Assessment     ***  Mirna is progressing well towards her goals.   Pt prognosis is Good.     Pt will continue to benefit from skilled outpatient " physical therapy to address the deficits listed in the problem list box on initial evaluation, provide pt/family education and to maximize pt's level of independence in the home and community environment.     Pt's spiritual, cultural and educational needs considered and pt agreeable to plan of care and goals.     Anticipated barriers to physical therapy: ***    Goals: ***    Plan     ***    Nathalie Lopez, PT

## 2020-07-09 NOTE — TELEPHONE ENCOUNTER
755 am- returned pt's call, spoke to Rashmi, she states she is unable to help pt get up to go to the restroom. She is unable to move her surgical, states she has no pain in her leg but can't move it. Rashmi states they had a bad night. The resident on call and the anesthesia resident came to visit. She states they adjusted the OnQ pain ball as there was blood leaking. She called the anesthesia resident again due to burning pain near the insertion site of On Q ball. Anesthesia instructed them to clamp the ball. Informed Rashmi Mouna and I will come to the [facility] now.   8:05 am In pt's room, pt lying supine in bed, Rashmi at bedside. Pt states she has pain behind her leg and is unable to get up out of bed. Helped pt sit up slowly, then stand slowly and ambulate with walker slowly to the bathroom. Pt used the restroom, helped the pt up and walk a few steps. Pt states she is having chest pain, notified Guillermina NEGRON and escorted pt via wheelchair to the ER. Arrived at ER at 8:40 am. Report given. Rashmi will stay with the pt. Informed her to call for any questions or concerns. Guillermina NEGRON notified Dr. Jones.

## 2020-07-09 NOTE — CARE UPDATE
Mrs. Eason 66 yo F s/p L TKA with Dr. Jones on 7/7/20 called the orthopedic service complaining of 10/10 pain in her L knee. She was discharged on morning of 7/8. She complained of issues with her pain pump. She has been staying in the [facility]. Patient was examined at the bedside in the [facility]. She was compliant with ice and elevation. There was evidence for potential pain pump malfunction with drainage tracking underneath the tegederm. Patient reported turning on the pain pump caused lightning pain down her leg. The incision was clear of any drainage with no signs of erythema. Anesthesia was called to evaluate and re-position the pain pump. Patient was reinforced on oral pain regimen and told to call if any ongoing issues.

## 2020-07-09 NOTE — DISCHARGE INSTRUCTIONS
Incidental findings to follow-up with your primary care doctor:  Solid nodules present in both lungs, the largest measuring up to 0.4 cm.  For multiple solid nodules all <6 mm, Fleischner Society 2017 guidelines recommend no routine follow up for a low risk patient, or follow up with non-contrast chest CT at 12 months after discovery in a high risk patient.

## 2020-07-09 NOTE — TELEPHONE ENCOUNTER
Spoke with Ms. Caraballo, Ms. Bradley has been sleeping since being discharged from the ER.  She plans to wake Ms. Bradley soon to administer pain medication.  Reminded her that the therapy appointment is tomorrow for 1 pm.  Encouraged her to assist Ms. Bradley with the home exercises on today.  Ms. Rashmi verbalized understanding.

## 2020-07-09 NOTE — TELEPHONE ENCOUNTER
Spoke with Ms. Caraballo (sister), she received writers' message regarding the cancellation of the physical therapy appointment today at 1315, she stated that Ms. Bradley was resting quietly in the ER, writer encouraged her to eat lunch, as there was no mention of a discharge as of yet, instructed her to assist Ms. Bradley with home exercises on today.  Ms. Caraballo verbalized understanding.

## 2020-07-09 NOTE — ADDENDUM NOTE
Addendum  created 07/09/20 1224 by Jacinda De La Cruz RN    Intraprocedure Event edited, LDA properties accepted

## 2020-07-09 NOTE — TELEPHONE ENCOUNTER
1549 Visited patient in room, her sister Rashmi at bedside, patient lying supine in bed, surgical site assessed, bandage c/d/i, pain rated 6/10, left pedal pulse 3+, polar ice machine in place, cold, foot warm & dry, last pain medication taken at 1330, On Q pump patent, patient described her experience coming out of recovery, she stated that she was in so much pain and it took 3 hours to get her pain controlled.    Medications assessed, reports taking as prescribed.      Surgery date 7-7-20

## 2020-07-09 NOTE — TELEPHONE ENCOUNTER
5201 visited patient in her room ([facility] [address]), after receiving a phone call from Ms. Caraballo stating that after writer had left the room, the patient got out of bed to use the restroom, she then noticed that there was blood in the On Q catheter.  Writer assessed the catheter, there was blood tinged fluid in the line and leaking out into the bandage.  Writer explained that we could discontinue the line or reinforce it.  Writer called the after hours On Q representative and was told to clamp the line for 2-3 hours and see if there was any change in the pain level, the other option was to reinforce or discontinue the catheter.  The line was clamped off as instructed.  Writer informed that if the pain got any worse, that they should call the Patient  and they would get someone out to the hotel to see her. Patient and Ms. Caraballo verbalized understanding.

## 2020-07-10 ENCOUNTER — TELEPHONE (OUTPATIENT)
Dept: ORTHOPEDICS | Facility: CLINIC | Age: 65
End: 2020-07-10

## 2020-07-10 ENCOUNTER — PATIENT OUTREACH (OUTPATIENT)
Dept: ORTHOPEDICS | Facility: CLINIC | Age: 65
End: 2020-07-10

## 2020-07-10 ENCOUNTER — CLINICAL SUPPORT (OUTPATIENT)
Dept: REHABILITATION | Facility: HOSPITAL | Age: 65
End: 2020-07-10
Payer: COMMERCIAL

## 2020-07-10 DIAGNOSIS — R29.898 DECREASED STRENGTH OF LOWER EXTREMITY: ICD-10-CM

## 2020-07-10 DIAGNOSIS — M25.662 DECREASED RANGE OF MOTION OF LEFT KNEE: ICD-10-CM

## 2020-07-10 DIAGNOSIS — Z74.09 IMPAIRED FUNCTIONAL MOBILITY, BALANCE, GAIT, AND ENDURANCE: ICD-10-CM

## 2020-07-10 PROCEDURE — 97162 PT EVAL MOD COMPLEX 30 MIN: CPT | Mod: PO

## 2020-07-10 NOTE — TELEPHONE ENCOUNTER
0902 - 0931 visited patient in [facility] room, Ms. Caraballo (sister) at bedside, surgery date 7-7-20, pain 0/10, pain medications last taken at 0800 pain was 5/10 at that time, pulse 2+, bandage c/d/i, no s/s of infection around the bandage, denies BM, reports taking Colace as prescribed (1 tablet BID), patient states that at home she takes 3 tablets in the morning and 3 tablets at HS, patient instructed to take 2 tablets in the morning 1 at HS. Reports taking all medications as prescribed otherwise. Polar Ice machine was not attached, stated that she had taken it off around 6 am but wears frequently. On Q pump d/c'd in the ER or yesterday, therapy scheduled today at 1300, encouraged to perform home exercises over the weekend, requested a larger vehicle for transport, [facility] has been notified.

## 2020-07-10 NOTE — TELEPHONE ENCOUNTER
Spoke to pt, pt states she is doing well, she has been up and moving around better. She states she took 2 pain pills at 0800 am and fell asleep. Pt states its a good time to visit. Informed pt we will be there shortly. Pt pleased and verbalized understanding.

## 2020-07-10 NOTE — PLAN OF CARE
[facility]  Physical Therapy Initial Evaluation    Name: Mirna Eason  Clinic Number: [id number]    Therapy Diagnosis:   Encounter Diagnoses   Name Primary?    Decreased range of motion of left knee     Decreased strength of lower extremity     Impaired functional mobility, balance, gait, and endurance      Physician: Srikanth Jones MD    Physician Orders: PT Eval and Treat   Medical Diagnosis from Referral: Primary osteoarthritis of left knee  Evaluation Date: 7/10/2020  Authorization Period Expiration: 6/01/2021  Plan of Care Expiration: 7/17/2020  Visit # / Visits authorized: 1/1    Time In: 12:30 PM  Time Out: 1:14 PM    Total Billable Time: 44 minutes    Precautions: s/p L TKA on 7/7/2020, GERD, menieres     Subjective   Date of onset: s/p L TKA 7/7/2020  History of current condition - Mirna reports: she had her left knee replacement on the 7/7/2020. She started with increased pain after the anesthesia wore off. They gave her pills, injections, etc. Then they gave her morphine and that decreased the pain. On the 8th when the anesthesiologist tech came in to disconnect her IV, when he went to pull the tape from the stomach she thinks he pulled the needle out some. Then she noticed the leg started burning. More and more blood was coming and running down the leg. Yesterday morning when she tried to get out of bed her leg hurt so bad that she could not move. Yesterday morning, she got chest pain and had to go to the ER. They cleared her saying that it is not clots. She is from [place] and works at [employer].     Medical History:   Past Medical History:   Diagnosis Date    Arthritis     GERD (gastroesophageal reflux disease)     Meniere disease, left        Surgical History:   Mirna Eason  has a past surgical history that includes Laparoscopic cholecystectomy (2009/2010); oophorectomy, Salpix- Rt - 1980's; appendectomy (1980's); fracture surgery- fell down thestairs- Ankle -  80's; Total knee arthroplasty (Right, 10/22/2019); Cholecystectomy; Joint replacement; and Knee Arthroplasty (Left, 7/7/2020).    Medications:   Mirna has a current medication list which includes the following prescription(s): acetaminophen, apixaban, calcium carbonate, celecoxib, docusate sodium, oxycodone, and triamterene-hydrochlorothiazide 37.5-25 mg.    Allergies:   Review of patient's allergies indicates:  No Known Allergies     Imaging,   Xray L knee: S/P TKA;  Unilateral primary osteoarthritis, left knee     FINDINGS:  There is a left TKA in place good alignment no complication.    CTA chest 7/9/2020: No definite evidence of pulmonary thromboembolism, right heart strain, or pulmonary infarct identified.  Dilated main pulmonary artery, could correlate with pulmonary arterial hypertension.   Solid nodules present in both lungs, the largest measuring up to 0.4 cm.  For multiple solid nodules all <6 mm, Fleischner Society 2017 guidelines recommend no routine follow up for a low risk patient, or follow up with non-contrast chest CT at 12 months after discovery in a high risk patient.   Additional findings as above.      Prior Therapy: PT for R TKA here at [facility] after [employer] knee   Social History: she lives with her son when he is there; her sister will stay with her for a week when they get home; no stairs in the home but 2 steps to enter   Occupation: [employer] worker  Prior Level of Function: chronic  Current Level of Function: s/p L TKA 7/72020    Pain:  Current 4/10, worst 10/10, best 4/10   Location: left knee   Description: Tight  Aggravating Factors: Walking; standing up; weight bearing   Easing Factors: elevation, getting off of the leg; pain medication    Pts goals: decrease pain; returning to work    Objective         Observation: enters clinic with RW; bandage clean and intact          Active ROM: (measured in degrees)  Knee     Right +2-125   Left +10-55         Lower Extremity Strength (graded 0-5 out  "of 5)     Right LE Left LE   Hip flexion: 5/5 3/5   Hip ER: 4+/5     Hip IR: 4+/5     Knee extension:  4+/5 3/5   Ankle dorsiflexion: 5/5 4-/5      Function:  · Sit <--> Stand: SBA-CGA   · Bed Mobility: Anahi for legs on and off mat         Gait: ambulated with rolling walker with SBA  Patient displays antalgic gait with limp on L LE, decreased L  heel strike and step length, decreased weight bearing on L LE.             TREATMENT   Treatment Time In: 1:00 PM  Treatment Time Out: 1:14 PM  Total Treatment time separate from Evaluation: 14 minutes    Mirna received therapeutic exercises to develop strength and endurance for 14 minutes including:    Ankle pumps 2x10  Quad sets 2x10 5" hold  Glut sets 2x10 5" hold  Supine hip IR/ER with leg extended 3x10      Home Exercises and Patient Education Provided    Education provided:   - POC  -HEP    Written Home Exercises Provided: yes.  Exercises were reviewed and Mirna was able to demonstrate them prior to the end of the session.  Mirna demonstrated good  understanding of the education provided.     See EMR under Patient Instructions for exercises provided 7/10/2020.    Assessment   Mirna is a 65 y.o. female referred to outpatient Physical Therapy with a medical diagnosis of Primary osteoarthritis of left knee. Pt presents s/p L TKA on 7/7/2020. Pt presents with decreased ROM, decreased strength, swelling, abnormal gait, and  functional mobility limitations. Pt will benefit from PT to address these deficits to return to PLOF.    Pt prognosis is Good.   Pt will benefit from skilled outpatient Physical Therapy to address the deficits stated above and in the chart below, provide pt/family education, and to maximize pt's level of independence.     Plan of care discussed with patient: Yes  Pt's spiritual, cultural and educational needs considered and patient is agreeable to the plan of care and goals as stated below:     Anticipated Barriers for therapy: none anticipated "     Medical Necessity is demonstrated by the following  History  Co-morbidities and personal factors that may impact the plan of care Co-morbidities:   arthritis, GERD, meniere's disease, s/p L TKA          Personal Factors:   no deficits     moderate   Examination  Body Structures and Functions, activity limitations and participation restrictions that may impact the plan of care Body Regions:   lower extremities    Body Systems:    gross symmetry  ROM  strength  gross coordinated movement  balance  gait  transfers    Participation Restrictions:   Walking without AD  Limited s/p L  TKA     Activity limitations:   Learning and applying knowledge  no deficits    General Tasks and Commands  no deficits    Communication  no deficits    Mobility  walking  driving (bike, car, motorcycle)    Self care  caring for body parts (brushing teeth, shaving, grooming)    Domestic Life  doing house work (cleaning house, washing dishes, laundry)    Interactions/Relationships  no deficits    Life Areas  no deficits    Community and Social Life  no deficits         moderate   Clinical Presentation evolving clinical presentation with changing clinical characteristics moderate   Decision Making/ Complexity Score: moderate           GOALS:     Short Term GOALS: 1 visits.   1. Patient demonstrates independence with HEP.   2. Patient demonstrates increased L knee AROM +6-61 degrees.  3. Patient demonstrates independence with bed mobility and sit to stand transfer.       Plan   Plan of care Certification: 7/10/2020 to 7/17/2020.    Outpatient Physical Therapy 2 times weekly for 1 weeks to include the following interventions: Gait Training, Manual Therapy, Moist Heat/ Ice, Neuromuscular Re-ed, Orthotic Management and Training, Patient Education, Self Care, Therapeutic Activites and Therapeutic Exercise.       Nathalie Lopez, PT

## 2020-07-13 ENCOUNTER — CLINICAL SUPPORT (OUTPATIENT)
Dept: REHABILITATION | Facility: HOSPITAL | Age: 65
End: 2020-07-13
Payer: COMMERCIAL

## 2020-07-13 ENCOUNTER — OFFICE VISIT (OUTPATIENT)
Dept: ORTHOPEDICS | Facility: CLINIC | Age: 65
End: 2020-07-13
Payer: COMMERCIAL

## 2020-07-13 VITALS
SYSTOLIC BLOOD PRESSURE: 121 MMHG | HEIGHT: 66 IN | BODY MASS INDEX: 28.77 KG/M2 | DIASTOLIC BLOOD PRESSURE: 77 MMHG | TEMPERATURE: 97 F | WEIGHT: 179 LBS | HEART RATE: 91 BPM

## 2020-07-13 DIAGNOSIS — Z96.652 STATUS POST TOTAL LEFT KNEE REPLACEMENT: Primary | ICD-10-CM

## 2020-07-13 DIAGNOSIS — M25.662 DECREASED RANGE OF MOTION OF LEFT KNEE: ICD-10-CM

## 2020-07-13 DIAGNOSIS — Z74.09 IMPAIRED FUNCTIONAL MOBILITY, BALANCE, GAIT, AND ENDURANCE: ICD-10-CM

## 2020-07-13 DIAGNOSIS — R29.898 DECREASED STRENGTH OF LOWER EXTREMITY: ICD-10-CM

## 2020-07-13 PROCEDURE — 99024 POSTOP FOLLOW-UP VISIT: CPT | Mod: S$GLB,COE,, | Performed by: PHYSICIAN ASSISTANT

## 2020-07-13 PROCEDURE — 97110 THERAPEUTIC EXERCISES: CPT | Mod: PO

## 2020-07-13 PROCEDURE — 97140 MANUAL THERAPY 1/> REGIONS: CPT | Mod: PO

## 2020-07-13 PROCEDURE — 99024 PR POST-OP FOLLOW-UP VISIT: ICD-10-PCS | Mod: S$GLB,COE,, | Performed by: PHYSICIAN ASSISTANT

## 2020-07-13 PROCEDURE — 99999 PR PBB SHADOW E&M-EST. PATIENT-LVL III: CPT | Mod: PBBFAC,COE,, | Performed by: PHYSICIAN ASSISTANT

## 2020-07-13 PROCEDURE — 99999 PR PBB SHADOW E&M-EST. PATIENT-LVL III: ICD-10-PCS | Mod: PBBFAC,COE,, | Performed by: PHYSICIAN ASSISTANT

## 2020-07-13 RX ORDER — OXYCODONE HYDROCHLORIDE 5 MG/1
TABLET ORAL
Qty: 50 TABLET | Refills: 0 | Status: SHIPPED | OUTPATIENT
Start: 2020-07-13

## 2020-07-13 NOTE — PROGRESS NOTES
"Mirna Eason presents for initial post-operative visit following a left total knee arthroplasty performed by Dr. Jones on 7/7/2020. Patient is part of [facility] program. She had chest pain on POD1 and was evaluated. No cardiopulmonary cause of pain. She is doing very well now. She is on Eliquis 2.5 mg bid for one month PO b/c of h/o DVT. Patient's pain is well controlled. Driving to [facility] today and then home to [place] in the morning.     Exam:   Blood pressure 121/77, pulse 91, temperature 97.2 °F (36.2 °C), temperature source Oral, height 5' 6" (1.676 m), weight 81.2 kg (179 lb 0.2 oz).   Ambulating well with assistive device.  Incision is clean and dry without drainage or erythema.   ROM: patient can perform SLR easily and flex to 80 degrees     Initial post-operative radiographs reviewed today revealing a well fixed and aligned prosthesis.    A/P:  6 days s/p left total knee replacement  - The patient was advised to keep the incision clean and dry for the next 24 hours after which she may wash the area with antibacterial soap in the shower. Will not submerge until the incision is completely healed.   - Outpatient PT set up in [place]   - Continue Eliquis  for 1 month from surgery. She will get out of the car each hour during the drive home to walk around with walker and do ankle pumps while in vehicle.   - Pain medication refilled today.   - Reviewed antibiotic prophylaxis   - Follow up with PCP at 2 week post op for dressing removal. Pt will call us with questions/concerns.   "

## 2020-07-13 NOTE — PROGRESS NOTES
"    Physical Therapy Daily Treatment Note/DISCHARGE NOTE     Name: Mirna Eason  Clinic Number: [id number]    Therapy Diagnosis:   Encounter Diagnoses   Name Primary?    Decreased range of motion of left knee     Decreased strength of lower extremity     Impaired functional mobility, balance, gait, and endurance      Physician: Srikanth Jones MD    Visit Date: 7/13/2020    Physician Orders: PT Eval and Treat   Medical Diagnosis from Referral: Primary osteoarthritis of left knee  Evaluation Date: 7/10/2020  Authorization Period Expiration: 6/01/2021  Plan of Care Expiration: 7/17/2020  Visit # / Visits authorized: 2/1    Time In: 1030am  Time Out: 1120am  Total Billable Time: 40 minutes (2te 1man)    Precautions: LEROYm Danny, s/p L TKA 7/7    Subjective     Pt reports: she is feeling moderate pain today.  She was compliant with home exercise program.  Response to previous treatment: soreness   Functional change: none     Pain: 5/10  Location: left knee     Objective     Active ROM L knee:   7/10: +10-55  7/13: +2- 73 with overpressure    Mirna received therapeutic exercises to develop strength, endurance, ROM, flexibility and core stabilization for 30 minutes including:    Ankle pumps 2x10  Quad sets 3x10 5" hold with towel under ankle   Glut sets 2x10 5" hold  Supine hip IR/ER with leg extended 3x10  Heel slides 2x4 minutes   Long sitting hip abduction sliding 3x10 Left         Mirna received the following manual therapy techniques: Soft tissue Mobilization were applied to the: L knee for 10 minutes, including:  - STM Left hamstring medial and lateral    Mirna received cold pack for 10 minutes to L knee.      Home Exercises Provided and Patient Education Provided     Education provided:   - reiteration of HEP     Written Home Exercises Provided: Patient instructed to cont prior HEP.  Exercises were reviewed and Mirna was able to demonstrate them prior to the end of the session.  Mirna demonstrated good  " understanding of the education provided.     See EMR under Patient Instructions for exercises provided 7/13/2020.    Assessment     Pt presents to discharge with slight improvements in active and passive range of motion of involved knee. Pt to be going home to [place] this afternoon via car and pt was educated on avoiding driving for long periods of time without getting out and stretchign the knee. Pt expressed understanding. Pt also met all of her short term goals and is appropriate for discharge and return to outpatient physical therapy in her hometown.     Mirna is progressing well towards her goals.   Pt prognosis is Good.     Pt will continue to benefit from skilled outpatient physical therapy to address the deficits listed in the problem list box on initial evaluation, provide pt/family education and to maximize pt's level of independence in the home and community environment.     Pt's spiritual, cultural and educational needs considered and pt agreeable to plan of care and goals.     Anticipated barriers to physical therapy: none anticipated     GOALS:      Short Term GOALS: 1 visits. MET 7/13  1. Patient demonstrates independence with HEP.   2. Patient demonstrates increased L knee AROM +6-61 degrees.  3. Patient demonstrates independence with bed mobility and sit to stand transfer.    Plan     Pt will be discharged home to [place] and continue outpatient PT once back in [place].     Andrea Burris, PT

## 2020-07-15 ENCOUNTER — TELEPHONE (OUTPATIENT)
Dept: ORTHOPEDICS | Facility: CLINIC | Age: 65
End: 2020-07-15

## 2020-07-15 NOTE — TELEPHONE ENCOUNTER
Spoke with Ms. Bradley, she stated that she is doing fine, described her trip, caregiver, Ms. Caraballo's  had a stroke on Sunday night and Ms. Caraballo is at the hospital with her .  Her son is with her today, tomorrow her cousin, on friday another cousin will stay with her, Saturday & Sunday her son will be there, Monday her cousin again.  She does have a cousin that can stay with her every day after today.    Therapy called and changed her appointment to tomorrow at 1000.

## 2020-09-25 ENCOUNTER — PATIENT OUTREACH (OUTPATIENT)
Dept: ORTHOPEDICS | Facility: CLINIC | Age: 65
End: 2020-09-25

## 2021-06-15 ENCOUNTER — PATIENT OUTREACH (OUTPATIENT)
Dept: ORTHOPEDICS | Facility: CLINIC | Age: 66
End: 2021-06-15

## 2021-07-01 ENCOUNTER — PATIENT MESSAGE (OUTPATIENT)
Dept: ADMINISTRATIVE | Facility: OTHER | Age: 66
End: 2021-07-01

## 2025-03-02 NOTE — TELEPHONE ENCOUNTER
L/M via voicemail.  Attempting to confirm the phone call scheduled with Dr. Bustillo on 10-8-19 @ 0800.  Awaiting a return phone call.   Strong peripheral pulses
